# Patient Record
Sex: FEMALE | Race: BLACK OR AFRICAN AMERICAN | Employment: OTHER | URBAN - METROPOLITAN AREA
[De-identification: names, ages, dates, MRNs, and addresses within clinical notes are randomized per-mention and may not be internally consistent; named-entity substitution may affect disease eponyms.]

---

## 2018-10-16 ENCOUNTER — OP HISTORICAL/CONVERTED ENCOUNTER (OUTPATIENT)
Dept: OTHER | Age: 68
End: 2018-10-16

## 2019-08-08 ENCOUNTER — ED HISTORICAL/CONVERTED ENCOUNTER (OUTPATIENT)
Dept: OTHER | Age: 69
End: 2019-08-08

## 2019-11-14 ENCOUNTER — IP HISTORICAL/CONVERTED ENCOUNTER (OUTPATIENT)
Dept: OTHER | Age: 69
End: 2019-11-14

## 2020-01-17 ENCOUNTER — OP HISTORICAL/CONVERTED ENCOUNTER (OUTPATIENT)
Dept: OTHER | Age: 70
End: 2020-01-17

## 2020-04-16 ENCOUNTER — IP HISTORICAL/CONVERTED ENCOUNTER (OUTPATIENT)
Dept: OTHER | Age: 70
End: 2020-04-16

## 2020-05-05 ENCOUNTER — ED HISTORICAL/CONVERTED ENCOUNTER (OUTPATIENT)
Dept: OTHER | Age: 70
End: 2020-05-05

## 2021-02-26 ENCOUNTER — APPOINTMENT (OUTPATIENT)
Dept: CT IMAGING | Age: 71
DRG: 208 | End: 2021-02-26
Attending: EMERGENCY MEDICINE
Payer: MEDICARE

## 2021-02-26 ENCOUNTER — APPOINTMENT (OUTPATIENT)
Dept: GENERAL RADIOLOGY | Age: 71
DRG: 208 | End: 2021-02-26
Attending: EMERGENCY MEDICINE
Payer: MEDICARE

## 2021-02-26 ENCOUNTER — HOSPITAL ENCOUNTER (INPATIENT)
Age: 71
LOS: 10 days | Discharge: HOME HEALTH | DRG: 208 | End: 2021-03-08
Attending: EMERGENCY MEDICINE | Admitting: FAMILY MEDICINE
Payer: MEDICARE

## 2021-02-26 DIAGNOSIS — R09.02 HYPOXIA: Primary | ICD-10-CM

## 2021-02-26 DIAGNOSIS — Z99.2 ESRD ON HEMODIALYSIS (HCC): ICD-10-CM

## 2021-02-26 DIAGNOSIS — N18.6 ESRD ON HEMODIALYSIS (HCC): ICD-10-CM

## 2021-02-26 DIAGNOSIS — M31.0 GOODPASTURE'S DISEASE (HCC): ICD-10-CM

## 2021-02-26 DIAGNOSIS — I44.2 COMPLETE HEART BLOCK (HCC): ICD-10-CM

## 2021-02-26 PROBLEM — R06.02 SOB (SHORTNESS OF BREATH): Status: ACTIVE | Noted: 2021-02-26

## 2021-02-26 PROBLEM — J18.9 PNA (PNEUMONIA): Status: ACTIVE | Noted: 2021-02-26

## 2021-02-26 LAB
ALBUMIN SERPL-MCNC: 3.2 G/DL (ref 3.5–5)
ALBUMIN/GLOB SERPL: 1.1 {RATIO} (ref 1.1–2.2)
ALP SERPL-CCNC: 99 U/L (ref 45–117)
ALT SERPL-CCNC: 15 U/L (ref 12–78)
ANION GAP SERPL CALC-SCNC: 9 MMOL/L (ref 5–15)
AST SERPL W P-5'-P-CCNC: 21 U/L (ref 15–37)
ATRIAL RATE: 77 BPM
BASOPHILS # BLD: 0 K/UL (ref 0–0.1)
BASOPHILS NFR BLD: 0 % (ref 0–1)
BILIRUB SERPL-MCNC: 0.4 MG/DL (ref 0.2–1)
BNP SERPL-MCNC: ABNORMAL PG/ML
BUN SERPL-MCNC: 93 MG/DL (ref 6–20)
BUN/CREAT SERPL: 10 (ref 12–20)
CA-I BLD-MCNC: 7.7 MG/DL (ref 8.5–10.1)
CALCULATED P AXIS, ECG09: 46 DEGREES
CALCULATED R AXIS, ECG10: 32 DEGREES
CALCULATED T AXIS, ECG11: 68 DEGREES
CHLORIDE SERPL-SCNC: 96 MMOL/L (ref 97–108)
CO2 SERPL-SCNC: 28 MMOL/L (ref 21–32)
COVID-19 RAPID TEST, COVR: NOT DETECTED
CREAT SERPL-MCNC: 8.98 MG/DL (ref 0.55–1.02)
DIAGNOSIS, 93000: NORMAL
DIFFERENTIAL METHOD BLD: ABNORMAL
EOSINOPHIL # BLD: 0 K/UL (ref 0–0.4)
EOSINOPHIL NFR BLD: 0 % (ref 0–7)
ERYTHROCYTE [DISTWIDTH] IN BLOOD BY AUTOMATED COUNT: 16.6 % (ref 11.5–14.5)
GLOBULIN SER CALC-MCNC: 2.8 G/DL (ref 2–4)
GLUCOSE SERPL-MCNC: 117 MG/DL (ref 65–100)
HCT VFR BLD AUTO: 28.3 % (ref 35–47)
HGB BLD-MCNC: 9 G/DL (ref 11.5–16)
IMM GRANULOCYTES # BLD AUTO: 0 K/UL (ref 0–0.04)
IMM GRANULOCYTES NFR BLD AUTO: 0 % (ref 0–0.5)
LACTATE SERPL-SCNC: 1.5 MMOL/L (ref 0.4–2)
LYMPHOCYTES # BLD: 0.2 K/UL (ref 0.8–3.5)
LYMPHOCYTES NFR BLD: 9 % (ref 12–49)
MCH RBC QN AUTO: 28.6 PG (ref 26–34)
MCHC RBC AUTO-ENTMCNC: 31.8 G/DL (ref 30–36.5)
MCV RBC AUTO: 89.8 FL (ref 80–99)
MONOCYTES # BLD: 0.1 K/UL (ref 0–1)
MONOCYTES NFR BLD: 2 % (ref 5–13)
NEUTS SEG # BLD: 2.2 K/UL (ref 1.8–8)
NEUTS SEG NFR BLD: 89 % (ref 32–75)
P-R INTERVAL, ECG05: 180 MS
PLATELET # BLD AUTO: 145 K/UL (ref 150–400)
POTASSIUM SERPL-SCNC: 6.4 MMOL/L (ref 3.5–5.1)
PROT SERPL-MCNC: 6 G/DL (ref 6.4–8.2)
Q-T INTERVAL, ECG07: 444 MS
QRS DURATION, ECG06: 76 MS
QTC CALCULATION (BEZET), ECG08: 502 MS
RBC # BLD AUTO: 3.15 M/UL (ref 3.8–5.2)
SARS-COV-2, COV2: NORMAL
SODIUM SERPL-SCNC: 133 MMOL/L (ref 136–145)
SPECIMEN SOURCE: NORMAL
TROPONIN I SERPL-MCNC: <0.05 NG/ML
VENTRICULAR RATE, ECG03: 77 BPM
WBC # BLD AUTO: 2.5 K/UL (ref 3.6–11)

## 2021-02-26 PROCEDURE — 87635 SARS-COV-2 COVID-19 AMP PRB: CPT

## 2021-02-26 PROCEDURE — 93005 ELECTROCARDIOGRAM TRACING: CPT

## 2021-02-26 PROCEDURE — 5A09357 ASSISTANCE WITH RESPIRATORY VENTILATION, LESS THAN 24 CONSECUTIVE HOURS, CONTINUOUS POSITIVE AIRWAY PRESSURE: ICD-10-PCS | Performed by: FAMILY MEDICINE

## 2021-02-26 PROCEDURE — 74011000636 HC RX REV CODE- 636: Performed by: EMERGENCY MEDICINE

## 2021-02-26 PROCEDURE — 74011250636 HC RX REV CODE- 250/636

## 2021-02-26 PROCEDURE — 36415 COLL VENOUS BLD VENIPUNCTURE: CPT

## 2021-02-26 PROCEDURE — 71275 CT ANGIOGRAPHY CHEST: CPT

## 2021-02-26 PROCEDURE — 80053 COMPREHEN METABOLIC PANEL: CPT

## 2021-02-26 PROCEDURE — G0257 UNSCHED DIALYSIS ESRD PT HOS: HCPCS

## 2021-02-26 PROCEDURE — 84484 ASSAY OF TROPONIN QUANT: CPT

## 2021-02-26 PROCEDURE — 74011000250 HC RX REV CODE- 250

## 2021-02-26 PROCEDURE — 74011250636 HC RX REV CODE- 250/636: Performed by: EMERGENCY MEDICINE

## 2021-02-26 PROCEDURE — 99285 EMERGENCY DEPT VISIT HI MDM: CPT

## 2021-02-26 PROCEDURE — 83605 ASSAY OF LACTIC ACID: CPT

## 2021-02-26 PROCEDURE — 74011000258 HC RX REV CODE- 258: Performed by: FAMILY MEDICINE

## 2021-02-26 PROCEDURE — 94660 CPAP INITIATION&MGMT: CPT

## 2021-02-26 PROCEDURE — 74011250636 HC RX REV CODE- 250/636: Performed by: INTERNAL MEDICINE

## 2021-02-26 PROCEDURE — 74011000250 HC RX REV CODE- 250: Performed by: EMERGENCY MEDICINE

## 2021-02-26 PROCEDURE — 71045 X-RAY EXAM CHEST 1 VIEW: CPT

## 2021-02-26 PROCEDURE — 74011250636 HC RX REV CODE- 250/636: Performed by: FAMILY MEDICINE

## 2021-02-26 PROCEDURE — 85025 COMPLETE CBC W/AUTO DIFF WBC: CPT

## 2021-02-26 PROCEDURE — 87040 BLOOD CULTURE FOR BACTERIA: CPT

## 2021-02-26 PROCEDURE — 83880 ASSAY OF NATRIURETIC PEPTIDE: CPT

## 2021-02-26 PROCEDURE — 65270000029 HC RM PRIVATE

## 2021-02-26 RX ORDER — VITAMIN B COMPLEX
1 CAPSULE ORAL DAILY
Status: DISCONTINUED | OUTPATIENT
Start: 2021-02-27 | End: 2021-03-08 | Stop reason: HOSPADM

## 2021-02-26 RX ORDER — ONDANSETRON 2 MG/ML
4 INJECTION INTRAMUSCULAR; INTRAVENOUS
Status: DISCONTINUED | OUTPATIENT
Start: 2021-02-26 | End: 2021-03-08 | Stop reason: HOSPADM

## 2021-02-26 RX ORDER — HEPARIN SODIUM 1000 [USP'U]/ML
INJECTION, SOLUTION INTRAVENOUS; SUBCUTANEOUS
Status: COMPLETED
Start: 2021-02-26 | End: 2021-02-26

## 2021-02-26 RX ORDER — ASPIRIN 81 MG
1 TABLET, DELAYED RELEASE (ENTERIC COATED) ORAL
COMMUNITY
Start: 2020-10-27 | End: 2021-03-08

## 2021-02-26 RX ORDER — CARVEDILOL 3.12 MG/1
1 TABLET ORAL 2 TIMES DAILY
COMMUNITY
Start: 2020-04-06

## 2021-02-26 RX ORDER — LABETALOL 200 MG/1
200 TABLET, FILM COATED ORAL 2 TIMES DAILY
Status: DISCONTINUED | OUTPATIENT
Start: 2021-02-27 | End: 2021-02-27

## 2021-02-26 RX ORDER — PANTOPRAZOLE SODIUM 40 MG/1
40 TABLET, DELAYED RELEASE ORAL DAILY
Status: DISCONTINUED | OUTPATIENT
Start: 2021-02-27 | End: 2021-03-08 | Stop reason: HOSPADM

## 2021-02-26 RX ORDER — POLYETHYLENE GLYCOL 3350 17 G/17G
17 POWDER, FOR SOLUTION ORAL DAILY PRN
Status: DISCONTINUED | OUTPATIENT
Start: 2021-02-26 | End: 2021-03-08 | Stop reason: HOSPADM

## 2021-02-26 RX ORDER — ONDANSETRON 4 MG/1
4 TABLET, ORALLY DISINTEGRATING ORAL
Status: DISCONTINUED | OUTPATIENT
Start: 2021-02-26 | End: 2021-03-08 | Stop reason: HOSPADM

## 2021-02-26 RX ORDER — ACETAMINOPHEN 325 MG/1
650 TABLET ORAL
Status: DISCONTINUED | OUTPATIENT
Start: 2021-02-26 | End: 2021-03-08 | Stop reason: HOSPADM

## 2021-02-26 RX ORDER — AZATHIOPRINE 50 MG/1
100 TABLET ORAL DAILY
Status: DISCONTINUED | OUTPATIENT
Start: 2021-02-27 | End: 2021-02-27

## 2021-02-26 RX ORDER — FOLIC ACID/VIT B COMPLEX AND C 0.8 MG
1 TABLET ORAL DAILY
COMMUNITY
Start: 2020-10-27

## 2021-02-26 RX ORDER — SEVELAMER CARBONATE 800 MG/1
1 TABLET, FILM COATED ORAL
COMMUNITY
Start: 2020-10-27

## 2021-02-26 RX ORDER — LABETALOL 100 MG/1
2 TABLET, FILM COATED ORAL 2 TIMES DAILY
COMMUNITY
Start: 2020-06-24 | End: 2021-03-08

## 2021-02-26 RX ORDER — HEPARIN SODIUM 1000 [USP'U]/ML
INJECTION, SOLUTION INTRAVENOUS; SUBCUTANEOUS
Status: DISPENSED
Start: 2021-02-26 | End: 2021-02-27

## 2021-02-26 RX ORDER — SEVELAMER CARBONATE 800 MG/1
800 TABLET, FILM COATED ORAL
Status: DISCONTINUED | OUTPATIENT
Start: 2021-02-27 | End: 2021-03-06

## 2021-02-26 RX ORDER — AZATHIOPRINE 50 MG/1
2 TABLET ORAL DAILY
COMMUNITY
Start: 2020-05-13

## 2021-02-26 RX ORDER — ACETAMINOPHEN 650 MG/1
650 SUPPOSITORY RECTAL
Status: DISCONTINUED | OUTPATIENT
Start: 2021-02-26 | End: 2021-03-08 | Stop reason: HOSPADM

## 2021-02-26 RX ORDER — PANTOPRAZOLE SODIUM 40 MG/1
1 TABLET, DELAYED RELEASE ORAL DAILY
COMMUNITY

## 2021-02-26 RX ORDER — HYDRALAZINE HYDROCHLORIDE 10 MG/1
1 TABLET, FILM COATED ORAL
COMMUNITY
Start: 2020-04-06 | End: 2021-03-08

## 2021-02-26 RX ORDER — HEPARIN SODIUM 1000 [USP'U]/ML
3600 INJECTION, SOLUTION INTRAVENOUS; SUBCUTANEOUS ONCE
Status: COMPLETED | OUTPATIENT
Start: 2021-02-26 | End: 2021-02-26

## 2021-02-26 RX ORDER — HYDRALAZINE HYDROCHLORIDE 10 MG/1
10 TABLET, FILM COATED ORAL
Status: DISCONTINUED | OUTPATIENT
Start: 2021-02-27 | End: 2021-02-27

## 2021-02-26 RX ORDER — CARVEDILOL 3.12 MG/1
3.12 TABLET ORAL 2 TIMES DAILY
Status: DISCONTINUED | OUTPATIENT
Start: 2021-02-27 | End: 2021-02-27

## 2021-02-26 RX ADMIN — HEPARIN SODIUM 3600 UNITS: 1000 INJECTION, SOLUTION INTRAVENOUS; SUBCUTANEOUS at 18:00

## 2021-02-26 RX ADMIN — IOPAMIDOL 100 ML: 755 INJECTION, SOLUTION INTRAVENOUS at 13:51

## 2021-02-26 RX ADMIN — AZITHROMYCIN DIHYDRATE 500 MG: 500 INJECTION, POWDER, LYOPHILIZED, FOR SOLUTION INTRAVENOUS at 21:53

## 2021-02-26 RX ADMIN — CEFTRIAXONE 1 G: 1 INJECTION, POWDER, FOR SOLUTION INTRAMUSCULAR; INTRAVENOUS at 21:53

## 2021-02-26 RX ADMIN — METHYLPREDNISOLONE SODIUM SUCCINATE 40 MG: 40 INJECTION, POWDER, FOR SOLUTION INTRAMUSCULAR; INTRAVENOUS at 21:48

## 2021-02-26 RX ADMIN — PIPERACILLIN AND TAZOBACTAM 3.38 G: 3; .375 INJECTION, POWDER, LYOPHILIZED, FOR SOLUTION INTRAVENOUS at 23:53

## 2021-02-26 NOTE — CONSULTS
Consult Date: 2/26/2021    IP CONSULT TO NEPHROLOGY  Consult performed by: Henny Rubi MD  Consult ordered by: Alessia Delgado MD          Subjective Ye Early of presenting illness  Ms. Sona Mills a 56-year-old  female with a history of ESRD on hemodialysis, Goodpasture's syndrome, anemia of chronic disease, hypertension, steroid dependency, who comes to the hospital for increasing generalized weakness, inability to do dialysis at home because of weakness and worsening dyspnea. She has been on home hemodialysis  She denies fever at this time. She is on nonrebreather and will soon be placed on BiPAP. Past Medical History:   Diagnosis Date    Chronic kidney disease     Heart failure ([address]       History reviewed. No pertinent surgical history. History reviewed. No pertinent family history. Social History     Tobacco Use    Smoking status: Never Smoker    Smokeless tobacco: Never Used   Substance Use Topics    Alcohol use: Not Currently       Current Facility-Administered Medications   Medication Dose Route Frequency Provider Last Rate Last Admin    heparin (porcine) 1,000 unit/mL injection                 Review of Systems   Unable to perform ROS: Acuity of condition       Objective     Vital signs for last 24 hours:  Visit Vitals  BP (!) 154/113   Pulse 74   Temp 98 °F (36.7 °C)   Resp 26   Ht 5' 3\" (1.6 m)   Wt 68 kg (150 lb)   SpO2 (!) 72%   BMI 26.57 kg/m²       Intake/Output this shift:  Current Shift: No intake/output data recorded. Last 3 Shifts: No intake/output data recorded. Physical Exam   Constitutional: She is oriented to person, place, and time. She appears well-developed and well-nourished. HENT:   Head: Normocephalic and atraumatic. Neck: Normal range of motion. Neck supple. JVD present. Pulmonary/Chest: Effort normal.   Abdominal: Soft. Bowel sounds are normal.   Neurological: She is alert and oriented to person, place, and time. Skin: Skin is warm and dry. Psychiatric: She has a normal mood and affect. Her behavior is normal.   Nonlabored respiration  Not using accessory muscles  On nonrebreather at this time    Data Review:   Recent Results (from the past 24 hour(s))   EKG, 12 LEAD, INITIAL    Collection Time: 02/26/21 12:35 PM   Result Value Ref Range    Ventricular Rate 77 BPM    Atrial Rate 77 BPM    P-R Interval 180 ms    QRS Duration 76 ms    Q-T Interval 444 ms    QTC Calculation (Bezet) 502 ms    Calculated P Axis 46 degrees    Calculated R Axis 32 degrees    Calculated T Axis 68 degrees    Diagnosis       Normal sinus rhythm  Prolonged QT  Abnormal ECG  When compared with ECG of 05-MAY-2020 10:59,  No significant change was found  Confirmed by Sandra Crump MD, Corbin Holder ([id number]) on 2/26/2021 1:10:26 PM     CBC WITH AUTOMATED DIFF    Collection Time: 02/26/21 12:45 PM   Result Value Ref Range    WBC 2.5 (L) 3.6 - 11.0 K/uL    RBC 3.15 (L) 3.80 - 5.20 M/uL    HGB 9.0 (L) 11.5 - 16.0 g/dL    HCT 28.3 (L) 35.0 - 47.0 %    MCV 89.8 80.0 - 99.0 FL    MCH 28.6 26.0 - 34.0 PG    MCHC 31.8 30.0 - 36.5 g/dL    RDW 16.6 (H) 11.5 - 14.5 %    PLATELET 997 (L) 752 - 400 K/uL    NEUTROPHILS 89 (H) 32 - 75 %    LYMPHOCYTES 9 (L) 12 - 49 %    MONOCYTES 2 (L) 5 - 13 %    EOSINOPHILS 0 0 - 7 %    BASOPHILS 0 0 - 1 %    IMMATURE GRANULOCYTES 0 0.0 - 0.5 %    ABS. NEUTROPHILS 2.2 1.8 - 8.0 K/UL    ABS. LYMPHOCYTES 0.2 (L) 0.8 - 3.5 K/UL    ABS. MONOCYTES 0.1 0.0 - 1.0 K/UL    ABS. EOSINOPHILS 0.0 0.0 - 0.4 K/UL    ABS. BASOPHILS 0.0 0.0 - 0.1 K/UL    ABS. IMM.  GRANS. 0.0 0.00 - 0.04 K/UL    DF AUTOMATED     SARS-COV-2    Collection Time: 02/26/21  1:36 PM   Result Value Ref Range    SARS-CoV-2 Please find results under separate order     COVID-19 RAPID TEST    Collection Time: 02/26/21  1:36 PM   Result Value Ref Range    Specimen source Nasopharyngeal      COVID-19 rapid test Not Detected Not Detected           CTA CHEST W OR W WO CONT   Final Result   The findings may be a combination of extensive pneumonia,   pneumonitis and edema. No evidence of PE      XR CHEST SNGL V   Final Result           There is no problem list on file for this patient. DIAGNOSES:  1. ESRD on hemodialysis dialysis  2. Pneumonia versus ARDS versus fluid overload  3. History of Goodpasture's syndrome  4. Secondary hyperparathyroidism  5. Anemia of chronic kidney disease  6. Leukopenia    PLAN:  · Covid screening requested; ED send rapid testing is negative  · CTA being done to rule out pulmonary hemorrhage; report reviewed but does not rule it out  · In the past she was treated for Goodpasture's vasculitis  · Will arrange urgent hemodialysis  · Requested dialysis RN to ensure tunneled dialysis catheter is patentshe followed up and reported that catheter is fully functional.  · We will dialyze for 2.5 hours and plan for 2.5 L fluid removal.  · If blood pressure falls or she cannot tolerate treatment will hold back. · Tentative plan for another dialysis treatment tomorrow. · Dr. Cecelia Cronin is covering for me tomorrow.   Thank you for consulting me

## 2021-02-26 NOTE — CONSULTS
PULMONARY CONSULT  [facility]    Name: Millicent Pires MRN: [id number]   : 1950 Hospital: [facility]   Date: 2021  Admission date: 2021 Hospital Day: 1       HPI:     Hospital Problems  Never Reviewed          Codes Class Noted POA    PNA (pneumonia) ICD-10-CM: J18.9  ICD-9-CM: 486  2021 Unknown                   [x] High complexity decision making was performed  [x] See my orders for details      Subjective/Initial History:     I was asked by Sindy Balbuena MD to see Millicent Pires  a 72 y.o.  female in consultation     Excerpts from admission 2021 or consult notes as follows:   71-year-old lady came in because of shortness of breath and dyspnea significant past medical history of end-stage renal disease on hemodialysis, Goodpasture syndrome anemia of chronic disease hypertension steroid-dependent, she was not able to dialysis at home because of generalized weakness and shortness of breath she has been on home dialysis no fever no chills she was put on 100% nonrebreather mask which has been switched to noninvasive ventilator not she is going for dialysis because of severe hypoxia so pulmonary critical care consult was called for further evaluation. She is a lifetime non-smoker. No Known Allergies     MAR reviewed and pertinent medications noted or modified as needed     Current Facility-Administered Medications   Medication    heparin (porcine) 1,000 unit/mL injection    cefTRIAXone (ROCEPHIN) 1 g in sterile water (preservative free) 10 mL IV syringe    azithromycin (ZITHROMAX) 500 mg in 0.9% sodium chloride 250 mL (VIAL-MATE)     No current outpatient medications on file. Patient PCP: None  PMH:  has a past medical history of Chronic kidney disease and Heart failure ([facility] [address] PSH:   has no past surgical history on file. FHX: family history is not on file. SHX:  reports that she has never smoked.  She has never used smokeless tobacco. She reports previous alcohol use. She reports previous drug use. ROS:  Unable to obtain as patient was lethargic and severely short of breath      Objective:     Vital Signs: Telemetry:    normal sinus rhythm Intake/Output:   Visit Vitals  BP (!) (P) 165/113   Pulse (!) (P) 133   Temp 98 °F (36.7 °C)   Resp (P) 28   Ht 5' 3\" (1.6 m)   Wt 68 kg (150 lb)   SpO2 100%   BMI 26.57 kg/m²       Temp (24hrs), Av °F (36.7 °C), Min:98 °F (36.7 °C), Max:98 °F (36.7 °C)        O2 Device: BIPAP O2 Flow Rate (L/min): 6 l/min       Wt Readings from Last 4 Encounters:   21 68 kg (150 lb)        No intake or output data in the 24 hours ending 21 1526    Last shift:      No intake/output data recorded. Last 3 shifts: No intake/output data recorded. Physical Exam:     Physical Exam   Constitutional: She appears distressed. HENT:   Head: Normocephalic and atraumatic. Eyes: Pupils are equal, round, and reactive to light. EOM are normal.   Neck: Normal range of motion. Neck supple. Cardiovascular: Normal rate and regular rhythm. Pulmonary/Chest: She is in respiratory distress. She has rales. Abdominal: Soft. Musculoskeletal: Normal range of motion. Neurological: She is alert. Skin: Skin is warm. Labs:    Recent Labs     21  1245   WBC 2.5*   HGB 9.0*   *     No results for input(s): NA, K, CL, CO2, GLU, BUN, CREA, CA, MG, PHOS, LAC, ALB, TBIL, ALT, AML, LPSE in the last 72 hours. No lab exists for component: SGOT  No results for input(s): PH, PCO2, PO2, HCO3, FIO2 in the last 72 hours. No results for input(s): CPK, CKNDX, TROIQ in the last 72 hours.     No lab exists for component: CPKMB  No results found for: BNPP, BNP   No results found for: CULTNo results found for: TSH, TSHEXT    Imaging:    CXR Results  (Last 48 hours)               21 1305  XR CHEST SNGL V Final result    Narrative:  1 new comparison        Central line remains Moderate severity patchy mixed infiltrate right infrahilar and left midlung. No   effusion or pneumothorax. Normal heart and mediastinum           Results from Hospital Encounter encounter on 02/26/21   XR CHEST SNGL V    Narrative 1 new comparison April 16    Central line remains    Moderate severity patchy mixed infiltrate right infrahilar and left midlung. No  effusion or pneumothorax. Normal heart and mediastinum     Results from Hospital Encounter encounter on 02/26/21   CTA CHEST W OR W WO CONT    Narrative CT dose reduction was achieved through use of a standardized protocol tailored  for this examination and automatic exposure control for dose modulation. Contrast study maximum intensity projections    There is a extensive diffuse lung disease. Dense consolidation is seen  throughout much of the right lower lobe and there is mild variable groundglass  opacification and small foci of dense consolidation scattered elsewhere  throughout much of each lung, right greater than left, with subpleural sparing,  mostly on the LEFT. Right middle lobe is disproportionately less involved, as is  the anterior left upper lobe. Central airways are open    Pulmonary arteries are densely opacified and show no filling defect or  distortion. Aorta shows normal dimensions, without dissection. No mediastinal or  hilar adenopathy. Small moderate symmetric pleural effusions. No pericardial  effusion. No significant abdominal finding. Body wall edema      Impression The findings may be a combination of extensive pneumonia,  pneumonitis and edema. No evidence of PE         IMPRESSION:   1. Acute hypoxic respiratory failure  2. History of Goodpasture syndrome  3. Fluid overload pulmonary edema  4. End-stage renal disease on hemodialysis  5. Neutropenia  6. Anemia  7. Body mass index is 26.57 kg/m². 8. Pt is requiring Drug therapy requiring intensive monitoring for toxicity  9.  Pt is unstable, unpredictable needing inpatient monitoring; is acutely ill and at high risk of sudden decline and decompensation with severe consequenses and continued end organ dysfunction and failure  10. Prognosis guarded       RECOMMENDATIONS/PLAN:     1. BIPAP for non invasive ventilatory life support to prevent worsening respiratory acidosis and oxygen as salvage oxygen delivery device to provide high concentration of oxygen to overcome refractory hypoxia; will get arterial blood gases and wean oxygen per protocol  2. CAT scan of the chest to rule out any hemorrhage with prior history of Goodpasture syndrome. 3. Agree with immediate dialysis  4. Intubate and place on vent if NIV fails  5. Agree with Empiric IV antibiotics pending culture results we will start her on Zosyn  6. Follow culture results  7. Supplemental O2 to keep sats > 93%  8. Aspiration precautions  9. Labs to follow electrolytes, renal function and and blood counts  10. Glucose monitoring and SSI  11. Bronchial hygiene with respiratory therapy techniques, bronchodilators  12. DVT, SUP prophylaxis         This care involved high complexity medical decision making: I personally:  · Reviewed the flowsheet and previous days notes  · Reviewed and summarized records or history from previous days note or discussions with staff, family  · High Risk Drug therapy requiring intensive monitoring for toxicity: eg steroids, pressors, antibiotics  · Reviewed and/or ordered Clinical lab tests  · Reviewed images and/or ordered Radiology tests  · Reviewed the patients ECG / Telemetry  · Reviewed and/or adjusted NiPPV settings  · Called and arranged for Radiologic procedures or interventions  · performed or ordered Diagnostic endoscopies with identified risk factors.   · discussed my assessment/management with : Nursing, Hospitalist and Family for coordination of care          Jones Mcneal MD

## 2021-02-26 NOTE — ED TRIAGE NOTES
SOB increasingly worse over past 2 days, unable to lay flat, PD at home, machine malfunction this week, unable to complete dialysis appropriately over past week. 80 SPO2 RA with vomiting. Rec'd 4mg Zofran with EMS.

## 2021-02-26 NOTE — ED PROVIDER NOTES
EMERGENCY DEPARTMENT HISTORY AND PHYSICAL EXAM      Date: 2/26/2021  Patient Name: Millicent Pires    History of Presenting Illness     Chief Complaint   Patient presents with    Shortness of Breath       History Provided By: Patient    HPI: Millicent Pires, 72 y.o. female with a past medical history significant No significant past medical history presents to the ED with chief complaint of Shortness of Breath  . 59-year-old female with significant shortness of breath history of end-stage renal disease on home hemodialysis also history of Goodpasture's disease. Patient's been having significant shortness of breath. Worsening shortness of breath concerned that her home dialysis machine is not working well. Presents hypoxic. There are no other complaints, changes, or physical findings at this time. PCP: None        Past History     Past Medical History:  Past Medical History:   Diagnosis Date    Chronic kidney disease     Heart failure ([address]        Past Surgical History:  History reviewed. No pertinent surgical history. Family History:  History reviewed. No pertinent family history. Social History:  Social History     Tobacco Use    Smoking status: Never Smoker    Smokeless tobacco: Never Used   Substance Use Topics    Alcohol use: Not Currently    Drug use: Not Currently       Allergies:  No Known Allergies      Review of Systems   Review of Systems   Constitutional: Negative. Negative for chills, fatigue and fever. HENT: Negative. Negative for congestion, nosebleeds and sore throat. Eyes: Negative. Negative for pain, discharge and visual disturbance. Respiratory: Positive for cough and shortness of breath. Negative for chest tightness. Cardiovascular: Negative for chest pain, palpitations and leg swelling. Gastrointestinal: Negative for abdominal pain, blood in stool, constipation, diarrhea, nausea and vomiting. Endocrine: Negative. Genitourinary: Negative.   Negative for difficulty urinating, dysuria, pelvic pain and vaginal bleeding. Musculoskeletal: Negative. Negative for arthralgias, back pain and myalgias. Skin: Negative. Negative for rash and wound. Allergic/Immunologic: Negative. Neurological: Negative. Negative for dizziness, syncope, weakness, numbness and headaches. Hematological: Negative. Psychiatric/Behavioral: Negative. Negative for agitation, confusion and suicidal ideas. All other systems reviewed and are negative. Physical Exam   Physical Exam  Vitals signs and nursing note reviewed. Exam conducted with a chaperone present. Constitutional:       General: She is in acute distress. Appearance: Normal appearance. She is normal weight. She is ill-appearing. HENT:      Head: Normocephalic and atraumatic. Nose: Nose normal.      Mouth/Throat:      Mouth: Mucous membranes are moist.      Pharynx: Oropharynx is clear. Eyes:      Extraocular Movements: Extraocular movements intact. Conjunctiva/sclera: Conjunctivae normal.      Pupils: Pupils are equal, round, and reactive to light. Neck:      Musculoskeletal: Normal range of motion and neck supple. Cardiovascular:      Rate and Rhythm: Normal rate and regular rhythm. Pulses: Normal pulses. Heart sounds: Normal heart sounds. Pulmonary:      Effort: Pulmonary effort is normal. Tachypnea present. No respiratory distress. Breath sounds: Normal breath sounds. Abdominal:      General: Abdomen is flat. Bowel sounds are normal. There is no distension. Palpations: Abdomen is soft. Tenderness: There is no abdominal tenderness. There is no guarding. Musculoskeletal: Normal range of motion. General: No swelling, tenderness, deformity or signs of injury. Right lower leg: No edema. Left lower leg: No edema. Skin:     General: Skin is warm and dry. Capillary Refill: Capillary refill takes less than 2 seconds.       Findings: No lesion or rash. Neurological:      General: No focal deficit present. Mental Status: She is alert and oriented to person, place, and time. Mental status is at baseline. Cranial Nerves: No cranial nerve deficit. Psychiatric:         Mood and Affect: Mood normal.         Behavior: Behavior normal.         Thought Content: Thought content normal.         Judgment: Judgment normal.         Diagnostic Study Results     Labs -     Recent Results (from the past 12 hour(s))   EKG, 12 LEAD, INITIAL    Collection Time: 02/26/21 12:35 PM   Result Value Ref Range    Ventricular Rate 77 BPM    Atrial Rate 77 BPM    P-R Interval 180 ms    QRS Duration 76 ms    Q-T Interval 444 ms    QTC Calculation (Bezet) 502 ms    Calculated P Axis 46 degrees    Calculated R Axis 32 degrees    Calculated T Axis 68 degrees    Diagnosis       Normal sinus rhythm  Prolonged QT  Abnormal ECG  When compared with ECG of 05-MAY-2020 10:59,  No significant change was found  Confirmed by Leatha Maldonado MD, Jewels Rodrigues ([id number]) on 2/26/2021 1:10:26 PM     CBC WITH AUTOMATED DIFF    Collection Time: 02/26/21 12:45 PM   Result Value Ref Range    WBC 2.5 (L) 3.6 - 11.0 K/uL    RBC 3.15 (L) 3.80 - 5.20 M/uL    HGB 9.0 (L) 11.5 - 16.0 g/dL    HCT 28.3 (L) 35.0 - 47.0 %    MCV 89.8 80.0 - 99.0 FL    MCH 28.6 26.0 - 34.0 PG    MCHC 31.8 30.0 - 36.5 g/dL    RDW 16.6 (H) 11.5 - 14.5 %    PLATELET 307 (L) 597 - 400 K/uL    NEUTROPHILS 89 (H) 32 - 75 %    LYMPHOCYTES 9 (L) 12 - 49 %    MONOCYTES 2 (L) 5 - 13 %    EOSINOPHILS 0 0 - 7 %    BASOPHILS 0 0 - 1 %    IMMATURE GRANULOCYTES 0 0.0 - 0.5 %    ABS. NEUTROPHILS 2.2 1.8 - 8.0 K/UL    ABS. LYMPHOCYTES 0.2 (L) 0.8 - 3.5 K/UL    ABS. MONOCYTES 0.1 0.0 - 1.0 K/UL    ABS. EOSINOPHILS 0.0 0.0 - 0.4 K/UL    ABS. BASOPHILS 0.0 0.0 - 0.1 K/UL    ABS. IMM.  GRANS. 0.0 0.00 - 0.04 K/UL    DF AUTOMATED     SARS-COV-2    Collection Time: 02/26/21  1:36 PM   Result Value Ref Range    SARS-CoV-2 Please find results under separate order     COVID-19 RAPID TEST    Collection Time: 02/26/21  1:36 PM   Result Value Ref Range    Specimen source Nasopharyngeal      COVID-19 rapid test Not Detected Not Detected         Radiologic Studies -   CTA CHEST W OR W WO CONT   Final Result   The findings may be a combination of extensive pneumonia,   pneumonitis and edema. No evidence of PE      XR CHEST SNGL V   Final Result        CT Results  (Last 48 hours)               02/26/21 1400  CTA CHEST W OR W WO CONT Final result    Impression: The findings may be a combination of extensive pneumonia,   pneumonitis and edema. No evidence of PE       Narrative:  CT dose reduction was achieved through use of a standardized protocol tailored   for this examination and automatic exposure control for dose modulation. Contrast study maximum intensity projections       There is a extensive diffuse lung disease. Dense consolidation is seen   throughout much of the right lower lobe and there is mild variable groundglass   opacification and small foci of dense consolidation scattered elsewhere   throughout much of each lung, right greater than left, with subpleural sparing,   mostly on the LEFT. Right middle lobe is disproportionately less involved, as is   the anterior left upper lobe. Central airways are open       Pulmonary arteries are densely opacified and show no filling defect or   distortion. Aorta shows normal dimensions, without dissection. No mediastinal or   hilar adenopathy. Small moderate symmetric pleural effusions. No pericardial   effusion. No significant abdominal finding. Body wall edema               CXR Results  (Last 48 hours)               02/26/21 1305  XR CHEST SNGL V Final result    Narrative:  1 new comparison April 16       Central line remains       Moderate severity patchy mixed infiltrate right infrahilar and left midlung. No   effusion or pneumothorax.  Normal heart and mediastinum             Medical Decision Making and ED Course   I am the first provider for this patient. I reviewed the vital signs, available nursing notes, past medical history, past surgical history, family history and social history. Vital Signs-Reviewed the patient's vital signs. Patient Vitals for the past 12 hrs:   Temp Pulse Resp BP SpO2   02/26/21 1237 98 °F (36.7 °C) 74 26 (!) 154/113 (!) 72 %       EKG interpretation:   EKG at 1235. Normal sinus rhythm. Prolonged QTC rate of 77. No ST changes. No T wave inversions. Normal intervals. Reason rule out ACS. Interpreted by ER physician. Records Reviewed: Previous Hospital chart. EMS run report      ED Course:   Initial assessment performed. The patients presenting problems have been discussed, and they are in agreement with the care plan formulated and outlined with them. I have encouraged them to ask questions as they arise throughout their visit. Orders Placed This Encounter    COVID-19 RAPID TEST     Standing Status:   Standing     Number of Occurrences:   1    CULTURE, BLOOD, PAIRED     Standing Status:   Standing     Number of Occurrences:   1    CULTURE, BLOOD     Standing Status:   Standing     Number of Occurrences:   1    XR CHEST SNGL V     Standing Status:   Standing     Number of Occurrences:   1     Order Specific Question:   Transport     Answer:   Stretcher W/O2/IV [6]     Order Specific Question:   Reason for Exam     Answer:   sob    CTA CHEST W OR W WO CONT     Standing Status:   Standing     Number of Occurrences:   1     Order Specific Question:   Transport     Answer:   BED [2]     Order Specific Question:   Reason for Exam     Answer:   sob, goodpasture syndrome. on HD     Order Specific Question:   Does patient have history of Renal Disease?      Answer:   Yes     Order Specific Question:   Decision Support Exception     Answer:   Emergency Medical Condition (MA) [1]    CBC WITH AUTOMATED DIFF     Standing Status:   Standing     Number of Occurrences:   1    SARS-COV-2     Rapid covid     Standing Status:   Standing     Number of Occurrences:   1     Order Specific Question:   Specimen source     Answer:   NASOPHARYNGEAL SWAB [650]     Order Specific Question:   Is this test for diagnosis or screening? Answer:   Diagnosis of ill patient     Order Specific Question:   Symptomatic for COVID-19 as defined by CDC? Answer:   Yes     Order Specific Question:   Date of Symptom Onset     Answer:   2/26/2021     Order Specific Question:   Hospitalized for COVID-19? Answer:   Yes     Order Specific Question:   Admitted to ICU for COVID-19? Answer:   Yes     Order Specific Question:   Employed in healthcare setting? Answer:   No     Order Specific Question:   Resident in a congregate (group) care setting? Answer:   No     Order Specific Question:   Pregnant? Answer:   No     Order Specific Question:   Previously tested for COVID-19? Answer:   No    METABOLIC PANEL, COMPREHENSIVE     Standing Status:   Standing     Number of Occurrences:   1    BNP     Standing Status:   Standing     Number of Occurrences:   1    TROPONIN I     Standing Status:   Standing     Number of Occurrences:   1    LACTIC ACID, PLASMA     If lactic acid level is greater than 2, then a repeat lactic acid level is to be drawn in 6 hours. Standing Status:   Standing     Number of Occurrences:   1    LACTIC ACID, PLASMA     If initial lactic acid level is greater than 2, then a repeat lactic acid level is to be drawn in 6 hours. Standing Status:   Standing     Number of Occurrences:   80381    IP CONSULT TO NEPHROLOGY     Standing Status:   Standing     Number of Occurrences:   1     Order Specific Question:   Reason for Consult: Answer:   esrd on hd     Order Specific Question:   Did you call or speak to the consulting provider? Answer:    Yes    EKG, 12 LEAD, INITIAL     Standing Status:   Standing     Number of Occurrences:   1     Order Specific Question: Reason for Exam:     Answer:   sob    SAMPLE TO BLOOD BANK     Standing Status:   Standing     Number of Occurrences:   1    HEMODIALYSIS INPATIENT Duration (hr): 2.5; Dialyzer: F160; Dialysate Bath:  K: 3; Dialysate Bath:  CA: 2.5; Dialysate Bath: Bicarb: 35; Sodium Profiling/Modeling: No; Profile (Beginning / mEq/L): 138; Target Fluid Removal (mL): 2.5; Access: Centra. .. Standing Status:   Standing     Number of Occurrences:   1     Order Specific Question:   Duration (hr)     Answer:   2.5     Order Specific Question:   Dialyzer     Answer:   F160     Order Specific Question:   Dialysate Bath:  K     Answer:   3     Order Specific Question:   Dialysate Bath:  CA     Answer:   2.5     Order Specific Question:   Dialysate Bath: Bicarb     Answer:   35     Order Specific Question:   Sodium Profiling/Modeling     Answer:   No     Order Specific Question:   Profile (Beginning / mEq/L)     Answer:   138     Order Specific Question:   Target Fluid Removal (mL)     Answer:   2.5     Order Specific Question:   Access     Answer:   Central Line     Order Specific Question:   Blood Flow Rate (mL/min)     Answer:   400     Order Specific Question:   Dialysate Flow Rate (mL/min)     Answer:   600    iopamidoL (ISOVUE-370) 76 % injection 100 mL    heparin (porcine) 1,000 unit/mL injection     Roselia Torres: cabinet overrleandro    cefTRIAXone (ROCEPHIN) 1 g in sterile water (preservative free) 10 mL IV syringe     Order Specific Question:   Antibiotic Indications     Answer:   Pneumonia (HAP)    azithromycin (ZITHROMAX) 500 mg in 0.9% sodium chloride 250 mL (VIAL-MATE)     Order Specific Question:   Antibiotic Indications     Answer:   Pneumonia (CAP)    INITIAL PHYSICIAN ORDER: INPATIENT Medical; 3.  Patient receiving treatment that can only be provided in an inpatient setting (further clarification in H&P documentation)     Standing Status:   Standing     Number of Occurrences:   1     Order Specific Question: Status: Answer:   INPATIENT [101]     Order Specific Question:   Type of Bed     Answer:   Medical [8]     Order Specific Question:   Inpatient Hospitalization Certified Necessary for the Following Reasons     Answer:   3. Patient receiving treatment that can only be provided in an inpatient setting (further clarification in H&P documentation)     Order Specific Question:   Admitting Diagnosis     Answer:   PNA (pneumonia) [[id number]]     Order Specific Question:   Admitting Physician     Answer:   [place] Midget     Order Specific Question:   Attending Physician     Answer:   [place] Midget     Order Specific Question:   Estimated Length of Stay     Answer:   3-4 Midnights     Order Specific Question:   Discharge Plan:     Answer:   Home with Office Follow-up              CONSULTANTS:  Consults  anali request CT then HD  moh admit    Provider Notes (Medical Decision Making):   76-year-old with a history of dialysis hemodialysis with Goodpasture's disease with significant shortness of breath hypoxia. Plan to rule out pulmonary hemorrhage with CT scan. Dialysis aware and they will dialyze her afterwards versus transfusion. Also requesting Covid swab. Patient is doing much better on BiPAP. Procedures             CRITICAL CARE NOTE :  2:05 PM  Amount of Critical Care Time: 45 minutes    IMPENDING DETERIORATION -Airway, Respiratory and Cardiovascular  ASSOCIATED RISK FACTORS - Hypotension  MANAGEMENT- Bedside Assessment and Supervision of Care  INTERPRETATION -  Xrays and Blood Gases  INTERVENTIONS - hemodynamic mngmt  CASE REVIEW - Hospitalist/Intensivist  TREATMENT RESPONSE -Improved  PERFORMED BY - Self    NOTES   :  I have spent critical care time involved in lab review, consultations with specialist, family decision- making, bedside attention and documentation. This time excludes time spent in any separate billed procedures.   During this entire length of time I was immediately available to the patient . Patricia Bhandari MD              Disposition       Emergency Department Disposition:  admit      Diagnosis     Clinical Impression:   1. Hypoxia    2. ESRD on hemodialysis ([address]    3. Goodpasture's disease ([facility] [address]        Attestations:    Patricia Bhandari MD    Please note that this dictation was completed with [employer], the computer voice recognition software. Quite often unanticipated grammatical, syntax, homophones, and other interpretive errors are inadvertently transcribed by the computer software. Please disregard these errors. Please excuse any errors that have escaped final proofreading. Thank you.

## 2021-02-26 NOTE — Clinical Note
Temporary pacemaker inserted. Inserted through the right groin. Temporary Pacer pacing in the right ventricle. Rate = 70 bpm.   5 mA. Electrical capture obtained.

## 2021-02-26 NOTE — Clinical Note
Dobutamine infusing at 5 mcg/kg/min via right wrist PIV, Levophed infusing at 20 mcg/min via left AC PIV on arrival to cath lab

## 2021-02-26 NOTE — ROUTINE PROCESS
TRANSFER - OUT REPORT: 
 
Verbal report given to [facility]) on Katherine Alanis  being transferred to (unit) for routine progression of care Report consisted of patients Situation, Background, Assessment and  
Recommendations(SBAR). Information from the following report(s) SBAR, Kardex, ED Summary, MAR, Accordion and Recent Results was reviewed with the receiving nurse. Lines:  
Peripheral IV 02/26/21 Right Antecubital (Active) Site Assessment Clean, dry, & intact 02/26/21 1245 Phlebitis Assessment 0 02/26/21 1245 Infiltration Assessment 0 02/26/21 1245 Dressing Status Clean, dry, & intact 02/26/21 1245 Dressing Type Transparent 02/26/21 1245 Hub Color/Line Status Blue;Flushed 02/26/21 1245 Alcohol Cap Used Yes 02/26/21 1245 Opportunity for questions and clarification was provided. Patient transported with: 
 Monitor Registered Nurse

## 2021-02-27 ENCOUNTER — APPOINTMENT (OUTPATIENT)
Dept: GENERAL RADIOLOGY | Age: 71
DRG: 208 | End: 2021-02-27
Attending: FAMILY MEDICINE
Payer: MEDICARE

## 2021-02-27 LAB
ALBUMIN SERPL-MCNC: 3.5 G/DL (ref 3.5–5)
ALBUMIN SERPL-MCNC: 3.9 G/DL (ref 3.5–5)
ALBUMIN/GLOB SERPL: 1 {RATIO} (ref 1.1–2.2)
ALP SERPL-CCNC: 97 U/L (ref 45–117)
ALT SERPL-CCNC: 113 U/L (ref 12–78)
ANION GAP SERPL CALC-SCNC: 10 MMOL/L (ref 5–15)
ANION GAP SERPL CALC-SCNC: 11 MMOL/L (ref 5–15)
ARTERIAL PATENCY WRIST A: ABNORMAL
ARTERIAL PATENCY WRIST A: ABNORMAL
ARTERIAL PATENCY WRIST A: POSITIVE
AST SERPL W P-5'-P-CCNC: 164 U/L (ref 15–37)
ATRIAL RATE: 0 BPM
BASE DEFICIT BLDA-SCNC: 4.2 MMOL/L (ref 0–2)
BASE DEFICIT BLDA-SCNC: 5.3 MMOL/L (ref 0–2)
BASE DEFICIT BLDA-SCNC: 5.5 MMOL/L (ref 0–2)
BASOPHILS # BLD: 0 K/UL (ref 0–0.1)
BASOPHILS NFR BLD: 0 % (ref 0–1)
BDY SITE: ABNORMAL
BILIRUB SERPL-MCNC: 0.6 MG/DL (ref 0.2–1)
BUN SERPL-MCNC: 21 MG/DL (ref 6–20)
BUN SERPL-MCNC: 61 MG/DL (ref 6–20)
BUN/CREAT SERPL: 7 (ref 12–20)
BUN/CREAT SERPL: 9 (ref 12–20)
CA-I BLD-MCNC: 7.9 MG/DL (ref 8.5–10.1)
CA-I BLD-MCNC: 8.9 MG/DL (ref 8.5–10.1)
CALCULATED R AXIS, ECG10: 12 DEGREES
CALCULATED T AXIS, ECG11: -150 DEGREES
CHLORIDE SERPL-SCNC: 99 MMOL/L (ref 97–108)
CHLORIDE SERPL-SCNC: 99 MMOL/L (ref 97–108)
CO2 SERPL-SCNC: 22 MMOL/L (ref 21–32)
CO2 SERPL-SCNC: 29 MMOL/L (ref 21–32)
CREAT SERPL-MCNC: 2.97 MG/DL (ref 0.55–1.02)
CREAT SERPL-MCNC: 6.88 MG/DL (ref 0.55–1.02)
DIAGNOSIS, 93000: NORMAL
DIFFERENTIAL METHOD BLD: ABNORMAL
EOSINOPHIL # BLD: 0 K/UL (ref 0–0.4)
EOSINOPHIL NFR BLD: 0 % (ref 0–7)
EPAP/CPAP/PEEP, PAPEEP: 5
EPAP/CPAP/PEEP, PAPEEP: 6
EPAP/CPAP/PEEP, PAPEEP: 6
ERYTHROCYTE [DISTWIDTH] IN BLOOD BY AUTOMATED COUNT: 16.6 % (ref 11.5–14.5)
FIO2 ON VENT: 100 %
GAS FLOW.O2 SETTING OXYMISER: 12 L/MIN
GAS FLOW.O2 SETTING OXYMISER: 14 L/MIN
GAS FLOW.O2 SETTING OXYMISER: 14 L/MIN
GLOBULIN SER CALC-MCNC: 3.4 G/DL (ref 2–4)
GLUCOSE BLD STRIP.AUTO-MCNC: 163 MG/DL (ref 65–100)
GLUCOSE BLD STRIP.AUTO-MCNC: 214 MG/DL (ref 65–100)
GLUCOSE BLD STRIP.AUTO-MCNC: 89 MG/DL (ref 65–100)
GLUCOSE SERPL-MCNC: 105 MG/DL (ref 65–100)
GLUCOSE SERPL-MCNC: 247 MG/DL (ref 65–100)
HCO3 BLDA-SCNC: 20 MMOL/L (ref 22–26)
HCO3 BLDA-SCNC: 20 MMOL/L (ref 22–26)
HCO3 BLDA-SCNC: 21 MMOL/L (ref 22–26)
HCT VFR BLD AUTO: 42.2 % (ref 35–47)
HGB BLD-MCNC: 13.5 G/DL (ref 11.5–16)
IMM GRANULOCYTES # BLD AUTO: 0 K/UL (ref 0–0.04)
IMM GRANULOCYTES NFR BLD AUTO: 0 % (ref 0–0.5)
IPAP/PIP, IPAPIP: 0
LACTATE SERPL-SCNC: 3.3 MMOL/L (ref 0.4–2)
LYMPHOCYTES # BLD: 0.3 K/UL (ref 0.8–3.5)
LYMPHOCYTES NFR BLD: 4 % (ref 12–49)
MAGNESIUM SERPL-MCNC: 2.1 MG/DL (ref 1.6–2.4)
MCH RBC QN AUTO: 29.2 PG (ref 26–34)
MCHC RBC AUTO-ENTMCNC: 32 G/DL (ref 30–36.5)
MCV RBC AUTO: 91.1 FL (ref 80–99)
MONOCYTES # BLD: 0.4 K/UL (ref 0–1)
MONOCYTES NFR BLD: 5 % (ref 5–13)
NEUTS SEG # BLD: 7.3 K/UL (ref 1.8–8)
NEUTS SEG NFR BLD: 91 % (ref 32–75)
PCO2 BLDA: 31 MMHG (ref 35–45)
PCO2 BLDA: 40 MMHG (ref 35–45)
PCO2 BLDA: 54 MMHG (ref 35–45)
PERFORMED BY, TECHID: ABNORMAL
PERFORMED BY, TECHID: ABNORMAL
PERFORMED BY, TECHID: NORMAL
PH BLDA: 7.23 [PH] (ref 7.35–7.45)
PH BLDA: 7.32 [PH] (ref 7.35–7.45)
PH BLDA: 7.4 [PH] (ref 7.35–7.45)
PHOSPHATE SERPL-MCNC: 2.9 MG/DL (ref 2.6–4.7)
PLATELET # BLD AUTO: 156 K/UL (ref 150–400)
PMV BLD AUTO: 12.4 FL (ref 8.9–12.9)
PO2 BLDA: 63 MMHG (ref 75–100)
PO2 BLDA: 73 MMHG (ref 75–100)
PO2 BLDA: 76 MMHG (ref 75–100)
POTASSIUM SERPL-SCNC: 3.6 MMOL/L (ref 3.5–5.1)
POTASSIUM SERPL-SCNC: 6.2 MMOL/L (ref 3.5–5.1)
PROT SERPL-MCNC: 6.9 G/DL (ref 6.4–8.2)
Q-T INTERVAL, ECG07: 644 MS
QRS DURATION, ECG06: 182 MS
QTC CALCULATION (BEZET), ECG08: 792 MS
RBC # BLD AUTO: 4.63 M/UL (ref 3.8–5.2)
SAO2 % BLD: 92 %
SAO2 % BLD: 92 %
SAO2 % BLD: 93 %
SAO2% DEVICE SAO2% SENSOR NAME: ABNORMAL
SERVICE CMNT-IMP: ABNORMAL
SERVICE CMNT-IMP: ABNORMAL
SODIUM SERPL-SCNC: 132 MMOL/L (ref 136–145)
SODIUM SERPL-SCNC: 138 MMOL/L (ref 136–145)
SPECIMEN SITE: ABNORMAL
VENTILATION MODE VENT: ABNORMAL
VENTILATION MODE VENT: ABNORMAL
VENTRICULAR RATE, ECG03: 91 BPM
VT SETTING VENT: 400 ML
WBC # BLD AUTO: 8.1 K/UL (ref 3.6–11)

## 2021-02-27 PROCEDURE — 77010033678 HC OXYGEN DAILY

## 2021-02-27 PROCEDURE — 90935 HEMODIALYSIS ONE EVALUATION: CPT

## 2021-02-27 PROCEDURE — 74011000258 HC RX REV CODE- 258: Performed by: FAMILY MEDICINE

## 2021-02-27 PROCEDURE — 71045 X-RAY EXAM CHEST 1 VIEW: CPT

## 2021-02-27 PROCEDURE — 74011250636 HC RX REV CODE- 250/636: Performed by: INTERNAL MEDICINE

## 2021-02-27 PROCEDURE — 82803 BLOOD GASES ANY COMBINATION: CPT

## 2021-02-27 PROCEDURE — C1894 INTRO/SHEATH, NON-LASER: HCPCS | Performed by: INTERNAL MEDICINE

## 2021-02-27 PROCEDURE — 93005 ELECTROCARDIOGRAM TRACING: CPT

## 2021-02-27 PROCEDURE — 74011250636 HC RX REV CODE- 250/636

## 2021-02-27 PROCEDURE — 74011000250 HC RX REV CODE- 250: Performed by: LEGAL MEDICINE

## 2021-02-27 PROCEDURE — 83735 ASSAY OF MAGNESIUM: CPT

## 2021-02-27 PROCEDURE — 33210 INSERT ELECTRD/PM CATH SNGL: CPT | Performed by: INTERNAL MEDICINE

## 2021-02-27 PROCEDURE — 87040 BLOOD CULTURE FOR BACTERIA: CPT

## 2021-02-27 PROCEDURE — 74011250636 HC RX REV CODE- 250/636: Performed by: FAMILY MEDICINE

## 2021-02-27 PROCEDURE — 74011000250 HC RX REV CODE- 250: Performed by: INTERNAL MEDICINE

## 2021-02-27 PROCEDURE — 5A09357 ASSISTANCE WITH RESPIRATORY VENTILATION, LESS THAN 24 CONSECUTIVE HOURS, CONTINUOUS POSITIVE AIRWAY PRESSURE: ICD-10-PCS | Performed by: FAMILY MEDICINE

## 2021-02-27 PROCEDURE — 77030019799 HC CBL ATRL DISP MEDT -B: Performed by: INTERNAL MEDICINE

## 2021-02-27 PROCEDURE — 74011000250 HC RX REV CODE- 250

## 2021-02-27 PROCEDURE — 5A1945Z RESPIRATORY VENTILATION, 24-96 CONSECUTIVE HOURS: ICD-10-PCS | Performed by: FAMILY MEDICINE

## 2021-02-27 PROCEDURE — 80069 RENAL FUNCTION PANEL: CPT

## 2021-02-27 PROCEDURE — 65610000006 HC RM INTENSIVE CARE

## 2021-02-27 PROCEDURE — 74011000250 HC RX REV CODE- 250: Performed by: FAMILY MEDICINE

## 2021-02-27 PROCEDURE — 80053 COMPREHEN METABOLIC PANEL: CPT

## 2021-02-27 PROCEDURE — 5A1223Z PERFORMANCE OF CARDIAC PACING, CONTINUOUS: ICD-10-PCS | Performed by: INTERNAL MEDICINE

## 2021-02-27 PROCEDURE — 0BH17EZ INSERTION OF ENDOTRACHEAL AIRWAY INTO TRACHEA, VIA NATURAL OR ARTIFICIAL OPENING: ICD-10-PCS | Performed by: ANESTHESIOLOGY

## 2021-02-27 PROCEDURE — 83605 ASSAY OF LACTIC ACID: CPT

## 2021-02-27 PROCEDURE — 74011000258 HC RX REV CODE- 258: Performed by: INTERNAL MEDICINE

## 2021-02-27 PROCEDURE — 74011250636 HC RX REV CODE- 250/636: Performed by: LEGAL MEDICINE

## 2021-02-27 PROCEDURE — 94002 VENT MGMT INPAT INIT DAY: CPT

## 2021-02-27 PROCEDURE — 36415 COLL VENOUS BLD VENIPUNCTURE: CPT

## 2021-02-27 PROCEDURE — 82962 GLUCOSE BLOOD TEST: CPT

## 2021-02-27 PROCEDURE — 77030002996 HC SUT SLK J&J -A: Performed by: INTERNAL MEDICINE

## 2021-02-27 PROCEDURE — 5A1D70Z PERFORMANCE OF URINARY FILTRATION, INTERMITTENT, LESS THAN 6 HOURS PER DAY: ICD-10-PCS | Performed by: FAMILY MEDICINE

## 2021-02-27 PROCEDURE — 85025 COMPLETE CBC W/AUTO DIFF WBC: CPT

## 2021-02-27 PROCEDURE — 77030005319 HC CATH PACE FEM BL STJU -C: Performed by: INTERNAL MEDICINE

## 2021-02-27 PROCEDURE — 94660 CPAP INITIATION&MGMT: CPT

## 2021-02-27 PROCEDURE — 83880 ASSAY OF NATRIURETIC PEPTIDE: CPT

## 2021-02-27 PROCEDURE — 83036 HEMOGLOBIN GLYCOSYLATED A1C: CPT

## 2021-02-27 PROCEDURE — 74011636637 HC RX REV CODE- 636/637: Performed by: FAMILY MEDICINE

## 2021-02-27 PROCEDURE — 74011250637 HC RX REV CODE- 250/637: Performed by: FAMILY MEDICINE

## 2021-02-27 PROCEDURE — 94760 N-INVAS EAR/PLS OXIMETRY 1: CPT

## 2021-02-27 DEVICE — CABLE 5846A ROHS GLB 6FT 5PK 10L: Type: IMPLANTABLE DEVICE | Status: FUNCTIONAL

## 2021-02-27 DEVICE — FLOW DIRECTED PACING CATHETER
Type: IMPLANTABLE DEVICE | Status: FUNCTIONAL
Brand: PACEL™

## 2021-02-27 RX ORDER — HYDROXYZINE PAMOATE 50 MG/1
50 CAPSULE ORAL
Status: DISCONTINUED | OUTPATIENT
Start: 2021-02-27 | End: 2021-03-08 | Stop reason: HOSPADM

## 2021-02-27 RX ORDER — NOREPINEPHRINE BITARTRATE/D5W 8 MG/250ML
PLASTIC BAG, INJECTION (ML) INTRAVENOUS
Status: COMPLETED
Start: 2021-02-27 | End: 2021-02-27

## 2021-02-27 RX ORDER — HEPARIN SODIUM 1000 [USP'U]/ML
3600 INJECTION, SOLUTION INTRAVENOUS; SUBCUTANEOUS ONCE
Status: COMPLETED | OUTPATIENT
Start: 2021-02-27 | End: 2021-02-27

## 2021-02-27 RX ORDER — HEPARIN SODIUM 200 [USP'U]/100ML
INJECTION, SOLUTION INTRAVENOUS
Status: COMPLETED | OUTPATIENT
Start: 2021-02-27 | End: 2021-02-27

## 2021-02-27 RX ORDER — ATROPINE SULFATE 0.1 MG/ML
INJECTION INTRAVENOUS
Status: COMPLETED | OUTPATIENT
Start: 2021-02-27 | End: 2021-02-27

## 2021-02-27 RX ORDER — METOPROLOL TARTRATE 5 MG/5ML
5 INJECTION INTRAVENOUS
Status: DISCONTINUED | OUTPATIENT
Start: 2021-02-27 | End: 2021-03-08 | Stop reason: HOSPADM

## 2021-02-27 RX ORDER — ROCURONIUM BROMIDE 10 MG/ML
INJECTION, SOLUTION INTRAVENOUS
Status: COMPLETED
Start: 2021-02-27 | End: 2021-02-27

## 2021-02-27 RX ORDER — SODIUM BICARBONATE 1 MEQ/ML
50 SYRINGE (ML) INTRAVENOUS
Status: COMPLETED | OUTPATIENT
Start: 2021-02-27 | End: 2021-02-27

## 2021-02-27 RX ORDER — CALCIUM GLUCONATE 20 MG/ML
1 INJECTION, SOLUTION INTRAVENOUS ONCE
Status: COMPLETED | OUTPATIENT
Start: 2021-02-27 | End: 2021-02-27

## 2021-02-27 RX ORDER — LIDOCAINE HYDROCHLORIDE 10 MG/ML
INJECTION INFILTRATION; PERINEURAL AS NEEDED
Status: DISCONTINUED | OUTPATIENT
Start: 2021-02-27 | End: 2021-02-27 | Stop reason: HOSPADM

## 2021-02-27 RX ORDER — DEXTROSE 50 % IN WATER (D50W) INTRAVENOUS SYRINGE
25-50 AS NEEDED
Status: DISCONTINUED | OUTPATIENT
Start: 2021-02-27 | End: 2021-03-08 | Stop reason: HOSPADM

## 2021-02-27 RX ORDER — AZATHIOPRINE 50 MG/1
25 TABLET ORAL DAILY
Status: DISCONTINUED | OUTPATIENT
Start: 2021-02-28 | End: 2021-03-08 | Stop reason: HOSPADM

## 2021-02-27 RX ORDER — INSULIN LISPRO 100 [IU]/ML
INJECTION, SOLUTION INTRAVENOUS; SUBCUTANEOUS
Status: DISCONTINUED | OUTPATIENT
Start: 2021-02-27 | End: 2021-03-08 | Stop reason: HOSPADM

## 2021-02-27 RX ORDER — DOPAMINE HYDROCHLORIDE 160 MG/100ML
0-20 INJECTION, SOLUTION INTRAVENOUS
Status: DISCONTINUED | OUTPATIENT
Start: 2021-02-27 | End: 2021-02-27

## 2021-02-27 RX ORDER — ATROPINE SULFATE 0.1 MG/ML
INJECTION INTRAVENOUS
Status: COMPLETED
Start: 2021-02-27 | End: 2021-02-27

## 2021-02-27 RX ORDER — MAGNESIUM SULFATE 100 %
4 CRYSTALS MISCELLANEOUS AS NEEDED
Status: DISCONTINUED | OUTPATIENT
Start: 2021-02-27 | End: 2021-03-08 | Stop reason: HOSPADM

## 2021-02-27 RX ORDER — PROPOFOL 10 MG/ML
0-50 VIAL (ML) INTRAVENOUS
Status: DISCONTINUED | OUTPATIENT
Start: 2021-02-27 | End: 2021-03-08 | Stop reason: HOSPADM

## 2021-02-27 RX ORDER — HEPARIN SODIUM 1000 [USP'U]/ML
INJECTION, SOLUTION INTRAVENOUS; SUBCUTANEOUS
Status: DISPENSED
Start: 2021-02-27 | End: 2021-02-28

## 2021-02-27 RX ORDER — DOBUTAMINE HYDROCHLORIDE 200 MG/100ML
5 INJECTION INTRAVENOUS CONTINUOUS
Status: DISCONTINUED | OUTPATIENT
Start: 2021-02-27 | End: 2021-03-08

## 2021-02-27 RX ORDER — PROPOFOL 10 MG/ML
INJECTION, EMULSION INTRAVENOUS
Status: COMPLETED
Start: 2021-02-27 | End: 2021-02-27

## 2021-02-27 RX ADMIN — METHYLPREDNISOLONE SODIUM SUCCINATE 40 MG: 40 INJECTION, POWDER, FOR SOLUTION INTRAMUSCULAR; INTRAVENOUS at 22:00

## 2021-02-27 RX ADMIN — PROPOFOL 30 MCG/KG/MIN: 10 INJECTION, EMULSION INTRAVENOUS at 22:00

## 2021-02-27 RX ADMIN — HYDROXYZINE PAMOATE 50 MG: 50 CAPSULE ORAL at 05:28

## 2021-02-27 RX ADMIN — PROPOFOL 20 MCG/KG/MIN: 10 INJECTION, EMULSION INTRAVENOUS at 16:00

## 2021-02-27 RX ADMIN — ATROPINE SULFATE: 0.1 INJECTION PARENTERAL at 11:00

## 2021-02-27 RX ADMIN — HEPARIN SODIUM 3600 UNITS: 1000 INJECTION, SOLUTION INTRAVENOUS; SUBCUTANEOUS at 18:28

## 2021-02-27 RX ADMIN — METOPROLOL TARTRATE 5 MG: 5 INJECTION, SOLUTION INTRAVENOUS at 16:46

## 2021-02-27 RX ADMIN — PIPERACILLIN AND TAZOBACTAM 3.38 G: 3; .375 INJECTION, POWDER, LYOPHILIZED, FOR SOLUTION INTRAVENOUS at 09:57

## 2021-02-27 RX ADMIN — LABETALOL HYDROCHLORIDE 200 MG: 200 TABLET, FILM COATED ORAL at 09:57

## 2021-02-27 RX ADMIN — ATROPINE SULFATE: 0.1 INJECTION, SOLUTION ENDOTRACHEAL; INTRAMUSCULAR; INTRAVENOUS; SUBCUTANEOUS at 11:00

## 2021-02-27 RX ADMIN — METHYLPREDNISOLONE SODIUM SUCCINATE 40 MG: 40 INJECTION, POWDER, FOR SOLUTION INTRAMUSCULAR; INTRAVENOUS at 05:28

## 2021-02-27 RX ADMIN — ATROPINE SULFATE 0.5 MG: 0.1 INJECTION PARENTERAL at 10:55

## 2021-02-27 RX ADMIN — INSULIN LISPRO 3 UNITS: 100 INJECTION, SOLUTION INTRAVENOUS; SUBCUTANEOUS at 16:30

## 2021-02-27 RX ADMIN — CALCIUM GLUCONATE 1000 MG: 20 INJECTION, SOLUTION INTRAVENOUS at 15:00

## 2021-02-27 RX ADMIN — Medication 20 MCG/KG/MIN: at 13:00

## 2021-02-27 RX ADMIN — AZATHIOPRINE 100 MG: 50 TABLET ORAL at 09:57

## 2021-02-27 RX ADMIN — METHYLPREDNISOLONE SODIUM SUCCINATE 40 MG: 40 INJECTION, POWDER, FOR SOLUTION INTRAMUSCULAR; INTRAVENOUS at 13:26

## 2021-02-27 RX ADMIN — PIPERACILLIN AND TAZOBACTAM 3.38 G: 3; .375 INJECTION, POWDER, LYOPHILIZED, FOR SOLUTION INTRAVENOUS at 21:00

## 2021-02-27 RX ADMIN — HYDRALAZINE HYDROCHLORIDE 10 MG: 10 TABLET, FILM COATED ORAL at 09:57

## 2021-02-27 RX ADMIN — ROCURONIUM BROMIDE: 10 SOLUTION INTRAVENOUS at 13:00

## 2021-02-27 RX ADMIN — ATROPINE SULFATE 1 MG: 0.1 INJECTION PARENTERAL at 10:57

## 2021-02-27 RX ADMIN — Medication 1 CAPSULE: at 09:57

## 2021-02-27 RX ADMIN — SODIUM BICARBONATE 50 MEQ: 84 INJECTION INTRAVENOUS at 15:00

## 2021-02-27 RX ADMIN — ATROPINE SULFATE 1 MG: 0.1 INJECTION PARENTERAL at 11:09

## 2021-02-27 RX ADMIN — PANTOPRAZOLE SODIUM 40 MG: 40 TABLET, DELAYED RELEASE ORAL at 09:57

## 2021-02-27 RX ADMIN — HYDROXYZINE PAMOATE 50 MG: 50 CAPSULE ORAL at 01:57

## 2021-02-27 RX ADMIN — DOBUTAMINE IN DEXTROSE 5 MCG/KG/MIN: 200 INJECTION, SOLUTION INTRAVENOUS at 13:20

## 2021-02-27 RX ADMIN — VANCOMYCIN HYDROCHLORIDE 1000 MG: 1 INJECTION, POWDER, LYOPHILIZED, FOR SOLUTION INTRAVENOUS at 05:28

## 2021-02-27 RX ADMIN — AMINOPHYLLINE 0.3 MG/KG/HR: 25 INJECTION, SOLUTION INTRAVENOUS at 15:00

## 2021-02-27 RX ADMIN — CARVEDILOL 3.12 MG: 3.12 TABLET, FILM COATED ORAL at 09:57

## 2021-02-27 NOTE — CONSULTS
Consult    NAME: Aubree Weinberg   :  1950   MRN:  [id number]     Date/Time:  2021 10:55 AM    Patient PCP: None  ________________________________________________________________________     Assessment:   Primary cardiologist: Unknown    PROBLEM LIST:  1. Incomplete database secondary to altered mental status  2. Patient presents for shortness of breath   3. Acute hypoxic respiratory failure acquiring BiPAP  4. Pneumonia  5. Fluid overload, pulmonary edema  6. Goodpasture's syndrome  7. End-stage renal disease dialysis dependent  8. Profound bradycardia  9. Possible seizure disorder  10. Gastroesophageal reflux disease (GERD)    11. Pancytopenia  12. Electrolyte abnormalities (hyponatremia, hyperkalemia, hypochloremia, and hypocalcemia      [x]        High complexity decision making was performed        Subjective:   CHIEF COMPLAINT:     HISTORY OF PRESENT ILLNESS:     This 61-year-old -American female with no known coronary artery disease presents for evaluation of shortness of breath. The history is obtained from the patient's . The patient has altered mental status, and is unable to give any meaningful history. The patient's care is primarily at the [facility]. The patient does have a history of Goodpasture syndrome. As such she has been treated for pulmonary, and renal disorders. The patient is end-stage renal disease and dialysis dependent. According to the patient's  she did miss a dialysis treatment earlier in the week. The day of admission she began to complain of shortness of breath. He denies any chest pain, pressure or tightness. There may have been some orthopnea but no paroxysmal nocturnal dyspnea. Because of the breathing difficulty the patient presented to the emergency department. In the emergency department she was treated according to the \"chest pain protocol.   Her initial twelve-lead EKG, and cardiac enzymes revealed no evidence of acute infarction, or ischemia. Her clinical exam, imaging studies, and laboratory values were consistent with fluid overload. Subsequently the patient is admitted to general medicine with telemetry. Today, the patient had a rhythm change. She progressed from sinus rhythm to profound bradycardia. A \"rapid response\" was called. The patient was treated with atropine which was not beneficial, and subsequently started on dobutamine. Because heart rate remained low she is started on external pacing. Cardiology is consulted to assist in evaluation and management. Past Medical History:   Diagnosis Date    Chronic kidney disease     Heart failure ([facility] [address]       History reviewed. No pertinent surgical history. No Known Allergies   Meds:  See below  Social History     Tobacco Use    Smoking status: Never Smoker    Smokeless tobacco: Never Used   Substance Use Topics    Alcohol use: Not Currently      History reviewed. No pertinent family history. REVIEW OF SYSTEMS:     []         Unable to obtain  ROS due to ---   [x]         Total of 12 systems reviewed as follows:    Constitutional: negative fever, negative chills, negative weight loss  Eyes:   negative visual changes  ENT:   negative sore throat, tongue or lip swelling  Respiratory:  negative cough, negative dyspnea  Cards:  negative for chest pain, palpitations, lower extremity edema  GI:   negative for nausea, vomiting, diarrhea, and abdominal pain  Genitourinary: negative for frequency, dysuria  Integument:  negative for rash   Hematologic:  negative for easy bruising and gum/nose bleeding  Musculoskel: negative for myalgias,  back pain  Neurological:  negative for headaches, dizziness, vertigo, weakness  Behavl/Psych: negative for feelings of anxiety, depression     Pertinent Positives include :    Objective:      Physical Exam:    Last 24hrs VS reviewed since prior progress note.  Most recent are:    Visit Vitals  /72   Pulse (!) 54 Temp 97.4 °F (36.3 °C)   Resp (!) 34   Ht 5' 3\" (1.6 m)   Wt 68 kg (150 lb)   SpO2 96%   BMI 26.57 kg/m²       Intake/Output Summary (Last 24 hours) at 2/27/2021 1055  Last data filed at 2/26/2021 1800  Gross per 24 hour   Intake    Output 2500 ml   Net -2500 ml         General Appearance: Well developed, patient examined with BiPAP  Ears/Nose/Mouth/Throat: Pupils equal and round, Hearing grossly normal.  Neck: Supple. JVP within normal limits. Carotids good upstrokes, with no bruit. Chest: Lungs clear to auscultation bilaterally. Cardiovascular: JVP is not elevated, PMI is not attempted, external pacing leads are noted, normal intensity S1 and soft S2  Abdomen: Soft, non-tender, bowel sounds are active. No organomegaly. Extremities: No edema bilaterally. Femoral pulses +2, Distal Pulses +1. Skin: Warm and dry. Neuro: CN II-XII grossly intact, Strength and sensation grossly intact. Data:      Telemetry: Profound sinus bradycardia    EKG:  []  No new EKG for review  XR CHEST PORT   Final Result   Bilateral central airspace disease/edema right greater than left   showing slight worsening on the right. CTA CHEST W OR W WO CONT   Final Result   The findings may be a combination of extensive pneumonia,   pneumonitis and edema. No evidence of PE      XR CHEST SNGL V   Final Result      XR CHEST PORT    (Results Pending)   XR CHEST PORT    (Results Pending)        Prior to Admission medications    Medication Sig Start Date End Date Taking? Authorizing Provider   sevelamer carbonate (RENVELA) 800 mg tab tab Take 1 Tab by mouth three (3) times daily (with meals). 10/27/20  Yes Provider, Historical   azaTHIOprine (IMURAN) 50 mg tablet Take 2 Tabs by mouth daily. 5/13/20  Yes Provider, Historical   carvediloL (COREG) 3.125 mg tablet Take 1 Tab by mouth two (2) times a day. 4/6/20  Yes Provider, Historical   docusate sodium 100 mg tab Take 1 Tab by mouth daily as needed.  10/27/20  Yes Provider, Historical   b complex-vitamin c-folic acid 0.8 mg (Gabbi-Margaret) 0.8 mg tab tablet Take 1 Tab by mouth daily. 10/27/20  Yes Provider, Historical   hydrALAZINE (APRESOLINE) 10 mg tablet Take 1 Tab by mouth Before breakfast, lunch, dinner and at bedtime. 4/6/20  Yes Provider, Historical   labetaloL (NORMODYNE) 100 mg tablet Take 2 Tabs by mouth two (2) times a day. 6/24/20  Yes Provider, Historical   pantoprazole (PROTONIX) 40 mg tablet Take 1 Tab by mouth daily. Yes Provider, Historical       Recent Results (from the past 24 hour(s))   EKG, 12 LEAD, INITIAL    Collection Time: 02/26/21 12:35 PM   Result Value Ref Range    Ventricular Rate 77 BPM    Atrial Rate 77 BPM    P-R Interval 180 ms    QRS Duration 76 ms    Q-T Interval 444 ms    QTC Calculation (Bezet) 502 ms    Calculated P Axis 46 degrees    Calculated R Axis 32 degrees    Calculated T Axis 68 degrees    Diagnosis       Normal sinus rhythm  Prolonged QT  Abnormal ECG  When compared with ECG of 05-MAY-2020 10:59,  No significant change was found  Confirmed by Bonnie Haywood MD, Treasure Fuentes ([id number]) on 2/26/2021 1:10:26 PM     CBC WITH AUTOMATED DIFF    Collection Time: 02/26/21 12:45 PM   Result Value Ref Range    WBC 2.5 (L) 3.6 - 11.0 K/uL    RBC 3.15 (L) 3.80 - 5.20 M/uL    HGB 9.0 (L) 11.5 - 16.0 g/dL    HCT 28.3 (L) 35.0 - 47.0 %    MCV 89.8 80.0 - 99.0 FL    MCH 28.6 26.0 - 34.0 PG    MCHC 31.8 30.0 - 36.5 g/dL    RDW 16.6 (H) 11.5 - 14.5 %    PLATELET 483 (L) 585 - 400 K/uL    NEUTROPHILS 89 (H) 32 - 75 %    LYMPHOCYTES 9 (L) 12 - 49 %    MONOCYTES 2 (L) 5 - 13 %    EOSINOPHILS 0 0 - 7 %    BASOPHILS 0 0 - 1 %    IMMATURE GRANULOCYTES 0 0.0 - 0.5 %    ABS. NEUTROPHILS 2.2 1.8 - 8.0 K/UL    ABS. LYMPHOCYTES 0.2 (L) 0.8 - 3.5 K/UL    ABS. MONOCYTES 0.1 0.0 - 1.0 K/UL    ABS. EOSINOPHILS 0.0 0.0 - 0.4 K/UL    ABS. BASOPHILS 0.0 0.0 - 0.1 K/UL    ABS. IMM.  GRANS. 0.0 0.00 - 0.04 K/UL    DF AUTOMATED     SARS-COV-2    Collection Time: 02/26/21  1:36 PM   Result Value Ref Range SARS-CoV-2 Please find results under separate order     COVID-19 RAPID TEST    Collection Time: 02/26/21  1:36 PM   Result Value Ref Range    Specimen source Nasopharyngeal      COVID-19 rapid test Not Detected Not Detected     METABOLIC PANEL, COMPREHENSIVE    Collection Time: 02/26/21  1:54 PM   Result Value Ref Range    Sodium 133 (L) 136 - 145 mmol/L    Potassium 6.4 (H) 3.5 - 5.1 mmol/L    Chloride 96 (L) 97 - 108 mmol/L    CO2 28 21 - 32 mmol/L    Anion gap 9 5 - 15 mmol/L    Glucose 117 (H) 65 - 100 mg/dL    BUN 93 (H) 6 - 20 mg/dL    Creatinine 8.98 (H) 0.55 - 1.02 mg/dL    BUN/Creatinine ratio 10 (L) 12 - 20      GFR est AA 5 (L) >60 ml/min/1.73m2    GFR est non-AA 4 (L) >60 ml/min/1.73m2    Calcium 7.7 (L) 8.5 - 10.1 mg/dL    Bilirubin, total 0.4 0.2 - 1.0 mg/dL    AST (SGOT) 21 15 - 37 U/L    ALT (SGPT) 15 12 - 78 U/L    Alk. phosphatase 99 45 - 117 U/L    Protein, total 6.0 (L) 6.4 - 8.2 g/dL    Albumin 3.2 (L) 3.5 - 5.0 g/dL    Globulin 2.8 2.0 - 4.0 g/dL    A-G Ratio 1.1 1.1 - 2.2     BNP    Collection Time: 02/26/21  1:54 PM   Result Value Ref Range    NT pro-BNP 24,394 (H) <125 pg/mL   TROPONIN I    Collection Time: 02/26/21  1:54 PM   Result Value Ref Range    Troponin-I, Qt. <0.05 <0.05 ng/mL   LACTIC ACID    Collection Time: 02/26/21  2:30 PM   Result Value Ref Range    Lactic acid 1.5 0.4 - 2.0 mmol/L   BLOOD GAS, ARTERIAL    Collection Time: 02/27/21  4:00 AM   Result Value Ref Range    pH 7.32 (L) 7.35 - 7.45      PCO2 40 35 - 45 mmHg    PO2 73 (L) 75 - 100 mmHg    O2 SAT 93 (L) >95 %    BICARBONATE 20 (L) 22 - 26 mmol/L    BASE DEFICIT 5.5 (H) 0 - 2 mmol/L    O2 METHOD BiPAP      FIO2 100 %    SET RATE 14      EPAP/CPAP/PEEP 6      Sample source Arterial      SITE Right Radial      JOVITA'S TEST Positive             Plan:   1. Transfer to CCU  2.  Obtain serial cardiac enzymes  3. Continue to monitor serum electrolytes, and renal function  4. Obtain complete 2D echocardiogram  5.   Continue cardiovascular medications including continues dobutamine    6. Obtain transvenous continuous pacemaker when feasible  7.   Further recommendations depending on above results, and clinical course   Neal Cano MD

## 2021-02-27 NOTE — CONSULTS
Consult Date: 2/27/2021    Renal follow-up    Subjective Ye Talberte of presenting illness  Ms. Sona Mills a 56-year-old  female with a history of ESRD on hemodialysis, Goodpasture's syndrome, anemia of chronic disease, hypertension, steroid dependency, who came to the hospital for increasing generalized weakness, inability to do dialysis at home because of weakness and worsening dyspnea. She has been on home hemodialysis despite my recommendation to switch to IHD  Patient has been having difficulty to have better volume control and hypertension control at home  I strongly suggested patient to be switched back to IHD for more fluid removal during dialysis as  was not able to remove fluid as he was  concerned about hypotension and her getting passed out. Pt Continue to keep up with [facility] vasculitis clinic for Goodpasture's syndrome   Today patient received dialysis for 2 hours and only 2.5 L of fluid was removed  Morning patient became tachypneic, and into respiratory failure hence CODE BLUE called patient was resuscitated. I was not aware of any of these events though I went to see the patient in ICU and spoke to patient's   Patient is also seen by Dr. Guillermo Norris for complete heart block and they are trying to get pacemaker. Patient is also seen by Dr. Mark Anthony Mendoza for respiratory failure this morning. Appreciate both of their help  Patient is seen and examined in ICU after code. As expected she is unresponsive, BP is 133 systolic on both dopamine and Levophed  She is saturating only at 85% on FiO2 100%    Past Medical History:   Diagnosis Date    Chronic kidney disease     Heart failure ([address]       History reviewed. No pertinent surgical history. History reviewed. No pertinent family history.    Social History     Tobacco Use    Smoking status: Never Smoker    Smokeless tobacco: Never Used   Substance Use Topics    Alcohol use: Not Currently       Current Facility-Administered Medications   Medication Dose Route Frequency Provider Last Rate Last Admin    hydrOXYzine pamoate (VISTARIL) capsule 50 mg  50 mg Oral Q6H PRN Nayely George MD   50 mg at 02/27/21 0528    [START ON 3/2/2021] VANCOMYCIN INFORMATION NOTE   Other ONCE Nayely George MD        VANCOMYCIN INFORMATION NOTE 1 Each  1 Each Other Rx Dosing/Monitoring Nayely George MD        atropine 0.1 mg/mL injection             aminophylline 500 mg in dextrose 5% 250 mL infusion  0.3 mg/kg/hr IntraVENous CONTINUOUS Ascencion Garcia MD        DOBUTamine (DOBUTREX) 500 mg/250 mL (2,000 mcg/mL) infusion  5 mcg/kg/min IntraVENous CONTINUOUS Ascencion Garcia MD        rocuronium 10 mg/mL injection             NOREPINephrine (LEVOPHED) 8 mg/250 mL (32 mcg/mL) in dextrose 5% 250 mL (32 mcg/mL) infusion             NOREPINephrine (LEVOPHED) 16,000 mcg in dextrose 5% 250 mL infusion  2-16 mcg/min IntraVENous TITRATE Nayely George MD        insulin lispro (HUMALOG) injection   SubCUTAneous AC&HS Nayely George MD        glucose chewable tablet 16 g  4 Tab Oral PRN Nayely George MD        dextrose (D50W) injection syrg 12.5-25 g  25-50 mL IntraVENous PRN Nayely George MD        glucagon (GLUCAGEN) injection 1 mg  1 mg IntraMUSCular PRN Nayely George MD        methylPREDNISolone (PF) (SOLU-MEDROL) injection 40 mg  40 mg IntraVENous Q8H Kim George MD   40 mg at 02/27/21 0852    azaTHIOprine (IMURAN) tablet 100 mg  100 mg Oral DAILY Kim George MD   100 mg at 02/27/21 5917    vitamin B complex capsule 1 Cap  1 Cap Oral DAILY Kim George MD   1 Cap at 02/27/21 0957    pantoprazole (PROTONIX) tablet 40 mg  40 mg Oral DAILY Kim George MD   40 mg at 02/27/21 0957    sevelamer carbonate (RENVELA) tab 800 mg  800 mg Oral TID WITH MEALS Nayely George MD   Stopped at 02/27/21 0800    acetaminophen (TYLENOL) tablet 650 mg  650 mg Oral Q6H PRN Lm Pulliam MD        Or   Lee Baker acetaminophen (TYLENOL) suppository 650 mg  650 mg Rectal Q6H PRN Lena George MD        polyethylene glycol (MIRALAX) packet 17 g  17 g Oral DAILY PRN Lena George MD        ondansetron (ZOFRAN ODT) tablet 4 mg  4 mg Oral Q8H PRN Lena George MD        Or    ondansetron [facility]) injection 4 mg  4 mg IntraVENous Q6H PRN Lena George MD        piperacillin-tazobactam (ZOSYN) 3.375 g in 0.9% sodium chloride (MBP/ADV) 100 mL MBP  3.375 g IntraVENous Q12H Kim George  mL/hr at 02/27/21 0957 3.375 g at 02/27/21 0957       Review of systems cannot be performed as patient is intubated      Objective     Vital signs for last 24 hours:  Visit Vitals  BP (!) 81/51   Pulse (!) 33   Temp 97.4 °F (36.3 °C)   Resp (!) 34   Ht 5' 3\" (1.6 m)   Wt 68 kg (150 lb)   SpO2 100%   BMI 26.57 kg/m²       Intake/Output this shift:  Current Shift: No intake/output data recorded. Last 3 Shifts: 02/25 1901 - 02/27 0700  In: -   Out: 2500   Physical Exam   Constitutional: She is sedated and intubated. HENT:   Head: Normocephalic and atraumatic. Neck: Normal range of motion. Neck supple. JVD present. Pulmonary/Chest: Effort normal. She is intubated. Abdominal: Soft. Bowel sounds are normal.   Skin: Skin is warm and dry. Psychiatric: She has a normal mood and affect.  Her behavior is normal.   Nonlabored respiration  Not using accessory muscles  On nonrebreather at this time    Data Review:   Recent Results (from the past 24 hour(s))   SARS-COV-2    Collection Time: 02/26/21  1:36 PM   Result Value Ref Range    SARS-CoV-2 Please find results under separate order     COVID-19 RAPID TEST    Collection Time: 02/26/21  1:36 PM   Result Value Ref Range    Specimen source Nasopharyngeal      COVID-19 rapid test Not Detected Not Detected     METABOLIC PANEL, COMPREHENSIVE    Collection Time: 02/26/21  1:54 PM   Result Value Ref Range    Sodium 133 (L) 136 - 145 mmol/L    Potassium 6.4 (H) 3.5 - 5.1 mmol/L Chloride 96 (L) 97 - 108 mmol/L    CO2 28 21 - 32 mmol/L    Anion gap 9 5 - 15 mmol/L    Glucose 117 (H) 65 - 100 mg/dL    BUN 93 (H) 6 - 20 mg/dL    Creatinine 8.98 (H) 0.55 - 1.02 mg/dL    BUN/Creatinine ratio 10 (L) 12 - 20      GFR est AA 5 (L) >60 ml/min/1.73m2    GFR est non-AA 4 (L) >60 ml/min/1.73m2    Calcium 7.7 (L) 8.5 - 10.1 mg/dL    Bilirubin, total 0.4 0.2 - 1.0 mg/dL    AST (SGOT) 21 15 - 37 U/L    ALT (SGPT) 15 12 - 78 U/L    Alk.  phosphatase 99 45 - 117 U/L    Protein, total 6.0 (L) 6.4 - 8.2 g/dL    Albumin 3.2 (L) 3.5 - 5.0 g/dL    Globulin 2.8 2.0 - 4.0 g/dL    A-G Ratio 1.1 1.1 - 2.2     BNP    Collection Time: 02/26/21  1:54 PM   Result Value Ref Range    NT pro-BNP 24,394 (H) <125 pg/mL   TROPONIN I    Collection Time: 02/26/21  1:54 PM   Result Value Ref Range    Troponin-I, Qt. <0.05 <0.05 ng/mL   LACTIC ACID    Collection Time: 02/26/21  2:30 PM   Result Value Ref Range    Lactic acid 1.5 0.4 - 2.0 mmol/L   BLOOD GAS, ARTERIAL    Collection Time: 02/27/21  4:00 AM   Result Value Ref Range    pH 7.32 (L) 7.35 - 7.45      PCO2 40 35 - 45 mmHg    PO2 73 (L) 75 - 100 mmHg    O2 SAT 93 (L) >95 %    BICARBONATE 20 (L) 22 - 26 mmol/L    BASE DEFICIT 5.5 (H) 0 - 2 mmol/L    O2 METHOD BiPAP      FIO2 100 %    SET RATE 14      EPAP/CPAP/PEEP 6      Sample source Arterial      SITE Right Radial      JOVITA'S TEST Positive     BLOOD GAS, ARTERIAL    Collection Time: 02/27/21 10:50 AM   Result Value Ref Range    pH 7.40 7.35 - 7.45      PCO2 31 (L) 35 - 45 mmHg    PO2 63 (L) 75 - 100 mmHg    O2 SAT 92 (L) >95 %    BICARBONATE 21 (L) 22 - 26 mmol/L    BASE DEFICIT 4.2 (H) 0 - 2 mmol/L    O2 METHOD Non-invasive ventilation      FIO2 100.0 %    MODE BiPAP      SET RATE 14      EPAP/CPAP/PEEP 6.0      SITE Right Radial      JOVITA'S TEST PASS      Critical value read back Andriaguillermina Caballero MD    GLUCOSE, POC    Collection Time: 02/27/21 10:53 AM   Result Value Ref Range    Glucose (POC) 214 (H) 65 - 100 mg/dL Performed by Isabel Boone          XR CHEST PORT   Final Result   Persistent bilateral airspace disease showing slight improvement   compared to earlier study. XR CHEST PORT   Final Result   Bilateral central airspace disease/edema right greater than left   showing slight worsening on the right. CTA CHEST W OR W WO CONT   Final Result   The findings may be a combination of extensive pneumonia,   pneumonitis and edema. No evidence of PE      XR CHEST SNGL V   Final Result      XR CHEST PORT    (Results Pending)        Patient Active Problem List   Diagnosis Code    PNA (pneumonia) J18.9    SOB (shortness of breath) R06.02        DIAGNOSES:  S/p CARDIO RESPIRATORY ARREST     R/O hyperkalemia  1. ESRD on hemodialysis dialysis  2. Pneumonia versus ARDS versus fluid overload  3. History of Goodpasture's syndrome  4. Secondary hyperparathyroidism  5. Anemia of chronic kidney disease  6. PLAN:  Stat CMP -to rule out hyperkalemia  Stat CBC- to ensure H/H  Didn't drop acutely. Stat ABG  HD VS CRRT -to remove fluid and stabilize electrolytes  We will concentrate all IV medications  Case is discussed with patient's    stat labs revealed potassium of 6.2  And bicarbonate 22, hence I called ICU to treat patient with 1 amp of calcium gluconate IV stat and 1 amp of sodium bicarbonate IV stat-  Given in cardiac cath lab   then when I called  ICU,  Patient is being dialyzed  With 1 K bath , however she is having frequent PVCs   hence HD RN is instructed to change the bath to 3 K bath  First hr  And if patient is stabilized without any PVCs, then potassium bath  can be switched to  2 K. I also instructed RN to get stat BMP from peripheral vein to see how the potassium is    they also need to check stat magnesium level to ensure magnesium level is okay.

## 2021-02-27 NOTE — PROGRESS NOTES
Consult for Vancomycin Dosing by Pharmacy by Dr. Lamine Draper provided for this 72y.o. year old female on HD (Tu/Th/Sa ???), for indication of Asp. PNA. Day of Therapy: 1     Goal of Level(s): 15 - 20 mcg/dL    Other Current Antibiotics:  Zosyn      Serum Creatinine Creatinine   Date Value Ref Range Status   02/26/2021 8.98 (H) 0.55 - 1.02 mg/dL Final      Creatinine Clearance Estimated Creatinine Clearance: 5.5 mL/min (A) (based on SCr of 8.98 mg/dL (H)). BUN Lab Results   Component Value Date/Time    BUN 93 (H) 02/26/2021 01:54 PM      WBC Lab Results   Component Value Date/Time    WBC 2.5 (L) 02/26/2021 12:45 PM      Temp 98.1 °F (36.7 °C)     Last Level: No results found for: VANCT No results found for: VANCR    Ht Readings from Last 1 Encounters:   02/26/21 160 cm (63\")        Wt Readings from Last 1 Encounters:   02/26/21 68 kg (150 lb)     Ideal body weight: 52.4 kg (115 lb 8.3 oz)  Adjusted ideal body weight: 58.7 kg (129 lb 5 oz)     Previous Regimen: New Start    New Regimen:     Start Vancomycin therapy, with an initial dose of 1000 mg iv x 1 today. Will order a pre-HD random level for Tuesday (3/2) morning with AM labs. Pharmacy to follow daily and will make changes to dose and/or frequency based on clinical status.   _________________________________     Pharmacist [address] [place] [address], PHARMD

## 2021-02-27 NOTE — H&P
History and Physical    NAME: Etta Ware   :  1950   MRN:  [id number]     Date/Time:  2021 10:13 PM    Patient PCP: None  ______________________________________________________________________             Subjective:     CHIEF COMPLAINT:     Shortness of breath      HISTORY OF PRESENT ILLNESS:       Patient is a 72y.o. year old female PMH significant for ESRD with home dialysis that presented to the ED by EMS for increasing weakness and shortness of breath after being unable to perform her home dialysis. She was hypoxic with oxygen saturations in the 80's refractory to supplemental oxygen and a non-rebreather. She was subsequently placed on Bipap. Nephrology saw the patient in the ED and she was emergently dialysed this afternoon. Her CXR and CTA of the chest revealed pulmonary edema and likely pneumonia. Blood cultures were sent in the ED and she was given Azithromycin and Ceftriaxone. She was also given solu-medrol. She is a chronic steroid user. Her renal function, potassium, and BNP are noted to be elevated prior to receiving emergent dialysis today. Past Medical History:   Diagnosis Date    Chronic kidney disease     Heart failure ([address]         History reviewed. No pertinent surgical history. Social History     Tobacco Use    Smoking status: Never Smoker    Smokeless tobacco: Never Used   Substance Use Topics    Alcohol use: Not Currently        History reviewed. No pertinent family history. No Known Allergies     Prior to Admission medications    Medication Sig Start Date End Date Taking? Authorizing Provider   sevelamer carbonate (RENVELA) 800 mg tab tab Take 1 Tab by mouth three (3) times daily (with meals). 10/27/20  Yes Provider, Historical   azaTHIOprine (IMURAN) 50 mg tablet Take 2 Tabs by mouth daily. 20  Yes Provider, Historical   carvediloL (COREG) 3.125 mg tablet Take 1 Tab by mouth two (2) times a day.  20  Yes Provider, Historical   docusate sodium 100 mg tab Take 1 Tab by mouth daily as needed. 10/27/20  Yes Provider, Historical   b complex-vitamin c-folic acid 0.8 mg (Gabbi-Margaret) 0.8 mg tab tablet Take 1 Tab by mouth daily. 10/27/20  Yes Provider, Historical   hydrALAZINE (APRESOLINE) 10 mg tablet Take 1 Tab by mouth Before breakfast, lunch, dinner and at bedtime. 4/6/20  Yes Provider, Historical   labetaloL (NORMODYNE) 100 mg tablet Take 2 Tabs by mouth two (2) times a day. 6/24/20  Yes Provider, Historical   pantoprazole (PROTONIX) 40 mg tablet Take 1 Tab by mouth daily.    Yes Provider, Historical         Current Facility-Administered Medications:     azithromycin (ZITHROMAX) 500 mg in 0.9% sodium chloride 250 mL (VIAL-MATE), 500 mg, IntraVENous, NOW, Manjit, Yoni Lindsey MD, 500 mg at 02/26/21 2153    methylPREDNISolone (PF) (SOLU-MEDROL) injection 40 mg, 40 mg, IntraVENous, Q8H, Nasir Rajput MD, 40 mg at 02/26/21 2148    LAB DATA REVIEWED:    Recent Results (from the past 24 hour(s))   EKG, 12 LEAD, INITIAL    Collection Time: 02/26/21 12:35 PM   Result Value Ref Range    Ventricular Rate 77 BPM    Atrial Rate 77 BPM    P-R Interval 180 ms    QRS Duration 76 ms    Q-T Interval 444 ms    QTC Calculation (Bezet) 502 ms    Calculated P Axis 46 degrees    Calculated R Axis 32 degrees    Calculated T Axis 68 degrees    Diagnosis       Normal sinus rhythm  Prolonged QT  Abnormal ECG  When compared with ECG of 05-MAY-2020 10:59,  No significant change was found  Confirmed by Danika Root MD, BRENDA ([id number]) on 2/26/2021 1:10:26 PM     CBC WITH AUTOMATED DIFF    Collection Time: 02/26/21 12:45 PM   Result Value Ref Range    WBC 2.5 (L) 3.6 - 11.0 K/uL    RBC 3.15 (L) 3.80 - 5.20 M/uL    HGB 9.0 (L) 11.5 - 16.0 g/dL    HCT 28.3 (L) 35.0 - 47.0 %    MCV 89.8 80.0 - 99.0 FL    MCH 28.6 26.0 - 34.0 PG    MCHC 31.8 30.0 - 36.5 g/dL    RDW 16.6 (H) 11.5 - 14.5 %    PLATELET 468 (L) 004 - 400 K/uL    NEUTROPHILS 89 (H) 32 - 75 %    LYMPHOCYTES 9 (L) 12 - 49 %    MONOCYTES 2 (L) 5 - 13 %    EOSINOPHILS 0 0 - 7 %    BASOPHILS 0 0 - 1 %    IMMATURE GRANULOCYTES 0 0.0 - 0.5 %    ABS. NEUTROPHILS 2.2 1.8 - 8.0 K/UL    ABS. LYMPHOCYTES 0.2 (L) 0.8 - 3.5 K/UL    ABS. MONOCYTES 0.1 0.0 - 1.0 K/UL    ABS. EOSINOPHILS 0.0 0.0 - 0.4 K/UL    ABS. BASOPHILS 0.0 0.0 - 0.1 K/UL    ABS. IMM. GRANS. 0.0 0.00 - 0.04 K/UL    DF AUTOMATED     SARS-COV-2    Collection Time: 02/26/21  1:36 PM   Result Value Ref Range    SARS-CoV-2 Please find results under separate order     COVID-19 RAPID TEST    Collection Time: 02/26/21  1:36 PM   Result Value Ref Range    Specimen source Nasopharyngeal      COVID-19 rapid test Not Detected Not Detected     METABOLIC PANEL, COMPREHENSIVE    Collection Time: 02/26/21  1:54 PM   Result Value Ref Range    Sodium 133 (L) 136 - 145 mmol/L    Potassium 6.4 (H) 3.5 - 5.1 mmol/L    Chloride 96 (L) 97 - 108 mmol/L    CO2 28 21 - 32 mmol/L    Anion gap 9 5 - 15 mmol/L    Glucose 117 (H) 65 - 100 mg/dL    BUN 93 (H) 6 - 20 mg/dL    Creatinine 8.98 (H) 0.55 - 1.02 mg/dL    BUN/Creatinine ratio 10 (L) 12 - 20      GFR est AA 5 (L) >60 ml/min/1.73m2    GFR est non-AA 4 (L) >60 ml/min/1.73m2    Calcium 7.7 (L) 8.5 - 10.1 mg/dL    Bilirubin, total 0.4 0.2 - 1.0 mg/dL    AST (SGOT) 21 15 - 37 U/L    ALT (SGPT) 15 12 - 78 U/L    Alk.  phosphatase 99 45 - 117 U/L    Protein, total 6.0 (L) 6.4 - 8.2 g/dL    Albumin 3.2 (L) 3.5 - 5.0 g/dL    Globulin 2.8 2.0 - 4.0 g/dL    A-G Ratio 1.1 1.1 - 2.2     BNP    Collection Time: 02/26/21  1:54 PM   Result Value Ref Range    NT pro-BNP 24,394 (H) <125 pg/mL   TROPONIN I    Collection Time: 02/26/21  1:54 PM   Result Value Ref Range    Troponin-I, Qt. <0.05 <0.05 ng/mL   LACTIC ACID    Collection Time: 02/26/21  2:30 PM   Result Value Ref Range    Lactic acid 1.5 0.4 - 2.0 mmol/L       XR Results (most recent):  Results from Hospital Encounter encounter on 02/26/21   XR CHEST SNGL V    Narrative 1 new comparison April 16    Central line remains    Moderate severity patchy mixed infiltrate right infrahilar and left midlung. No  effusion or pneumothorax. Normal heart and mediastinum          CTA CHEST W OR W WO CONT   Final Result   The findings may be a combination of extensive pneumonia,   pneumonitis and edema. No evidence of PE      XR CHEST SNGL V   Final Result      XR CHEST PORT    (Results Pending)        Review of Systems:  Michaela 121  Patient on Bipap and does not wish to answer further questions for ROS. Objective:   VITALS:    Visit Vitals  BP (!) 163/105   Pulse 77   Temp 98.1 °F (36.7 °C)   Resp 22   Ht 5' 3\" (1.6 m)   Wt 68 kg (150 lb)   SpO2 100%   BMI 26.57 kg/m²       Physical Exam:   Constitutional: CHRONICALLY ILL APPEARING, pt is oriented to person, place, and time. Head: Normocephalic and atraumatic. Eyes: Pupils are equal, round, and reactive to light. Cardiovascular: Normal rate, regular rhythm and normal heart sounds. No murmur. Pulmonary/Chest: DIMINISHED BREATH SOUNDS GLOBALLY. ON BIPAP CURRENTLY. INCREASED WORK OF BREATHING  Abdominal: Soft. Bowel sounds are normoactive x 4 quadrants. There is no abdominal tenderness. There is no rebound and no guarding. Musculoskeletal: Moves all extremities  Neurological: Alert and oriented.  SHORT OF BREATH AND UNABLE TO DO COMPLETE NEURO EXAM.      ASSESSMENT & PLAN  Acute hypoxic respiratory failure  Elevated BNP     Likely secondary to pulmonary edema and pneumonia     COVID negative     Flu negative     Hypoxic on room air and non-rebreather      Continue bipap to maintain saturations     Had 2.5 liters of fluid removed in emergent dialysis today     Plan for dialysis tomorrow as well     ABG in the morning ordered     Pulmonary consulted     Continue solumedrol     On Azithromycin     Blood cultures pending     Will likely need echocardiogram     Repeat CXR tomorrow if patient receives dialysis  ESRD on hemodialysis  Hyperkalemia      Nephrology following      Plan for dialysis tomorrow      Recheck labs in the morning/during dialysis  History of Goodpasture's syndrome  Anemia of chronic disease      Continue to monitor - am labs ordered      Patient is full code this admission    Discussed with Dr. Ian Welch          ________________________________________________________________________    Signed: Queta Fuentes NP

## 2021-02-27 NOTE — PROGRESS NOTES
General Daily Progress Note          Patient Name:   Fabiano Bansal       YOB: 1950       Age:  72 y.o. Admit Date: 2/26/2021      Subjective: This morning patient was hypoxic bradycardic  Seen by the cardiology put on temporary pacemaker  Fluid overload had dialysis yesterday  Patient transferred to ICU patient was hypotensive bradycardic  Start on dopamine drip and Levophed drip  Anesthesia was consulted patient was intubated  Discussed with the patient's  want everything done  Overall prognosis very poor           Objective:     Visit Vitals  BP (!) 81/51   Pulse (!) 33   Temp 97.4 °F (36.3 °C)   Resp (!) 34   Ht 5' 3\" (1.6 m)   Wt 68 kg (150 lb)   SpO2 100%   BMI 26.57 kg/m²        Recent Results (from the past 24 hour(s))   SARS-COV-2    Collection Time: 02/26/21  1:36 PM   Result Value Ref Range    SARS-CoV-2 Please find results under separate order     COVID-19 RAPID TEST    Collection Time: 02/26/21  1:36 PM   Result Value Ref Range    Specimen source Nasopharyngeal      COVID-19 rapid test Not Detected Not Detected     METABOLIC PANEL, COMPREHENSIVE    Collection Time: 02/26/21  1:54 PM   Result Value Ref Range    Sodium 133 (L) 136 - 145 mmol/L    Potassium 6.4 (H) 3.5 - 5.1 mmol/L    Chloride 96 (L) 97 - 108 mmol/L    CO2 28 21 - 32 mmol/L    Anion gap 9 5 - 15 mmol/L    Glucose 117 (H) 65 - 100 mg/dL    BUN 93 (H) 6 - 20 mg/dL    Creatinine 8.98 (H) 0.55 - 1.02 mg/dL    BUN/Creatinine ratio 10 (L) 12 - 20      GFR est AA 5 (L) >60 ml/min/1.73m2    GFR est non-AA 4 (L) >60 ml/min/1.73m2    Calcium 7.7 (L) 8.5 - 10.1 mg/dL    Bilirubin, total 0.4 0.2 - 1.0 mg/dL    AST (SGOT) 21 15 - 37 U/L    ALT (SGPT) 15 12 - 78 U/L    Alk.  phosphatase 99 45 - 117 U/L    Protein, total 6.0 (L) 6.4 - 8.2 g/dL    Albumin 3.2 (L) 3.5 - 5.0 g/dL    Globulin 2.8 2.0 - 4.0 g/dL    A-G Ratio 1.1 1.1 - 2.2     BNP    Collection Time: 02/26/21  1:54 PM   Result Value Ref Range    NT pro-BNP 24,394 (H) <125 pg/mL   TROPONIN I    Collection Time: 02/26/21  1:54 PM   Result Value Ref Range    Troponin-I, Qt. <0.05 <0.05 ng/mL   LACTIC ACID    Collection Time: 02/26/21  2:30 PM   Result Value Ref Range    Lactic acid 1.5 0.4 - 2.0 mmol/L   BLOOD GAS, ARTERIAL    Collection Time: 02/27/21  4:00 AM   Result Value Ref Range    pH 7.32 (L) 7.35 - 7.45      PCO2 40 35 - 45 mmHg    PO2 73 (L) 75 - 100 mmHg    O2 SAT 93 (L) >95 %    BICARBONATE 20 (L) 22 - 26 mmol/L    BASE DEFICIT 5.5 (H) 0 - 2 mmol/L    O2 METHOD BiPAP      FIO2 100 %    SET RATE 14      EPAP/CPAP/PEEP 6      Sample source Arterial      SITE Right Radial      JOVITA'S TEST Positive     BLOOD GAS, ARTERIAL    Collection Time: 02/27/21 10:50 AM   Result Value Ref Range    pH 7.40 7.35 - 7.45      PCO2 31 (L) 35 - 45 mmHg    PO2 63 (L) 75 - 100 mmHg    O2 SAT 92 (L) >95 %    BICARBONATE 21 (L) 22 - 26 mmol/L    BASE DEFICIT 4.2 (H) 0 - 2 mmol/L    O2 METHOD Non-invasive ventilation      FIO2 100.0 %    MODE BiPAP      SET RATE 14      EPAP/CPAP/PEEP 6.0      SITE Right Radial      JOVITA'S TEST PASS      Critical value read back Donal Isaacs MD    GLUCOSE, POC    Collection Time: 02/27/21 10:53 AM   Result Value Ref Range    Glucose (POC) 214 (H) 65 - 100 mg/dL    Performed by Damaris Kehr      [unfilled]      Review of Systems  Unable to obtain. Physical Exam:      Constitutional: Very lethargic and short of breath  HENT:   Head: Normocephalic and atraumatic. Eyes: Pupils are equal, round, and reactive to light. EOM are normal.   Cardiovascular: Normal rate, regular rhythm and normal heart sounds. Pulmonary/Chest: Decreased breath sounds   abdominal: Soft. Bowel sounds are normal. There is no abdominal tenderness. There is no rebound and no guarding. Musculoskeletal: Normal range of motion.    Neurological: Lethargic and sleepy    XR CHEST PORT   Final Result   Persistent bilateral airspace disease showing slight improvement compared to earlier study. XR CHEST PORT   Final Result   Bilateral central airspace disease/edema right greater than left   showing slight worsening on the right. CTA CHEST W OR W WO CONT   Final Result   The findings may be a combination of extensive pneumonia,   pneumonitis and edema. No evidence of PE      XR CHEST SNGL V   Final Result      XR CHEST PORT    (Results Pending)        Recent Results (from the past 24 hour(s))   SARS-COV-2    Collection Time: 02/26/21  1:36 PM   Result Value Ref Range    SARS-CoV-2 Please find results under separate order     COVID-19 RAPID TEST    Collection Time: 02/26/21  1:36 PM   Result Value Ref Range    Specimen source Nasopharyngeal      COVID-19 rapid test Not Detected Not Detected     METABOLIC PANEL, COMPREHENSIVE    Collection Time: 02/26/21  1:54 PM   Result Value Ref Range    Sodium 133 (L) 136 - 145 mmol/L    Potassium 6.4 (H) 3.5 - 5.1 mmol/L    Chloride 96 (L) 97 - 108 mmol/L    CO2 28 21 - 32 mmol/L    Anion gap 9 5 - 15 mmol/L    Glucose 117 (H) 65 - 100 mg/dL    BUN 93 (H) 6 - 20 mg/dL    Creatinine 8.98 (H) 0.55 - 1.02 mg/dL    BUN/Creatinine ratio 10 (L) 12 - 20      GFR est AA 5 (L) >60 ml/min/1.73m2    GFR est non-AA 4 (L) >60 ml/min/1.73m2    Calcium 7.7 (L) 8.5 - 10.1 mg/dL    Bilirubin, total 0.4 0.2 - 1.0 mg/dL    AST (SGOT) 21 15 - 37 U/L    ALT (SGPT) 15 12 - 78 U/L    Alk.  phosphatase 99 45 - 117 U/L    Protein, total 6.0 (L) 6.4 - 8.2 g/dL    Albumin 3.2 (L) 3.5 - 5.0 g/dL    Globulin 2.8 2.0 - 4.0 g/dL    A-G Ratio 1.1 1.1 - 2.2     BNP    Collection Time: 02/26/21  1:54 PM   Result Value Ref Range    NT pro-BNP 24,394 (H) <125 pg/mL   TROPONIN I    Collection Time: 02/26/21  1:54 PM   Result Value Ref Range    Troponin-I, Qt. <0.05 <0.05 ng/mL   LACTIC ACID    Collection Time: 02/26/21  2:30 PM   Result Value Ref Range    Lactic acid 1.5 0.4 - 2.0 mmol/L   BLOOD GAS, ARTERIAL    Collection Time: 02/27/21  4:00 AM   Result Value Ref Range    pH 7.32 (L) 7.35 - 7.45      PCO2 40 35 - 45 mmHg    PO2 73 (L) 75 - 100 mmHg    O2 SAT 93 (L) >95 %    BICARBONATE 20 (L) 22 - 26 mmol/L    BASE DEFICIT 5.5 (H) 0 - 2 mmol/L    O2 METHOD BiPAP      FIO2 100 %    SET RATE 14      EPAP/CPAP/PEEP 6      Sample source Arterial      SITE Right Radial      JOVITA'S TEST Positive     BLOOD GAS, ARTERIAL    Collection Time: 02/27/21 10:50 AM   Result Value Ref Range    pH 7.40 7.35 - 7.45      PCO2 31 (L) 35 - 45 mmHg    PO2 63 (L) 75 - 100 mmHg    O2 SAT 92 (L) >95 %    BICARBONATE 21 (L) 22 - 26 mmol/L    BASE DEFICIT 4.2 (H) 0 - 2 mmol/L    O2 METHOD Non-invasive ventilation      FIO2 100.0 %    MODE BiPAP      SET RATE 14      EPAP/CPAP/PEEP 6.0      SITE Right Radial      JOVITA'S TEST PASS      Critical value read back Alannah Schneider MD    GLUCOSE, POC    Collection Time: 02/27/21 10:53 AM   Result Value Ref Range    Glucose (POC) 214 (H) 65 - 100 mg/dL    Performed by Angelito Lechuga        Results     Procedure Component Value Units Date/Time    CULTURE, BLOOD #1 [[id number]] Collected: 02/27/21 0158    Order Status: Completed Specimen: Blood Updated: 02/27/21 0312    CULTURE, BLOOD #2 [[id number]] Collected: 02/27/21 0145    Order Status: Completed Specimen: Blood Updated: 02/27/21 0313    CULTURE, BLOOD, PAIRED [[id number]] Collected: 02/26/21 1430    Order Status: Completed Specimen: Blood Updated: 02/26/21 2111    CULTURE, BLOOD [[id number]] Collected: 02/26/21 1430    Order Status: Canceled Specimen: Blood     COVID-19 RAPID TEST [[id number]] Collected: 02/26/21 1336    Order Status: Completed Specimen: Nasopharyngeal Updated: 02/26/21 1409     Specimen source Nasopharyngeal        COVID-19 rapid test Not Detected        Comment: Rapid Abbott ID Now   Rapid NAAT:  The specimen is NEGATIVE for SARS-CoV-2, the novel coronavirus associated with COVID-19.    Negative results should be treated as presumptive and, if inconsistent with clinical signs and symptoms or necessary for patient management, should be tested with an alternative molecular assay. Negative results do not preclude SARS-CoV-2 infection and should not be used as the sole basis for patient management decisions. This test has been authorized by the FDA under   an Emergency Use Authorization (EUA) for use by authorized laboratories. Fact sheet for Healthcare Providers: Viddlerdate.co.nz Fact sheet for Patients: PLUMgrid.co.nz   Methodology: Isothermal Nucleic Acid Amplification                Labs:     Recent Labs     02/26/21  1245   WBC 2.5*   HGB 9.0*   HCT 28.3*   *     Recent Labs     02/26/21  1354   *   K 6.4*   CL 96*   CO2 28   BUN 93*   CREA 8.98*   *   CA 7.7*     Recent Labs     02/26/21  1354   ALT 15   AP 99   TBILI 0.4   TP 6.0*   ALB 3.2*   GLOB 2.8     No results for input(s): INR, PTP, APTT, INREXT in the last 72 hours. No results for input(s): FE, TIBC, PSAT, FERR in the last 72 hours.    No results found for: FOL, RBCF   Recent Labs     02/27/21  1050 02/27/21  0400   PH 7.40 7.32*   PCO2 31* 40   PO2 63* 73*     Recent Labs     02/26/21  1354   TROIQ <0.05     No results found for: CHOL, CHOLX, CHLST, CHOLV, HDL, HDLP, LDL, LDLC, DLDLP, TGLX, TRIGL, TRIGP, CHHD, CHHDX  Lab Results   Component Value Date/Time    Glucose (POC) 214 (H) 02/27/2021 10:53 AM     No results found for: COLOR, APPRN, SPGRU, REFSG, YESY, PROTU, GLUCU, KETU, BILU, UROU, CARTER, LEUKU, GLUKE, EPSU, BACTU, WBCU, RBCU, CASTS, UCRY      Assessment:     Acute hypoxemic respiratory failure on ventilator now  Severe symptomatic bradycardia on Toprol urinary sphincter  End-stage renal disease on hemodialysis at home  Hypotensive  Goodpasture's syndrome  Fluid overload  Hyperkalemia  Anemia of chronic disease      Plan:     Cardiology consult for permanent pacemaker  Continue dobutamine and Levophed  On vancomycin and IV Zosyn    Follow-up with nephrology cardiology and pulmonologist    Overall prognosis very poor  Discussed with the patient  about DNR status  At this time patient is full code  Repeat the labs    Discussed with the cardiology and the nephrology    I spent critical care time 30 minutes independently        Current Facility-Administered Medications:     hydrOXYzine pamoate (VISTARIL) capsule 50 mg, 50 mg, Oral, Q6H PRN, Timmy Boateng MD, 50 mg at 02/27/21 0528    [START ON 3/2/2021] VANCOMYCIN INFORMATION NOTE, , Other, ONCE, Juanjose George MD    VANCOMYCIN INFORMATION NOTE 1 Each, 1 Each, Other, Rx Dosing/Monitoring, Timmy Boateng MD    atropine 0.1 mg/mL injection, , , ,     aminophylline 500 mg in dextrose 5% 250 mL infusion, 0.3 mg/kg/hr, IntraVENous, CONTINUOUS, Enrique Garcia MD    DOBUTamine (DOBUTREX) 500 mg/250 mL (2,000 mcg/mL) infusion, 5 mcg/kg/min, IntraVENous, CONTINUOUS, Enrique Garcia MD    rocuronium 10 mg/mL injection, , , ,     NOREPINephrine (LEVOPHED) 8 mg/250 mL (32 mcg/mL) in dextrose 5% 250 mL (32 mcg/mL) infusion, , , ,     NOREPINephrine (LEVOPHED) 16,000 mcg in dextrose 5% 250 mL infusion, 2-16 mcg/min, IntraVENous, TITRATE, Juanjose George MD    insulin lispro (HUMALOG) injection, , SubCUTAneous, AC&HS, Juanjose George MD    glucose chewable tablet 16 g, 4 Tab, Oral, PRN, Kim George MD    dextrose (D50W) injection syrg 12.5-25 g, 25-50 mL, IntraVENous, PRN, Juanjose George MD    glucagon (GLUCAGEN) injection 1 mg, 1 mg, IntraMUSCular, PRN, Kim George MD    methylPREDNISolone (PF) (SOLU-MEDROL) injection 40 mg, 40 mg, IntraVENous, Q8H, Kim George MD, 40 mg at 02/27/21 4197    azaTHIOprine (IMURAN) tablet 100 mg, 100 mg, Oral, DAILY, Kim George MD, 100 mg at 02/27/21 0957    vitamin B complex capsule 1 Cap, 1 Cap, Oral, DAILY, Kim George MD, 1 Cap at 02/27/21 0957    hydrALAZINE (APRESOLINE) tablet 10 mg, 10 mg, Oral, AC&HS, Juanjose George, MD, Stopped at 02/27/21 1130    pantoprazole (PROTONIX) tablet 40 mg, 40 mg, Oral, DAILY, Kim George MD, 40 mg at 02/27/21 0957    sevelamer carbonate (RENVELA) tab 800 mg, 800 mg, Oral, TID WITH MEALS, Nida George MD, Stopped at 02/27/21 0800    acetaminophen (TYLENOL) tablet 650 mg, 650 mg, Oral, Q6H PRN **OR** acetaminophen (TYLENOL) suppository 650 mg, 650 mg, Rectal, Q6H PRN, Nida George MD    polyethylene glycol (MIRALAX) packet 17 g, 17 g, Oral, DAILY PRN, Nida George MD    ondansetron (ZOFRAN ODT) tablet 4 mg, 4 mg, Oral, Q8H PRN **OR** ondansetron (ZOFRAN) injection 4 mg, 4 mg, IntraVENous, Q6H PRN, Kim George MD    piperacillin-tazobactam (ZOSYN) 3.375 g in 0.9% sodium chloride (MBP/ADV) 100 mL MBP, 3.375 g, IntraVENous, Q12H, Kim George MD, Last Rate: 200 mL/hr at 02/27/21 0957, 3.375 g at 02/27/21 8472

## 2021-02-27 NOTE — PROGRESS NOTES
Transferred to ICU room 284, transported via bed, with nursing supervisor, respiratory therapy and bedside RN. Patient currently on bipap, dopamine 20mcg/kg/min and transcutaneous pacing.   Report given at bedside

## 2021-02-27 NOTE — CONSULTS
PULMONARY NOTE  [facility]    Name: Millicent Pires MRN: [id number]   : 1950 Hospital: [address] [place] [address]   Date: 2021  Admission date: 2021 Hospital Day: 2       HPI:     Hospital Problems  Never Reviewed          Codes Class Noted POA    PNA (pneumonia) ICD-10-CM: J18.9  ICD-9-CM: 486  2021 Unknown        SOB (shortness of breath) ICD-10-CM: R06.02  ICD-9-CM: 786.05  2021 Unknown                   [x] High complexity decision making was performed  [x] See my orders for details      Subjective/Initial History:     I was asked by Sindy Balbuena MD to see Millicent Pires  a 72 y.o.  female in consultation     Excerpts from admission 2021 or consult notes as follows:   62-year-old lady came in because of shortness of breath and dyspnea significant past medical history of end-stage renal disease on hemodialysis, Goodpasture syndrome anemia of chronic disease hypertension steroid-dependent, she was not able to dialysis at home because of generalized weakness and shortness of breath she has been on home dialysis no fever no chills she was put on 100% nonrebreather mask which has been switched to noninvasive ventilator not she is going for dialysis because of severe hypoxia so pulmonary critical care consult was called for further evaluation. She is a lifetime non-smoker.       No Known Allergies     MAR reviewed and pertinent medications noted or modified as needed     Current Facility-Administered Medications   Medication    hydrOXYzine pamoate (VISTARIL) capsule 50 mg    [START ON 3/2/2021] VANCOMYCIN INFORMATION NOTE    VANCOMYCIN INFORMATION NOTE 1 Each    methylPREDNISolone (PF) (SOLU-MEDROL) injection 40 mg    azaTHIOprine (IMURAN) tablet 100 mg    vitamin B complex capsule 1 Cap    carvediloL (COREG) tablet 3.125 mg    hydrALAZINE (APRESOLINE) tablet 10 mg    labetaloL (NORMODYNE) tablet 200 mg    pantoprazole (PROTONIX) tablet 40 mg    sevelamer carbonate (RENVELA) tab 800 mg    acetaminophen (TYLENOL) tablet 650 mg    Or    acetaminophen (TYLENOL) suppository 650 mg    polyethylene glycol (MIRALAX) packet 17 g    ondansetron (ZOFRAN ODT) tablet 4 mg    Or    ondansetron (ZOFRAN) injection 4 mg    piperacillin-tazobactam (ZOSYN) 3.375 g in 0.9% sodium chloride (MBP/ADV) 100 mL MBP      Patient PCP: None  PMH:  has a past medical history of Chronic kidney disease and Heart failure ([facility] [address] PSH:   has no past surgical history on file. FHX: family history is not on file. SHX:  reports that she has never smoked. She has never used smokeless tobacco. She reports previous alcohol use. She reports previous drug use. ROS:  Unable to obtain as patient was lethargic and severely short of breath      Objective:     Vital Signs: Telemetry:    normal sinus rhythm Intake/Output:   Visit Vitals  /72   Pulse (!) 54   Temp 97.4 °F (36.3 °C)   Resp (!) 34   Ht 5' 3\" (1.6 m)   Wt 68 kg (150 lb)   SpO2 96%   BMI 26.57 kg/m²       Temp (24hrs), Av.8 °F (36.6 °C), Min:97.2 °F (36.2 °C), Max:98.1 °F (36.7 °C)        O2 Device: BIPAP O2 Flow Rate (L/min): 6 l/min       Wt Readings from Last 4 Encounters:   21 68 kg (150 lb)          Intake/Output Summary (Last 24 hours) at 2021 1010  Last data filed at 2021 1800  Gross per 24 hour   Intake    Output 2500 ml   Net -2500 ml       Last shift:      No intake/output data recorded. Last 3 shifts:  1901 -  0700  In: -   Out: 2500        Physical Exam:     Physical Exam   Constitutional: She appears distressed. HENT:   Head: Normocephalic and atraumatic. Eyes: Pupils are equal, round, and reactive to light. EOM are normal.   Neck: Normal range of motion. Neck supple. Cardiovascular: Normal rate and regular rhythm. Pulmonary/Chest: She is in respiratory distress. She has rales. Abdominal: Soft. Musculoskeletal: Normal range of motion.    Neurological: She is alert.   Skin: Skin is warm. Labs:    Recent Labs     02/26/21  1245   WBC 2.5*   HGB 9.0*   *     Recent Labs     02/26/21  1430 02/26/21  1354   NA  --  133*   K  --  6.4*   CL  --  96*   CO2  --  28   GLU  --  117*   BUN  --  93*   CREA  --  8.98*   CA  --  7.7*   LAC 1.5  --    ALB  --  3.2*   ALT  --  15     Recent Labs     02/27/21  0400   PH 7.32*   PCO2 40   PO2 73*   HCO3 20*   FIO2 100     Recent Labs     02/26/21  1354   TROIQ <0.05     No results found for: BNPP, BNP   No results found for: CULTNo results found for: TSH, TSHEXT, TSHEXT    Imaging:    CXR Results  (Last 48 hours)               02/27/21 0718  XR CHEST PORT Final result    Impression:  Bilateral central airspace disease/edema right greater than left   showing slight worsening on the right. Narrative:  Portable upright radiograph of the chest at 7:19 AM compared to February 26, 2021. INDICATION: CHF. Right IJ catheter tip projects over the right atrium. Extensive bilateral   central airspace disease, right greater than left shows worsening on the right   and no significant change on the left. Cannot exclude small effusions. No   pneumothorax. 02/26/21 1305  XR CHEST SNGL V Final result    Narrative:  1 new comparison April 16       Central line remains       Moderate severity patchy mixed infiltrate right infrahilar and left midlung. No   effusion or pneumothorax. Normal heart and mediastinum           Results from Hospital Encounter encounter on 02/26/21   XR CHEST PORT    Narrative Portable upright radiograph of the chest at 7:19 AM compared to February 26, 2021. INDICATION: CHF. Right IJ catheter tip projects over the right atrium. Extensive bilateral  central airspace disease, right greater than left shows worsening on the right  and no significant change on the left. Cannot exclude small effusions. No  pneumothorax.       Impression Bilateral central airspace disease/edema right greater than left  showing slight worsening on the right. XR CHEST SNGL V    Narrative 1 new comparison April 16    Central line remains    Moderate severity patchy mixed infiltrate right infrahilar and left midlung. No  effusion or pneumothorax. Normal heart and mediastinum     Results from Hospital Encounter encounter on 02/26/21   CTA CHEST W OR W WO CONT    Narrative CT dose reduction was achieved through use of a standardized protocol tailored  for this examination and automatic exposure control for dose modulation. Contrast study maximum intensity projections    There is a extensive diffuse lung disease. Dense consolidation is seen  throughout much of the right lower lobe and there is mild variable groundglass  opacification and small foci of dense consolidation scattered elsewhere  throughout much of each lung, right greater than left, with subpleural sparing,  mostly on the LEFT. Right middle lobe is disproportionately less involved, as is  the anterior left upper lobe. Central airways are open    Pulmonary arteries are densely opacified and show no filling defect or  distortion. Aorta shows normal dimensions, without dissection. No mediastinal or  hilar adenopathy. Small moderate symmetric pleural effusions. No pericardial  effusion. No significant abdominal finding. Body wall edema      Impression The findings may be a combination of extensive pneumonia,  pneumonitis and edema. No evidence of PE         IMPRESSION:   1. Acute hypoxic respiratory failure  2. History of Goodpasture syndrome  3. Fluid overload pulmonary edema  4. End-stage renal disease on hemodialysis  5. Neutropenia and hyperkalemia  6. Anemia  Body mass index is 26.57 kg/m². 7. Pt is requiring Drug therapy requiring intensive monitoring for toxicity  8.  Pt is unstable, unpredictable needing inpatient monitoring; is acutely ill and at high risk of sudden decline and decompensation with severe consequenses and continued end organ dysfunction and failure  9. Prognosis guarded       RECOMMENDATIONS/PLAN:     1. BIPAP for non invasive ventilatory life support to prevent worsening respiratory acidosis and oxygen as salvage oxygen delivery device to provide high concentration of oxygen to overcome refractory hypoxia; she is to 100% FiO2  2. CAT scan of the chest to rule out any hemorrhage with prior history of Goodpasture syndrome. 3. Agree with dialysis today's labs pending  4. Intubate and place on vent if NIV fails  5. Agree with Empiric IV antibiotics pending culture results we will start her on Zosyn  6. Follow culture results  7. Supplemental O2 to keep sats > 93%  8. Aspiration precautions  9. Labs to follow electrolytes, renal function and and blood counts  10. Glucose monitoring and SSI  11. Bronchial hygiene with respiratory therapy techniques, bronchodilators  12.  DVT, SUP prophylaxis       ·           Liliana Ponce MD

## 2021-02-27 NOTE — PROGRESS NOTES
Patient restless, agitated, removed Bipap,and trying to get out of bed with complains she can't breath and wants to go home. O2 sats down in the 60s. Put back in bed and connected to bipap with O2 sats at 97%.    0300:  Two hours post vistaril PO administered, patient remains restless and agitated and keeps taking Bipap off. Now on a safety 1:1. Nursing supervisor aware.

## 2021-02-27 NOTE — PROGRESS NOTES
Requested by primary RN, Antonella Ford to contact Dr. Nico Mendoza b/c patient is agitated and will not keep bipap on, which is causing her respiratory distress. Dr. Nico Mendoza notified and order was received for 50mg Vistaril q6h PRN for agitation/anxiety.

## 2021-02-27 NOTE — ROUTINE PROCESS
Bedside and Verbal shift change report given to Jock Hodgkin ,RN(oncoming nurse) by Livan Geiger RN(offgoing nurse). Report included the following information LADARIUS, Bruno, MAR, Accordion

## 2021-02-28 ENCOUNTER — APPOINTMENT (OUTPATIENT)
Dept: GENERAL RADIOLOGY | Age: 71
DRG: 208 | End: 2021-02-28
Attending: INTERNAL MEDICINE
Payer: MEDICARE

## 2021-02-28 ENCOUNTER — APPOINTMENT (OUTPATIENT)
Dept: NON INVASIVE DIAGNOSTICS | Age: 71
DRG: 208 | End: 2021-02-28
Attending: INTERNAL MEDICINE
Payer: MEDICARE

## 2021-02-28 LAB
ALBUMIN SERPL-MCNC: 3.2 G/DL (ref 3.5–5)
ALBUMIN/GLOB SERPL: 1.2 {RATIO} (ref 1.1–2.2)
ALP SERPL-CCNC: 71 U/L (ref 45–117)
ALT SERPL-CCNC: 80 U/L (ref 12–78)
ANION GAP SERPL CALC-SCNC: 12 MMOL/L (ref 5–15)
ARTERIAL PATENCY WRIST A: ABNORMAL
AST SERPL W P-5'-P-CCNC: 71 U/L (ref 15–37)
BASE EXCESS BLDA CALC-SCNC: 3.5 MMOL/L (ref 0–2)
BASOPHILS # BLD: 0 K/UL (ref 0–0.1)
BASOPHILS NFR BLD: 0 % (ref 0–1)
BDY SITE: ABNORMAL
BILIRUB SERPL-MCNC: 0.4 MG/DL (ref 0.2–1)
BNP SERPL-MCNC: ABNORMAL PG/ML
BUN SERPL-MCNC: 39 MG/DL (ref 6–20)
BUN/CREAT SERPL: 7 (ref 12–20)
CA-I BLD-MCNC: 8.4 MG/DL (ref 8.5–10.1)
CHLORIDE SERPL-SCNC: 97 MMOL/L (ref 97–108)
CO2 SERPL-SCNC: 27 MMOL/L (ref 21–32)
CREAT SERPL-MCNC: 5.38 MG/DL (ref 0.55–1.02)
DIFFERENTIAL METHOD BLD: ABNORMAL
EOSINOPHIL # BLD: 0 K/UL (ref 0–0.4)
EOSINOPHIL NFR BLD: 0 % (ref 0–7)
EPAP/CPAP/PEEP, PAPEEP: 5
ERYTHROCYTE [DISTWIDTH] IN BLOOD BY AUTOMATED COUNT: 16.5 % (ref 11.5–14.5)
EST. AVERAGE GLUCOSE BLD GHB EST-MCNC: 68 MG/DL
FIO2 ON VENT: 100 %
GAS FLOW.O2 SETTING OXYMISER: 12 L/MIN
GLOBULIN SER CALC-MCNC: 2.7 G/DL (ref 2–4)
GLUCOSE BLD STRIP.AUTO-MCNC: 103 MG/DL (ref 65–100)
GLUCOSE BLD STRIP.AUTO-MCNC: 104 MG/DL (ref 65–100)
GLUCOSE BLD STRIP.AUTO-MCNC: 130 MG/DL (ref 65–100)
GLUCOSE BLD STRIP.AUTO-MCNC: 146 MG/DL (ref 65–100)
GLUCOSE SERPL-MCNC: 125 MG/DL (ref 65–100)
HBA1C MFR BLD: 4 % (ref 4–5.6)
HCO3 BLDA-SCNC: 28 MMOL/L (ref 22–26)
HCT VFR BLD AUTO: 33.2 % (ref 35–47)
HGB BLD-MCNC: 10.6 G/DL (ref 11.5–16)
IMM GRANULOCYTES # BLD AUTO: 0 K/UL (ref 0–0.04)
IMM GRANULOCYTES NFR BLD AUTO: 0 % (ref 0–0.5)
LYMPHOCYTES # BLD: 0.2 K/UL (ref 0.8–3.5)
LYMPHOCYTES NFR BLD: 3 % (ref 12–49)
MCH RBC QN AUTO: 28.6 PG (ref 26–34)
MCHC RBC AUTO-ENTMCNC: 31.9 G/DL (ref 30–36.5)
MCV RBC AUTO: 89.5 FL (ref 80–99)
MONOCYTES # BLD: 0.4 K/UL (ref 0–1)
MONOCYTES NFR BLD: 4 % (ref 5–13)
NEUTS SEG # BLD: 7.8 K/UL (ref 1.8–8)
NEUTS SEG NFR BLD: 93 % (ref 32–75)
PCO2 BLDA: 33 MMHG (ref 35–45)
PERFORMED BY, TECHID: ABNORMAL
PH BLDA: 7.51 [PH] (ref 7.35–7.45)
PHOSPHATE SERPL-MCNC: 5.3 MG/DL (ref 2.6–4.7)
PLATELET # BLD AUTO: 139 K/UL (ref 150–400)
PMV BLD AUTO: 11.7 FL (ref 8.9–12.9)
PO2 BLDA: 395 MMHG (ref 75–100)
POTASSIUM SERPL-SCNC: 4.7 MMOL/L (ref 3.5–5.1)
PROT SERPL-MCNC: 5.9 G/DL (ref 6.4–8.2)
RBC # BLD AUTO: 3.71 M/UL (ref 3.8–5.2)
SAO2 % BLD: 101 %
SAO2% DEVICE SAO2% SENSOR NAME: ABNORMAL
SODIUM SERPL-SCNC: 136 MMOL/L (ref 136–145)
SPECIMEN SITE: ABNORMAL
VENTILATION MODE VENT: ABNORMAL
VT SETTING VENT: 400 ML
WBC # BLD AUTO: 8.4 K/UL (ref 3.6–11)

## 2021-02-28 PROCEDURE — 80053 COMPREHEN METABOLIC PANEL: CPT

## 2021-02-28 PROCEDURE — 77010033678 HC OXYGEN DAILY

## 2021-02-28 PROCEDURE — 85025 COMPLETE CBC W/AUTO DIFF WBC: CPT

## 2021-02-28 PROCEDURE — 82962 GLUCOSE BLOOD TEST: CPT

## 2021-02-28 PROCEDURE — 74011250636 HC RX REV CODE- 250/636: Performed by: FAMILY MEDICINE

## 2021-02-28 PROCEDURE — 84100 ASSAY OF PHOSPHORUS: CPT

## 2021-02-28 PROCEDURE — 94003 VENT MGMT INPAT SUBQ DAY: CPT

## 2021-02-28 PROCEDURE — 82803 BLOOD GASES ANY COMBINATION: CPT

## 2021-02-28 PROCEDURE — 74011250637 HC RX REV CODE- 250/637: Performed by: FAMILY MEDICINE

## 2021-02-28 PROCEDURE — 71045 X-RAY EXAM CHEST 1 VIEW: CPT

## 2021-02-28 PROCEDURE — 93306 TTE W/DOPPLER COMPLETE: CPT

## 2021-02-28 PROCEDURE — 87070 CULTURE OTHR SPECIMN AEROBIC: CPT

## 2021-02-28 PROCEDURE — 74011000258 HC RX REV CODE- 258: Performed by: FAMILY MEDICINE

## 2021-02-28 PROCEDURE — 65610000006 HC RM INTENSIVE CARE

## 2021-02-28 RX ADMIN — PIPERACILLIN AND TAZOBACTAM 3.38 G: 3; .375 INJECTION, POWDER, LYOPHILIZED, FOR SOLUTION INTRAVENOUS at 08:38

## 2021-02-28 RX ADMIN — PROPOFOL 25 MCG/KG/MIN: 10 INJECTION, EMULSION INTRAVENOUS at 16:10

## 2021-02-28 RX ADMIN — METHYLPREDNISOLONE SODIUM SUCCINATE 40 MG: 40 INJECTION, POWDER, FOR SOLUTION INTRAMUSCULAR; INTRAVENOUS at 22:05

## 2021-02-28 RX ADMIN — PANTOPRAZOLE SODIUM 40 MG: 40 TABLET, DELAYED RELEASE ORAL at 08:38

## 2021-02-28 RX ADMIN — PROPOFOL 25 MCG/KG/MIN: 10 INJECTION, EMULSION INTRAVENOUS at 21:00

## 2021-02-28 RX ADMIN — PROPOFOL 30 MCG/KG/MIN: 10 INJECTION, EMULSION INTRAVENOUS at 01:44

## 2021-02-28 RX ADMIN — SEVELAMER CARBONATE 800 MG: 800 TABLET, FILM COATED ORAL at 17:14

## 2021-02-28 RX ADMIN — PROPOFOL 30 MCG/KG/MIN: 10 INJECTION, EMULSION INTRAVENOUS at 06:28

## 2021-02-28 RX ADMIN — SEVELAMER CARBONATE 800 MG: 800 TABLET, FILM COATED ORAL at 08:38

## 2021-02-28 RX ADMIN — PIPERACILLIN AND TAZOBACTAM 3.38 G: 3; .375 INJECTION, POWDER, LYOPHILIZED, FOR SOLUTION INTRAVENOUS at 22:04

## 2021-02-28 RX ADMIN — SEVELAMER CARBONATE 800 MG: 800 TABLET, FILM COATED ORAL at 14:02

## 2021-02-28 RX ADMIN — METHYLPREDNISOLONE SODIUM SUCCINATE 40 MG: 40 INJECTION, POWDER, FOR SOLUTION INTRAMUSCULAR; INTRAVENOUS at 14:03

## 2021-02-28 RX ADMIN — PROPOFOL 50 MCG/KG/MIN: 10 INJECTION, EMULSION INTRAVENOUS at 12:50

## 2021-02-28 RX ADMIN — METHYLPREDNISOLONE SODIUM SUCCINATE 40 MG: 40 INJECTION, POWDER, FOR SOLUTION INTRAMUSCULAR; INTRAVENOUS at 06:25

## 2021-02-28 RX ADMIN — Medication 1 CAPSULE: at 08:38

## 2021-02-28 RX ADMIN — PROPOFOL 45 MCG/KG/MIN: 10 INJECTION, EMULSION INTRAVENOUS at 14:00

## 2021-02-28 NOTE — PROGRESS NOTES
General Daily Progress Note          Patient Name:   Juan Moulton       YOB: 1950       Age:  72 y.o.       Admit Date: 2/26/2021      Subjective:        patient was hypoxic bradycardic yesterday   Seen by the cardiology put on temporary pacemaker  Fluid overload had dialysis yesterday  Patient transferred to ICU patient was hypotensive bradycardic    Today patient on ventilator 50% O2 on dobutamine drip           Objective:     Visit Vitals  /89   Pulse 68   Temp 97.6 °F (36.4 °C)   Resp 20   Ht 5' 3\" (1.6 m)   Wt 46.1 kg (101 lb 10.1 oz)   SpO2 100%   BMI 18.00 kg/m²        Recent Results (from the past 24 hour(s))   BLOOD GAS, ARTERIAL    Collection Time: 02/27/21  1:35 PM   Result Value Ref Range    pH 7.23 (LL) 7.35 - 7.45      PCO2 54 (H) 35 - 45 mmHg    PO2 76 75 - 100 mmHg    O2 SAT 92 (L) >95 %    BICARBONATE 20 (L) 22 - 26 mmol/L    BASE DEFICIT 5.3 (H) 0 - 2 mmol/L    O2 METHOD VENT      FIO2 100.0 %    MODE Assist Control/Volume Control      Tidal volume 400      SET RATE 12      IPAP/PIP 0      EPAP/CPAP/PEEP 5.0      SITE Right Radial      JOVITA'S TEST PASS      Critical value read back [facility] Elisa Wood, RN @1350    GLUCOSE, POC    Collection Time: 02/27/21  4:16 PM   Result Value Ref Range    Glucose (POC) 163 (H) 65 - 100 mg/dL    Performed by Galileo Jurado    MAGNESIUM    Collection Time: 02/27/21  4:45 PM   Result Value Ref Range    Magnesium 2.1 1.6 - 2.4 mg/dL   BNP    Collection Time: 02/27/21  6:15 PM   Result Value Ref Range    NT pro-BNP >35,000 (H) <125 pg/mL   RENAL FUNCTION PANEL    Collection Time: 02/27/21  6:15 PM   Result Value Ref Range    Sodium 138 136 - 145 mmol/L    Potassium 3.6 3.5 - 5.1 mmol/L    Chloride 99 97 - 108 mmol/L    CO2 29 21 - 32 mmol/L    Anion gap 10 5 - 15 mmol/L    Glucose 105 (H) 65 - 100 mg/dL    BUN 21 (H) 6 - 20 mg/dL    Creatinine 2.97 (H) 0.55 - 1.02 mg/dL    BUN/Creatinine ratio 7 (L) 12 - 20      GFR est AA 19 (L) >60 ml/min/1.73m2    GFR est non-AA 16 (L) >60 ml/min/1.73m2    Calcium 8.9 8.5 - 10.1 mg/dL    Phosphorus 2.9 2.6 - 4.7 mg/dL    Albumin 3.9 3.5 - 5.0 g/dL   GLUCOSE, POC    Collection Time: 02/27/21  9:25 PM   Result Value Ref Range    Glucose (POC) 89 65 - 100 mg/dL    Performed by Marisa Cannon    BLOOD GAS, ARTERIAL    Collection Time: 02/28/21  4:25 AM   Result Value Ref Range    pH 7.51 (H) 7.35 - 7.45      PCO2 33 (L) 35 - 45 mmHg    PO2 395 (H) 75 - 100 mmHg    O2  >95 %    BICARBONATE 28 (H) 22 - 26 mmol/L    BASE EXCESS 3.5 (H) 0 - 2 mmol/L    O2 METHOD VENT      FIO2 100.0 %    MODE Assist Control/Volume Control      Tidal volume 400      SET RATE 12      EPAP/CPAP/PEEP 5.0      Sample source Arterial      SITE Right Radial      JOVITA'S TEST PASS     CBC WITH AUTOMATED DIFF    Collection Time: 02/28/21  4:30 AM   Result Value Ref Range    WBC 8.4 3.6 - 11.0 K/uL    RBC 3.71 (L) 3.80 - 5.20 M/uL    HGB 10.6 (L) 11.5 - 16.0 g/dL    HCT 33.2 (L) 35.0 - 47.0 %    MCV 89.5 80.0 - 99.0 FL    MCH 28.6 26.0 - 34.0 PG    MCHC 31.9 30.0 - 36.5 g/dL    RDW 16.5 (H) 11.5 - 14.5 %    PLATELET 288 (L) 094 - 400 K/uL    MPV 11.7 8.9 - 12.9 FL    NEUTROPHILS 93 (H) 32 - 75 %    LYMPHOCYTES 3 (L) 12 - 49 %    MONOCYTES 4 (L) 5 - 13 %    EOSINOPHILS 0 0 - 7 %    BASOPHILS 0 0 - 1 %    IMMATURE GRANULOCYTES 0 0.0 - 0.5 %    ABS. NEUTROPHILS 7.8 1.8 - 8.0 K/UL    ABS. LYMPHOCYTES 0.2 (L) 0.8 - 3.5 K/UL    ABS. MONOCYTES 0.4 0.0 - 1.0 K/UL    ABS. EOSINOPHILS 0.0 0.0 - 0.4 K/UL    ABS. BASOPHILS 0.0 0.0 - 0.1 K/UL    ABS. IMM.  GRANS. 0.0 0.00 - 0.04 K/UL    DF AUTOMATED     METABOLIC PANEL, COMPREHENSIVE    Collection Time: 02/28/21  4:30 AM   Result Value Ref Range    Sodium 136 136 - 145 mmol/L    Potassium 4.7 3.5 - 5.1 mmol/L    Chloride 97 97 - 108 mmol/L    CO2 27 21 - 32 mmol/L    Anion gap 12 5 - 15 mmol/L    Glucose 125 (H) 65 - 100 mg/dL    BUN 39 (H) 6 - 20 mg/dL    Creatinine 5.38 (H) 0.55 - 1.02 mg/dL BUN/Creatinine ratio 7 (L) 12 - 20      GFR est AA 10 (L) >60 ml/min/1.73m2    GFR est non-AA 8 (L) >60 ml/min/1.73m2    Calcium 8.4 (L) 8.5 - 10.1 mg/dL    Bilirubin, total 0.4 0.2 - 1.0 mg/dL    AST (SGOT) 71 (H) 15 - 37 U/L    ALT (SGPT) 80 (H) 12 - 78 U/L    Alk. phosphatase 71 45 - 117 U/L    Protein, total 5.9 (L) 6.4 - 8.2 g/dL    Albumin 3.2 (L) 3.5 - 5.0 g/dL    Globulin 2.7 2.0 - 4.0 g/dL    A-G Ratio 1.2 1.1 - 2.2     PHOSPHORUS    Collection Time: 02/28/21  4:30 AM   Result Value Ref Range    Phosphorus 5.3 (H) 2.6 - 4.7 mg/dL   GLUCOSE, POC    Collection Time: 02/28/21  4:56 AM   Result Value Ref Range    Glucose (POC) 130 (H) 65 - 100 mg/dL    Performed by Ildefonso Roca    GLUCOSE, POC    Collection Time: 02/28/21 10:45 AM   Result Value Ref Range    Glucose (POC) 103 (H) 65 - 100 mg/dL    Performed by Brent Akhtar      [unfilled]      Review of Systems  Unable to obtain. Physical Exam:      Constitutional: Very lethargic and short of breath  HENT:   Head: Normocephalic and atraumatic. Eyes: Pupils are equal, round, and reactive to light. EOM are normal.   Cardiovascular: Normal rate, regular rhythm and normal heart sounds. Pulmonary/Chest: Decreased breath sounds   abdominal: Soft. Bowel sounds are normal. There is no abdominal tenderness. There is no rebound and no guarding. Musculoskeletal: Normal range of motion. Neurological: Lethargic and sleepy    XR CHEST PORT   Final Result   Bilateral airspace disease/edema showing slight improvement compared   to the previous study. XR CHEST PORT   Final Result   Persistent bilateral airspace disease showing slight improvement   compared to earlier study. XR CHEST PORT   Final Result   Bilateral central airspace disease/edema right greater than left   showing slight worsening on the right. CTA CHEST W OR W WO CONT   Final Result   The findings may be a combination of extensive pneumonia,   pneumonitis and edema.  No evidence of PE      XR CHEST SNGL V   Final Result      XR CHEST PORT    (Results Pending)        Recent Results (from the past 24 hour(s))   BLOOD GAS, ARTERIAL    Collection Time: 02/27/21  1:35 PM   Result Value Ref Range    pH 7.23 (LL) 7.35 - 7.45      PCO2 54 (H) 35 - 45 mmHg    PO2 76 75 - 100 mmHg    O2 SAT 92 (L) >95 %    BICARBONATE 20 (L) 22 - 26 mmol/L    BASE DEFICIT 5.3 (H) 0 - 2 mmol/L    O2 METHOD VENT      FIO2 100.0 %    MODE Assist Control/Volume Control      Tidal volume 400      SET RATE 12      IPAP/PIP 0      EPAP/CPAP/PEEP 5.0      SITE Right Radial      JOVITA'S TEST PASS      Critical value read back [facility], RN @1350    GLUCOSE, POC    Collection Time: 02/27/21  4:16 PM   Result Value Ref Range    Glucose (POC) 163 (H) 65 - 100 mg/dL    Performed by Gabo Winston    MAGNESIUM    Collection Time: 02/27/21  4:45 PM   Result Value Ref Range    Magnesium 2.1 1.6 - 2.4 mg/dL   BNP    Collection Time: 02/27/21  6:15 PM   Result Value Ref Range    NT pro-BNP >35,000 (H) <125 pg/mL   RENAL FUNCTION PANEL    Collection Time: 02/27/21  6:15 PM   Result Value Ref Range    Sodium 138 136 - 145 mmol/L    Potassium 3.6 3.5 - 5.1 mmol/L    Chloride 99 97 - 108 mmol/L    CO2 29 21 - 32 mmol/L    Anion gap 10 5 - 15 mmol/L    Glucose 105 (H) 65 - 100 mg/dL    BUN 21 (H) 6 - 20 mg/dL    Creatinine 2.97 (H) 0.55 - 1.02 mg/dL    BUN/Creatinine ratio 7 (L) 12 - 20      GFR est AA 19 (L) >60 ml/min/1.73m2    GFR est non-AA 16 (L) >60 ml/min/1.73m2    Calcium 8.9 8.5 - 10.1 mg/dL    Phosphorus 2.9 2.6 - 4.7 mg/dL    Albumin 3.9 3.5 - 5.0 g/dL   GLUCOSE, POC    Collection Time: 02/27/21  9:25 PM   Result Value Ref Range    Glucose (POC) 89 65 - 100 mg/dL    Performed by Amilcar Gemma    BLOOD GAS, ARTERIAL    Collection Time: 02/28/21  4:25 AM   Result Value Ref Range    pH 7.51 (H) 7.35 - 7.45      PCO2 33 (L) 35 - 45 mmHg    PO2 395 (H) 75 - 100 mmHg    O2  >95 %    BICARBONATE 28 (H) 22 - 26 mmol/L BASE EXCESS 3.5 (H) 0 - 2 mmol/L    O2 METHOD VENT      FIO2 100.0 %    MODE Assist Control/Volume Control      Tidal volume 400      SET RATE 12      EPAP/CPAP/PEEP 5.0      Sample source Arterial      SITE Right Radial      JOVITA'S TEST PASS     CBC WITH AUTOMATED DIFF    Collection Time: 02/28/21  4:30 AM   Result Value Ref Range    WBC 8.4 3.6 - 11.0 K/uL    RBC 3.71 (L) 3.80 - 5.20 M/uL    HGB 10.6 (L) 11.5 - 16.0 g/dL    HCT 33.2 (L) 35.0 - 47.0 %    MCV 89.5 80.0 - 99.0 FL    MCH 28.6 26.0 - 34.0 PG    MCHC 31.9 30.0 - 36.5 g/dL    RDW 16.5 (H) 11.5 - 14.5 %    PLATELET 688 (L) 182 - 400 K/uL    MPV 11.7 8.9 - 12.9 FL    NEUTROPHILS 93 (H) 32 - 75 %    LYMPHOCYTES 3 (L) 12 - 49 %    MONOCYTES 4 (L) 5 - 13 %    EOSINOPHILS 0 0 - 7 %    BASOPHILS 0 0 - 1 %    IMMATURE GRANULOCYTES 0 0.0 - 0.5 %    ABS. NEUTROPHILS 7.8 1.8 - 8.0 K/UL    ABS. LYMPHOCYTES 0.2 (L) 0.8 - 3.5 K/UL    ABS. MONOCYTES 0.4 0.0 - 1.0 K/UL    ABS. EOSINOPHILS 0.0 0.0 - 0.4 K/UL    ABS. BASOPHILS 0.0 0.0 - 0.1 K/UL    ABS. IMM. GRANS. 0.0 0.00 - 0.04 K/UL    DF AUTOMATED     METABOLIC PANEL, COMPREHENSIVE    Collection Time: 02/28/21  4:30 AM   Result Value Ref Range    Sodium 136 136 - 145 mmol/L    Potassium 4.7 3.5 - 5.1 mmol/L    Chloride 97 97 - 108 mmol/L    CO2 27 21 - 32 mmol/L    Anion gap 12 5 - 15 mmol/L    Glucose 125 (H) 65 - 100 mg/dL    BUN 39 (H) 6 - 20 mg/dL    Creatinine 5.38 (H) 0.55 - 1.02 mg/dL    BUN/Creatinine ratio 7 (L) 12 - 20      GFR est AA 10 (L) >60 ml/min/1.73m2    GFR est non-AA 8 (L) >60 ml/min/1.73m2    Calcium 8.4 (L) 8.5 - 10.1 mg/dL    Bilirubin, total 0.4 0.2 - 1.0 mg/dL    AST (SGOT) 71 (H) 15 - 37 U/L    ALT (SGPT) 80 (H) 12 - 78 U/L    Alk.  phosphatase 71 45 - 117 U/L    Protein, total 5.9 (L) 6.4 - 8.2 g/dL    Albumin 3.2 (L) 3.5 - 5.0 g/dL    Globulin 2.7 2.0 - 4.0 g/dL    A-G Ratio 1.2 1.1 - 2.2     PHOSPHORUS    Collection Time: 02/28/21  4:30 AM   Result Value Ref Range    Phosphorus 5.3 (H) 2.6 - 4.7 mg/dL   GLUCOSE, POC    Collection Time: 02/28/21  4:56 AM   Result Value Ref Range    Glucose (POC) 130 (H) 65 - 100 mg/dL    Performed by Marielena Boogie, POC    Collection Time: 02/28/21 10:45 AM   Result Value Ref Range    Glucose (POC) 103 (H) 65 - 100 mg/dL    Performed by Ady Archer        Results     Procedure Component Value Units Date/Time    CULTURE, RESPIRATORY/SPUTUM/BRONCH Marta Cordial STAIN [[id number]]     Order Status: Sent Specimen: Sputum     CULTURE, BLOOD #1 [[id number]] Collected: 02/27/21 0158    Order Status: Completed Specimen: Blood Updated: 02/27/21 0312    CULTURE, BLOOD #2 [[id number]] Collected: 02/27/21 0145    Order Status: Completed Specimen: Blood Updated: 02/27/21 0313    CULTURE, BLOOD, PAIRED [[id number]] Collected: 02/26/21 1430    Order Status: Completed Specimen: Blood Updated: 02/28/21 1042     Special Requests: No Special Requests        Culture result: No growth after 22 hours       CULTURE, BLOOD [[id number]] Collected: 02/26/21 1430    Order Status: Canceled Specimen: Blood     COVID-19 RAPID TEST [[id number]] Collected: 02/26/21 1336    Order Status: Completed Specimen: Nasopharyngeal Updated: 02/26/21 1409     Specimen source Nasopharyngeal        COVID-19 rapid test Not Detected        Comment: Rapid Abbott ID Now   Rapid NAAT:  The specimen is NEGATIVE for SARS-CoV-2, the novel coronavirus associated with COVID-19. Negative results should be treated as presumptive and, if inconsistent with clinical signs and symptoms or necessary for patient management, should be tested with an alternative molecular assay. Negative results do not preclude SARS-CoV-2 infection and should not be used as the sole basis for patient management decisions. This test has been authorized by the FDA under   an Emergency Use Authorization (EUA) for use by authorized laboratories.  Fact sheet for Healthcare Providers: [employer] Fact sheet for Patients: [facility]   Methodology: Isothermal Nucleic Acid Amplification                Labs:     Recent Labs     02/28/21  0430 02/27/21  1300   WBC 8.4 8.1   HGB 10.6* 13.5   HCT 33.2* 42.2   * 156     Recent Labs     02/28/21  0430 02/27/21  1815 02/27/21  1645 02/27/21  1300    138  --  132*   K 4.7 3.6  --  6.2*   CL 97 99  --  99   CO2 27 29  --  22   BUN 39* 21*  --  61*   CREA 5.38* 2.97*  --  6.88*   * 105*  --  247*   CA 8.4* 8.9  --  7.9*   MG  --   --  2.1  --    PHOS 5.3* 2.9  --   --      Recent Labs     02/28/21  0430 02/27/21  1815 02/27/21  1300 02/26/21  1354   ALT 80*  --  113* 15   AP 71  --  97 99   TBILI 0.4  --  0.6 0.4   TP 5.9*  --  6.9 6.0*   ALB 3.2* 3.9 3.5 3.2*   GLOB 2.7  --  3.4 2.8     No results for input(s): INR, PTP, APTT, INREXT, INREXT in the last 72 hours. No results for input(s): FE, TIBC, PSAT, FERR in the last 72 hours.    No results found for: FOL, RBCF   Recent Labs     02/28/21  0425 02/27/21  1335   PH 7.51* 7.23*   PCO2 33* 54*   PO2 395* 76     Recent Labs     02/26/21  1354   TROIQ <0.05     No results found for: CHOL, CHOLX, CHLST, CHOLV, HDL, HDLP, LDL, LDLC, DLDLP, TGLX, TRIGL, TRIGP, CHHD, CHHDX  Lab Results   Component Value Date/Time    Glucose (POC) 103 (H) 02/28/2021 10:45 AM    Glucose (POC) 130 (H) 02/28/2021 04:56 AM    Glucose (POC) 89 02/27/2021 09:25 PM    Glucose (POC) 163 (H) 02/27/2021 04:16 PM    Glucose (POC) 214 (H) 02/27/2021 10:53 AM     No results found for: COLOR, APPRN, SPGRU, REFSG, YESY, PROTU, GLUCU, KETU, BILU, UROU, CARTER, LEUKU, GLUKE, EPSU, BACTU, WBCU, RBCU, CASTS, UCRY      Assessment:     Acute hypoxemic respiratory failure on ventilator 50% O2   severe symptomatic bradycardia on Toprol urinary sphincter  End-stage renal disease on hemodialysis at home  Hypotensive  Goodpasture's syndrome  Fluid overload  Hyperkalemia  Anemia of chronic disease      Plan:   On IV Solu-Medrol 40 mg IV every 8 hours  IV Protonix 40 mg daily  Cardiology consult for permanent pacemaker  Continue dobutamine and Levophed  On vancomycin and IV Zosyn    Follow-up with nephrology cardiology and pulmonologist    Overall prognosis very poor      Discussed with the cardiology and the nephrology    I spent critical care time 30 minutes independently        Current Facility-Administered Medications:     hydrOXYzine pamoate (VISTARIL) capsule 50 mg, 50 mg, Oral, Q6H PRN, Es El MD, 50 mg at 02/27/21 0528    [START ON 3/2/2021] VANCOMYCIN INFORMATION NOTE, , Other, ONCE, Kim George MD    VANCOMYCIN INFORMATION NOTE 1 Each, 1 Each, Other, Rx Dosing/Monitoring, Es El MD    DOBUTamine (DOBUTREX) 500 mg/250 mL (2,000 mcg/mL) infusion, 5 mcg/kg/min, IntraVENous, CONTINUOUS, Betsy ISMS MD, Stopped at 02/28/21 1307    NOREPINephrine (LEVOPHED) 16,000 mcg in dextrose 5% 250 mL infusion, 2-16 mcg/min, IntraVENous, TITRATE, Nathen George MD    insulin lispro (HUMALOG) injection, , SubCUTAneous, AC&HS, Kim George MD, Stopped at 02/27/21 2200    glucose chewable tablet 16 g, 4 Tab, Oral, PRN, Kim George MD    dextrose (D50W) injection syrg 12.5-25 g, 25-50 mL, IntraVENous, PRN, Nathen George MD    glucagon (GLUCAGEN) injection 1 mg, 1 mg, IntraMUSCular, PRN, Es El MD    [Held by provider] azaTHIOprine (IMURAN) tablet 25 mg, 25 mg, Oral, DAILY, Roxann Ramos MD, Stopped at 02/28/21 0900    propofol (DIPRIVAN) 10 mg/mL infusion, 0-50 mcg/kg/min, IntraVENous, TITRATE, Kim George MD, Last Rate: 20.4 mL/hr at 02/28/21 1250, 50 mcg/kg/min at 02/28/21 1250    metoprolol (LOPRESSOR) injection 5 mg, 5 mg, IntraVENous, Q6H PRN, Aurelia Jett MD, 5 mg at 02/27/21 1646    methylPREDNISolone (PF) (SOLU-MEDROL) injection 40 mg, 40 mg, IntraVENous, Q8H, Kim George MD, 40 mg at 02/28/21 6165    vitamin B complex capsule 1 Cap, 1 Cap, Oral, DAILY, Trevon Wyman MD, 1 Cap at 02/28/21 5240    pantoprazole (PROTONIX) tablet 40 mg, 40 mg, Oral, DAILY, Yamilet George MD, 40 mg at 02/28/21 4132    sevelamer carbonate (RENVELA) tab 800 mg, 800 mg, Oral, TID WITH MEALS, Yamilet George MD, 800 mg at 02/28/21 7461    acetaminophen (TYLENOL) tablet 650 mg, 650 mg, Oral, Q6H PRN **OR** acetaminophen (TYLENOL) suppository 650 mg, 650 mg, Rectal, Q6H PRN, Yamilet George MD    polyethylene glycol (MIRALAX) packet 17 g, 17 g, Oral, DAILY PRN, Yamilet George MD    ondansetron (ZOFRAN ODT) tablet 4 mg, 4 mg, Oral, Q8H PRN **OR** ondansetron (ZOFRAN) injection 4 mg, 4 mg, IntraVENous, Q6H PRN, Kim George MD    piperacillin-tazobactam (ZOSYN) 3.375 g in 0.9% sodium chloride (MBP/ADV) 100 mL MBP, 3.375 g, IntraVENous, Q12H, Kim George MD, Last Rate: 200 mL/hr at 02/28/21 0838, 3.375 g at 02/28/21 1840

## 2021-02-28 NOTE — CONSULTS
PULMONARY ICU NOTE  [facility]    Name: Wanda Borrero MRN: [id number]   : 1950 Hospital: [facility]   Date: 2021  Admission date: 2021 Hospital Day: 3       HPI:     Hospital Problems  Never Reviewed          Codes Class Noted POA    PNA (pneumonia) ICD-10-CM: J18.9  ICD-9-CM: 486  2021 Unknown        SOB (shortness of breath) ICD-10-CM: R06.02  ICD-9-CM: 786.05  2021 Unknown                   [x] High complexity decision making was performed  [x] See my orders for details      Subjective/Initial History:     I was asked by Feliciano Esteves MD to see Wanda Borrero  a 72 y.o. African American female in consultation     Excerpts from admission 2021 or consult notes as follows:   71-year-old lady came in because of shortness of breath and dyspnea significant past medical history of end-stage renal disease on hemodialysis, Goodpasture syndrome anemia of chronic disease hypertension steroid-dependent, she was not able to dialysis at home because of generalized weakness and shortness of breath she has been on home dialysis no fever no chills she was put on 100% nonrebreather mask which has been switched to noninvasive ventilator not she is going for dialysis because of severe hypoxia so pulmonary critical care consult was called for further evaluation. She is a lifetime non-smoker.       No Known Allergies     MAR reviewed and pertinent medications noted or modified as needed     Current Facility-Administered Medications   Medication    hydrOXYzine pamoate (VISTARIL) capsule 50 mg    [START ON 3/2/2021] VANCOMYCIN INFORMATION NOTE    VANCOMYCIN INFORMATION NOTE 1 Each    DOBUTamine (DOBUTREX) 500 mg/250 mL (2,000 mcg/mL) infusion    NOREPINephrine (LEVOPHED) 16,000 mcg in dextrose 5% 250 mL infusion    insulin lispro (HUMALOG) injection    glucose chewable tablet 16 g    dextrose (D50W) injection syrg 12.5-25 g    glucagon (GLUCAGEN) injection 1 mg    [Held by provider] azaTHIOprine (IMURAN) tablet 25 mg    propofol (DIPRIVAN) 10 mg/mL infusion    metoprolol (LOPRESSOR) injection 5 mg    methylPREDNISolone (PF) (SOLU-MEDROL) injection 40 mg    vitamin B complex capsule 1 Cap    pantoprazole (PROTONIX) tablet 40 mg    sevelamer carbonate (RENVELA) tab 800 mg    acetaminophen (TYLENOL) tablet 650 mg    Or    acetaminophen (TYLENOL) suppository 650 mg    polyethylene glycol (MIRALAX) packet 17 g    ondansetron (ZOFRAN ODT) tablet 4 mg    Or    ondansetron (ZOFRAN) injection 4 mg    piperacillin-tazobactam (ZOSYN) 3.375 g in 0.9% sodium chloride (MBP/ADV) 100 mL MBP      Patient PCP: None  PMH:  has a past medical history of Chronic kidney disease and Heart failure ([facility] [address] PSH:   has no past surgical history on file. FHX: family history is not on file. SHX:  reports that she has never smoked. She has never used smokeless tobacco. She reports previous alcohol use. She reports previous drug use. ROS:  Unable to obtain as patient was lethargic and severely short of breath      Objective:     Vital Signs: Telemetry:    normal sinus rhythm Intake/Output:   Visit Vitals  /89   Pulse 75   Temp 97.8 °F (36.6 °C)   Resp 20   Ht 5' 3\" (1.6 m)   Wt 46.1 kg (101 lb 10.1 oz)   SpO2 100%   BMI 18.00 kg/m²       Temp (24hrs), Av.5 °F (36.4 °C), Min:97.2 °F (36.2 °C), Max:97.8 °F (36.6 °C)        O2 Device: Ventilator O2 Flow Rate (L/min): 6 l/min       Wt Readings from Last 4 Encounters:   21 46.1 kg (101 lb 10.1 oz)          Intake/Output Summary (Last 24 hours) at 2021 0909  Last data filed at 2021 1828  Gross per 24 hour   Intake    Output 2000 ml   Net -2000 ml       Last shift:      No intake/output data recorded. Last 3 shifts:  1901 -  0700  In: -   Out:         Physical Exam:   Patient is intubated on ventilator  Physical Exam   Constitutional: She appears distressed.    HENT:   Head: Normocephalic and atraumatic. Eyes: Pupils are equal, round, and reactive to light. EOM are normal.   Neck: Normal range of motion. Neck supple. Cardiovascular: Normal rate and regular rhythm. Pulmonary/Chest: She is in respiratory distress. She has rales. Abdominal: Soft. Musculoskeletal: Normal range of motion. Neurological: She is alert. Skin: Skin is warm. Labs:    Recent Labs     02/28/21  0430 02/27/21  1300 02/26/21  1245   WBC 8.4 8.1 2.5*   HGB 10.6* 13.5 9.0*   * 156 145*     Recent Labs     02/28/21  0430 02/27/21  1815 02/27/21  1645 02/27/21  1300 02/26/21  1430 02/26/21  1354    138  --  132*  --  133*   K 4.7 3.6  --  6.2*  --  6.4*   CL 97 99  --  99  --  96*   CO2 27 29  --  22  --  28   * 105*  --  247*  --  117*   BUN 39* 21*  --  61*  --  93*   CREA 5.38* 2.97*  --  6.88*  --  8.98*   CA 8.4* 8.9  --  7.9*  --  7.7*   MG  --   --  2.1  --   --   --    PHOS 5.3* 2.9  --   --   --   --    LAC  --   --   --  3.3* 1.5  --    ALB 3.2* 3.9  --  3.5  --  3.2*   ALT 80*  --   --  113*  --  15     Recent Labs     02/28/21  0425 02/27/21  1335 02/27/21  1050   PH 7.51* 7.23* 7.40   PCO2 33* 54* 31*   PO2 395* 76 63*   HCO3 28* 20* 21*   FIO2 100.0 100.0 100.0     Recent Labs     02/26/21  1354   TROIQ <0.05     No results found for: BNPP, BNP   No results found for: CULTNo results found for: TSH, TSHEXT, TSHEXT    Imaging:    CXR Results  (Last 48 hours)               02/28/21 0320  XR CHEST PORT Final result    Impression:  Bilateral airspace disease/edema showing slight improvement compared   to the previous study. Narrative:  Portable upright radiograph chest 6:54 AM compared to February 27, 2021. INDICATION: CHF. Heart size is within normal limits. Right IJ catheter tips project over the   right atrium. Nasogastric tube projects over the stomach. Diffuse bilateral airspace disease or edema shows some improvement compared to   previous study.  Endotracheal tube tip is 6 cm above the geeta. No pneumothorax. Skinfold projects over the left upper lobe. 02/27/21 1119  XR CHEST PORT Final result    Impression:  Persistent bilateral airspace disease showing slight improvement   compared to earlier study. Narrative:  Upright radiograph chest 11:12 AM compared with 3/27/2021 at 7:19 AM.       INDICATION: Tachypnea, pneumonia. Right IJ central line remains in place. Heart size is stable. Extensive   bilateral airspace disease, right greater than left, in a predominantly central   distribution shows minimal improvement compared to the previous study. Defibrillator pads project over the right upper chest and left lower chest. No   pneumothorax. Definite displaced fractures. Next           02/27/21 0718  XR CHEST PORT Final result    Impression:  Bilateral central airspace disease/edema right greater than left   showing slight worsening on the right. Narrative:  Portable upright radiograph of the chest at 7:19 AM compared to February 26, 2021. INDICATION: CHF. Right IJ catheter tip projects over the right atrium. Extensive bilateral   central airspace disease, right greater than left shows worsening on the right   and no significant change on the left. Cannot exclude small effusions. No   pneumothorax. 02/26/21 1305  XR CHEST SNGL V Final result    Narrative:  1 new comparison April 16       Central line remains       Moderate severity patchy mixed infiltrate right infrahilar and left midlung. No   effusion or pneumothorax. Normal heart and mediastinum           Results from Hospital Encounter encounter on 02/26/21   XR CHEST PORT    Narrative Upright radiograph chest 11:12 AM compared with 3/27/2021 at 7:19 AM.    INDICATION: Tachypnea, pneumonia. Right IJ central line remains in place. Heart size is stable.  Extensive  bilateral airspace disease, right greater than left, in a predominantly central  distribution shows minimal improvement compared to the previous study. Defibrillator pads project over the right upper chest and left lower chest. No  pneumothorax. Definite displaced fractures. Next      Impression Persistent bilateral airspace disease showing slight improvement  compared to earlier study. XR CHEST PORT    Narrative Portable upright radiograph chest 6:54 AM compared to February 27, 2021. INDICATION: CHF. Heart size is within normal limits. Right IJ catheter tips project over the  right atrium. Nasogastric tube projects over the stomach. Diffuse bilateral airspace disease or edema shows some improvement compared to  previous study. Endotracheal tube tip is 6 cm above the geeta. No pneumothorax. Skinfold projects over the left upper lobe. Impression Bilateral airspace disease/edema showing slight improvement compared  to the previous study. XR CHEST PORT    Narrative Portable upright radiograph of the chest at 7:19 AM compared to February 26, 2021. INDICATION: CHF. Right IJ catheter tip projects over the right atrium. Extensive bilateral  central airspace disease, right greater than left shows worsening on the right  and no significant change on the left. Cannot exclude small effusions. No  pneumothorax. Impression Bilateral central airspace disease/edema right greater than left  showing slight worsening on the right. Results from [place] encounter on 02/26/21   CTA CHEST W OR W WO CONT    Narrative CT dose reduction was achieved through use of a standardized protocol tailored  for this examination and automatic exposure control for dose modulation. Contrast study maximum intensity projections    There is a extensive diffuse lung disease.  Dense consolidation is seen  throughout much of the right lower lobe and there is mild variable groundglass  opacification and small foci of dense consolidation scattered elsewhere  throughout much of each lung, right greater than left, with subpleural sparing,  mostly on the LEFT. Right middle lobe is disproportionately less involved, as is  the anterior left upper lobe. Central airways are open    Pulmonary arteries are densely opacified and show no filling defect or  distortion. Aorta shows normal dimensions, without dissection. No mediastinal or  hilar adenopathy. Small moderate symmetric pleural effusions. No pericardial  effusion. No significant abdominal finding. Body wall edema      Impression The findings may be a combination of extensive pneumonia,  pneumonitis and edema. No evidence of PE         IMPRESSION:   1. Acute hypoxic respiratory failure  2. History of Goodpasture syndrome  3. Fluid overload pulmonary edema  4. End-stage renal disease on hemodialysis  5. Neutropenia and hyperkalemia  6. Anemia  7. Bradycardia  Body mass index is 18 kg/m². 8. Pt is requiring Drug therapy requiring intensive monitoring for toxicity  9. Pt is unstable, unpredictable needing inpatient monitoring; is acutely ill and at high risk of sudden decline and decompensation with severe consequenses and continued end organ dysfunction and failure  10. Prognosis guarded       RECOMMENDATIONS/PLAN:     1. Patient is intubated on ventilator assist control mode will decrease FiO2 and will start weaning when more hemodynamically stable on 50% FiO2 Will start weaning  2. CAT scan of the chest shows pulmonary edema CHF with prior history of Goodpasture syndrome. 3. Agree with dialysis today's labs pending  4. Intubate and place on vent if NIV fails  5. Agree with Empiric IV antibiotics pending culture results she is Zosyn  6. Follow culture results   7. Patient is afebrile normal white count  8. 2D echo pending off aminophylline and dobutamine  9. Supplemental O2 to keep sats > 93%  10. Aspiration precautions  11. Labs to follow electrolytes, renal function and and blood counts  12. Glucose monitoring and SSI  13.  Bronchial hygiene with respiratory therapy techniques, bronchodilators  14.  DVT, SUP prophylaxis       ·           Ramez Mead MD

## 2021-02-28 NOTE — CONSULTS
Consult Date: 2/28/2021    Renal follow-up    Subjective Beti Robert of presenting illness  Ms. Tyrone Reyna a 42-year-old  female with a history of ESRD on hemodialysis, Goodpasture's syndrome, anemia of chronic disease, hypertension, steroid dependency, who came to the hospital for increasing generalized weakness, inability to do dialysis at home because of weakness and worsening dyspnea. She has been on home hemodialysis despite my recommendation to switch to IHD  Patient has been having difficulty to have better volume control and hypertension control at home  I strongly suggested patient to be switched back to IHD for more fluid removal during dialysis as  was not able to remove fluid as he was  concerned about hypotension and her getting passed out. Pt Continue to keep up with [facility] for Goodpasture's syndrome   Today patient received dialysis for 2 hours and only 2.5 L of fluid was removed  Morning patient became tachypneic, and into respiratory failure hence [facility] called patient was resuscitated. after receiving dialysis last night,  Patient seems to be doing well   she is well oxygenated  and ready to be extubated      Past Medical History:   Diagnosis Date    Chronic kidney disease     Heart failure ([address]       History reviewed. No pertinent surgical history. History reviewed. No pertinent family history.    Social History     Tobacco Use    Smoking status: Never Smoker    Smokeless tobacco: Never Used   Substance Use Topics    Alcohol use: Not Currently       Current Facility-Administered Medications   Medication Dose Route Frequency Provider Last Rate Last Admin    hydrOXYzine pamoate (VISTARIL) capsule 50 mg  50 mg Oral Q6H PRN Kim George MD   50 mg at 02/27/21 0528    [START ON 3/2/2021] VANCOMYCIN INFORMATION NOTE   Other ONCE Kevyn George MD        VANCOMYCIN INFORMATION NOTE 1 Each  1 Each Other Rx Dosing/Monitoring Murphy Duarte MD  DOBUTamine (DOBUTREX) 500 mg/250 mL (2,000 mcg/mL) infusion  5 mcg/kg/min IntraVENous CONTINUOUS Colten Garcia MD   Stopped at 02/28/21 1307    NOREPINephrine (LEVOPHED) 16,000 mcg in dextrose 5% 250 mL infusion  2-16 mcg/min IntraVENous TITRATE Leonard George MD Jannell Outhouse insulin lispro (HUMALOG) injection   SubCUTAneous AC&HS Brad Galicia MD   Stopped at 02/27/21 2200    glucose chewable tablet 16 g  4 Tab Oral PRN Leonard George MD        dextrose (D50W) injection syrg 12.5-25 g  25-50 mL IntraVENous PRN Leonard George MD        glucagon (GLUCAGEN) injection 1 mg  1 mg IntraMUSCular PRN MD Alberto Beard [Held by provider] azaTHIOprine (IMURAN) tablet 25 mg  25 mg Oral DAILY Mavis Silver MD   Stopped at 02/28/21 0900    propofol (DIPRIVAN) 10 mg/mL infusion  0-50 mcg/kg/min IntraVENous TITRATE Kim George MD 10.2 mL/hr at 02/28/21 1610 25 mcg/kg/min at 02/28/21 1610    metoprolol (LOPRESSOR) injection 5 mg  5 mg IntraVENous Q6H PRN Yousif Le MD   5 mg at 02/27/21 1646    methylPREDNISolone (PF) (SOLU-MEDROL) injection 40 mg  40 mg IntraVENous Q8H Kim George MD   40 mg at 02/28/21 1403    vitamin B complex capsule 1 Cap  1 Cap Oral DAILY Kim George MD   1 Cap at 02/28/21 7758    pantoprazole (PROTONIX) tablet 40 mg  40 mg Oral DAILY Kim George MD   40 mg at 02/28/21 7128    sevelamer carbonate (RENVELA) tab 800 mg  800 mg Oral TID WITH MEALS Leonard George MD   800 mg at 02/28/21 1714    acetaminophen (TYLENOL) tablet 650 mg  650 mg Oral Q6H PRN Leonard George MD        Or   Jannell Outhouse acetaminophen (TYLENOL) suppository 650 mg  650 mg Rectal Q6H PRN Leonard George MD        polyethylene glycol (MIRALAX) packet 17 g  17 g Oral DAILY PRN Leonard George MD        ondansetron (ZOFRAN ODT) tablet 4 mg  4 mg Oral Q8H PRN Kim George MD        Or    ondansetron [facility]) injection 4 mg  4 mg IntraVENous Q6H PRN Horacio Myers MD        piperacillin-tazobactam (ZOSYN) 3.375 g in 0.9% sodium chloride (MBP/ADV) 100 mL MBP  3.375 g IntraVENous Q12H Kim George  mL/hr at 02/28/21 0838 3.375 g at 02/28/21 8534       Review of systems cannot be performed as patient is intubated      Objective     Vital signs for last 24 hours:  Visit Vitals  BP (!) 143/93   Pulse 74   Temp 97.8 °F (36.6 °C)   Resp 19   Ht 5' 3\" (1.6 m)   Wt 46.1 kg (101 lb 10.1 oz)   SpO2 100%   BMI 18.00 kg/m²       Intake/Output this shift:  Current Shift: 02/28 0701 - 02/28 1900  In: 20   Out: -   Last 3 Shifts: 02/26 1901 - 02/28 0700  In: -   Out: 2000   Physical Exam   Constitutional: She is sedated and intubated. HENT:   Head: Normocephalic and atraumatic. Neck: Normal range of motion. Neck supple. No JVD present. Cardiovascular: Normal heart sounds. No murmur heard. Pulmonary/Chest: Effort normal. She is intubated. Abdominal: Soft. Bowel sounds are normal.   Musculoskeletal:         General: No deformity or edema.    Data Review:   Recent Results (from the past 24 hour(s))   BNP    Collection Time: 02/27/21  6:15 PM   Result Value Ref Range    NT pro-BNP >35,000 (H) <125 pg/mL   RENAL FUNCTION PANEL    Collection Time: 02/27/21  6:15 PM   Result Value Ref Range    Sodium 138 136 - 145 mmol/L    Potassium 3.6 3.5 - 5.1 mmol/L    Chloride 99 97 - 108 mmol/L    CO2 29 21 - 32 mmol/L    Anion gap 10 5 - 15 mmol/L    Glucose 105 (H) 65 - 100 mg/dL    BUN 21 (H) 6 - 20 mg/dL    Creatinine 2.97 (H) 0.55 - 1.02 mg/dL    BUN/Creatinine ratio 7 (L) 12 - 20      GFR est AA 19 (L) >60 ml/min/1.73m2    GFR est non-AA 16 (L) >60 ml/min/1.73m2    Calcium 8.9 8.5 - 10.1 mg/dL    Phosphorus 2.9 2.6 - 4.7 mg/dL    Albumin 3.9 3.5 - 5.0 g/dL   GLUCOSE, POC    Collection Time: 02/27/21  9:25 PM   Result Value Ref Range    Glucose (POC) 89 65 - 100 mg/dL    Performed by Tana Manzano    BLOOD GAS, ARTERIAL    Collection Time: 02/28/21  4:25 AM Result Value Ref Range    pH 7.51 (H) 7.35 - 7.45      PCO2 33 (L) 35 - 45 mmHg    PO2 395 (H) 75 - 100 mmHg    O2  >95 %    BICARBONATE 28 (H) 22 - 26 mmol/L    BASE EXCESS 3.5 (H) 0 - 2 mmol/L    O2 METHOD VENT      FIO2 100.0 %    MODE Assist Control/Volume Control      Tidal volume 400      SET RATE 12      EPAP/CPAP/PEEP 5.0      Sample source Arterial      SITE Right Radial      JOVITA'S TEST PASS     CBC WITH AUTOMATED DIFF    Collection Time: 02/28/21  4:30 AM   Result Value Ref Range    WBC 8.4 3.6 - 11.0 K/uL    RBC 3.71 (L) 3.80 - 5.20 M/uL    HGB 10.6 (L) 11.5 - 16.0 g/dL    HCT 33.2 (L) 35.0 - 47.0 %    MCV 89.5 80.0 - 99.0 FL    MCH 28.6 26.0 - 34.0 PG    MCHC 31.9 30.0 - 36.5 g/dL    RDW 16.5 (H) 11.5 - 14.5 %    PLATELET 810 (L) 166 - 400 K/uL    MPV 11.7 8.9 - 12.9 FL    NEUTROPHILS 93 (H) 32 - 75 %    LYMPHOCYTES 3 (L) 12 - 49 %    MONOCYTES 4 (L) 5 - 13 %    EOSINOPHILS 0 0 - 7 %    BASOPHILS 0 0 - 1 %    IMMATURE GRANULOCYTES 0 0.0 - 0.5 %    ABS. NEUTROPHILS 7.8 1.8 - 8.0 K/UL    ABS. LYMPHOCYTES 0.2 (L) 0.8 - 3.5 K/UL    ABS. MONOCYTES 0.4 0.0 - 1.0 K/UL    ABS. EOSINOPHILS 0.0 0.0 - 0.4 K/UL    ABS. BASOPHILS 0.0 0.0 - 0.1 K/UL    ABS. IMM. GRANS. 0.0 0.00 - 0.04 K/UL    DF AUTOMATED     METABOLIC PANEL, COMPREHENSIVE    Collection Time: 02/28/21  4:30 AM   Result Value Ref Range    Sodium 136 136 - 145 mmol/L    Potassium 4.7 3.5 - 5.1 mmol/L    Chloride 97 97 - 108 mmol/L    CO2 27 21 - 32 mmol/L    Anion gap 12 5 - 15 mmol/L    Glucose 125 (H) 65 - 100 mg/dL    BUN 39 (H) 6 - 20 mg/dL    Creatinine 5.38 (H) 0.55 - 1.02 mg/dL    BUN/Creatinine ratio 7 (L) 12 - 20      GFR est AA 10 (L) >60 ml/min/1.73m2    GFR est non-AA 8 (L) >60 ml/min/1.73m2    Calcium 8.4 (L) 8.5 - 10.1 mg/dL    Bilirubin, total 0.4 0.2 - 1.0 mg/dL    AST (SGOT) 71 (H) 15 - 37 U/L    ALT (SGPT) 80 (H) 12 - 78 U/L    Alk.  phosphatase 71 45 - 117 U/L    Protein, total 5.9 (L) 6.4 - 8.2 g/dL    Albumin 3.2 (L) 3.5 - 5.0 g/dL    Globulin 2.7 2.0 - 4.0 g/dL    A-G Ratio 1.2 1.1 - 2.2     PHOSPHORUS    Collection Time: 02/28/21  4:30 AM   Result Value Ref Range    Phosphorus 5.3 (H) 2.6 - 4.7 mg/dL   GLUCOSE, POC    Collection Time: 02/28/21  4:56 AM   Result Value Ref Range    Glucose (POC) 130 (H) 65 - 100 mg/dL    Performed by Tresa Tucker    GLUCOSE, POC    Collection Time: 02/28/21 10:45 AM   Result Value Ref Range    Glucose (POC) 103 (H) 65 - 100 mg/dL    Performed by Saurabh Gan    ECHO ADULT COMPLETE    Collection Time: 02/28/21  2:15 PM   Result Value Ref Range    Aortic Regurgitant Pressure Half-time 1,593.00 ms    AR Max Luan 239.00 cm/s    Pulmonic Regurgitant End Max Velocity 116.00 cm/s    AoV PG 5.00 mmHg    IVSd 1.22 (A) 0.6 - 0.9 cm    LVIDd 4.17 3.9 - 5.3 cm    LVIDs 3.85 cm    Pulmonic Regurgitant End Max Velocity 85.10 cm/s    LVOT Peak Gradient 3.00 mmHg    LVPWd 1.24 (A) 0.6 - 0.9 cm    LV E' Septal Velocity 4.22 cm/s    LV ED Vol A2C 72.50 cm3    BP EF 21.2 (A) 55 - 100 %    LV ES Vol A2C 57.10 cm3    E/E' septal 16.23     LV Ejection Fraction MOD 2C 8 %    Mitral Valve E Wave Deceleration Time 106.00 ms    MV A Luan 109.00 cm/s    MV E Luan 68.50 cm/s    MV E/A 0.63     Pulmonic Regurgitant End Max Velocity 157.00 cm/s    Pulmonic Valve Systolic Peak Instantaneous Gradient 10.00 mmHg    Pulmonic Regurgitant End Max Velocity 67.90 cm/s    Pulmonic Valve Systolic Peak Instantaneous Gradient 2.00 mmHg    Est. RA Pressure 8.00 mmHg    RVIDd 3.62 cm    RVSP 42.00 mmHg    Tricuspid Valve Max Velocity 292.00 cm/s    Triscuspid Valve Regurgitation Peak Gradient 34.00 mmHg    Right Atrial Area 4C 17.20 cm2    LV Mass .7 67 - 162 g    LV Mass AL Index 126.0 43 - 95 g/m2   GLUCOSE, POC    Collection Time: 02/28/21  4:04 PM   Result Value Ref Range    Glucose (POC) 104 (H) 65 - 100 mg/dL    Performed by Saurabh Gan          XR CHEST PORT   Final Result   Bilateral airspace disease/edema showing slight improvement compared   to the previous study. XR CHEST PORT   Final Result   Persistent bilateral airspace disease showing slight improvement   compared to earlier study. XR CHEST PORT   Final Result   Bilateral central airspace disease/edema right greater than left   showing slight worsening on the right. CTA CHEST W OR W WO CONT   Final Result   The findings may be a combination of extensive pneumonia,   pneumonitis and edema. No evidence of PE      XR CHEST SNGL V   Final Result      XR CHEST PORT    (Results Pending)        Patient Active Problem List   Diagnosis Code    PNA (pneumonia) J18.9    SOB (shortness of breath) R06.02        DIAGNOSES:  S/p CARDIO RESPIRATORY ARREST     R/O hyperkalemia  1. ESRD on hemodialysis dialysis  2. Pneumonia versus ARDS versus fluid overload  3. History of Goodpasture's syndrome  4. Secondary hyperparathyroidism  5. Anemia of chronic kidney disease  6. respiratory failure- with improved oxygenation- on vent    PLAN:    Pulmonary will evaluate patient  this evening to extubate     When I had discussion    Will monitor BP  And add antihypertensive medications as needed   will obtain renal panel and CBC in a.m.

## 2021-02-28 NOTE — PROGRESS NOTES
Progress Note      2/28/2021 6:08 AM  NAME: Laron Gallegos   MRN:  [id number]   Admit Diagnosis: PNA (pneumonia) [J18.9]  SOB (shortness of breath) [R06.02]      Problem List:   1. Incomplete database secondary to altered mental status  2. Patient presents for shortness of breath   3. Acute hypoxic respiratory failure acquiring BiPAP  4. Pneumonia  5. Fluid overload, pulmonary edema  6. Goodpasture's syndrome  7. End-stage renal disease dialysis dependent  8. Profound bradycardia  9. Possible seizure disorder  10. Gastroesophageal reflux disease (GERD)     11. Pancytopenia  12. Electrolyte abnormalities (hyponatremia, hyperkalemia, hypochloremia, and hypocalcemia       Subjective: The patient is seen and examined in room 284. There were no acute cardiovascular events reported overnight. The patient is intubated/sedated and is unable to give any meaningful history. Yesterday, she did receive hemodialysis for 3 hours. Her hematology panel has improved, and her electrolytes are much improved. We will discontinue IV dobutamine. Discussed with RN events overnight.      Medications Personally Reviewed:    Current Facility-Administered Medications   Medication Dose Route Frequency    hydrOXYzine pamoate (VISTARIL) capsule 50 mg  50 mg Oral Q6H PRN    [START ON 3/2/2021] VANCOMYCIN INFORMATION NOTE   Other ONCE    VANCOMYCIN INFORMATION NOTE 1 Each  1 Each Other Rx Dosing/Monitoring    aminophylline 500 mg in dextrose 5% 250 mL infusion  0.3 mg/kg/hr IntraVENous CONTINUOUS    DOBUTamine (DOBUTREX) 500 mg/250 mL (2,000 mcg/mL) infusion  5 mcg/kg/min IntraVENous CONTINUOUS    NOREPINephrine (LEVOPHED) 16,000 mcg in dextrose 5% 250 mL infusion  2-16 mcg/min IntraVENous TITRATE    insulin lispro (HUMALOG) injection   SubCUTAneous AC&HS    glucose chewable tablet 16 g  4 Tab Oral PRN    dextrose (D50W) injection syrg 12.5-25 g  25-50 mL IntraVENous PRN    glucagon (GLUCAGEN) injection 1 mg  1 mg IntraMUSCular PRN    [Held by provider] azaTHIOprine (IMURAN) tablet 25 mg  25 mg Oral DAILY    propofol (DIPRIVAN) 10 mg/mL infusion  0-50 mcg/kg/min IntraVENous TITRATE    metoprolol (LOPRESSOR) injection 5 mg  5 mg IntraVENous Q6H PRN    heparin (porcine) 1,000 unit/mL injection        methylPREDNISolone (PF) (SOLU-MEDROL) injection 40 mg  40 mg IntraVENous Q8H    vitamin B complex capsule 1 Cap  1 Cap Oral DAILY    pantoprazole (PROTONIX) tablet 40 mg  40 mg Oral DAILY    sevelamer carbonate (RENVELA) tab 800 mg  800 mg Oral TID WITH MEALS    acetaminophen (TYLENOL) tablet 650 mg  650 mg Oral Q6H PRN    Or    acetaminophen (TYLENOL) suppository 650 mg  650 mg Rectal Q6H PRN    polyethylene glycol (MIRALAX) packet 17 g  17 g Oral DAILY PRN    ondansetron (ZOFRAN ODT) tablet 4 mg  4 mg Oral Q8H PRN    Or    ondansetron (ZOFRAN) injection 4 mg  4 mg IntraVENous Q6H PRN    piperacillin-tazobactam (ZOSYN) 3.375 g in 0.9% sodium chloride (MBP/ADV) 100 mL MBP  3.375 g IntraVENous Q12H           Objective:      Physical Exam:  Last 24hrs VS reviewed since prior progress note. Most recent are:    Visit Vitals  BP (!) 148/99   Pulse 74   Temp 97.2 °F (36.2 °C)   Resp 16   Ht 5' 3\" (1.6 m)   Wt 46.1 kg (101 lb 10.1 oz)   SpO2 100%   BMI 18.00 kg/m²       Intake/Output Summary (Last 24 hours) at 2/28/2021 9580  Last data filed at 2/27/2021 1828  Gross per 24 hour   Intake    Output 2000 ml   Net -2000 ml        General Appearance: Well developed, orally intubated. Chest: Lungs clear to auscultation bilaterally. Cardiovascular: JVP is not elevated, PMI is attempted, normal intensity S1 and S2, without S3. Abdomen: Soft, non-tender, bowel sounds are active. Extremities: No edema bilaterally.     Data Review    Telemetry: Sinus rhythm without ventricular ectopy    EKG:   []  No new EKG for review    Lab Data Personally Reviewed:    Recent Labs     02/27/21  1300 02/26/21  1245   WBC 8.1 2.5*   HGB 13.5 9.0*   HCT 42.2 28.3*    145*     No results for input(s): INR, PTP, APTT, INREXT in the last 72 hours. Recent Labs     02/27/21  1815 02/27/21  1645 02/27/21  1300 02/26/21  1354     --  132* 133*   K 3.6  --  6.2* 6.4*   CL 99  --  99 96*   CO2 29  --  22 28   BUN 21*  --  61* 93*   CREA 2.97*  --  6.88* 8.98*   *  --  247* 117*   CA 8.9  --  7.9* 7.7*   MG  --  2.1  --   --      Recent Labs     02/26/21  1354   TROIQ <0.05     No results found for: CHOL, CHOLX, CHLST, CHOLV, HDL, HDLP, LDL, LDLC, DLDLP, Satnam Weahters, CHHD, CHHDX    Recent Labs     02/27/21  1815 02/27/21  1300 02/26/21  1354   AP  --  97 99   TP  --  6.9 6.0*   ALB 3.9 3.5 3.2*   GLOB  --  3.4 2.8     Recent Labs     02/27/21  1335 02/27/21  1050   PH 7.23* 7.40   PCO2 54* 31*   PO2 76 63*           Assessment/Plan:   1. Continue ICU care  2. Continue to monitor serum electrolytes  3. Obtain complete 2D echocardiogram  4. Discontinue IV Aminophyllin, and dobutamine  5.   Further recommendations depending on above results, and clinical course     Kelsi Hong MD

## 2021-02-28 NOTE — ROUTINE PROCESS
Patient admitted to ICU at approx 1230. Patient in respiratory distress on 100% BiPAP and intubated upon arrival.  
 
Patient skin is intact. Mepilex placed on sacrum for protection.

## 2021-03-01 ENCOUNTER — APPOINTMENT (OUTPATIENT)
Dept: GENERAL RADIOLOGY | Age: 71
DRG: 208 | End: 2021-03-01
Attending: INTERNAL MEDICINE
Payer: MEDICARE

## 2021-03-01 LAB
ALBUMIN SERPL-MCNC: 3 G/DL (ref 3.5–5)
ALBUMIN/GLOB SERPL: 1 {RATIO} (ref 1.1–2.2)
ALP SERPL-CCNC: 74 U/L (ref 45–117)
ALT SERPL-CCNC: 75 U/L (ref 12–78)
ANION GAP SERPL CALC-SCNC: 11 MMOL/L (ref 5–15)
ARTERIAL PATENCY WRIST A: ABNORMAL
AST SERPL W P-5'-P-CCNC: 49 U/L (ref 15–37)
BASE EXCESS BLDA CALC-SCNC: 1.9 MMOL/L (ref 0–2)
BASOPHILS # BLD: 0 K/UL (ref 0–0.1)
BASOPHILS NFR BLD: 0 % (ref 0–1)
BDY SITE: ABNORMAL
BILIRUB SERPL-MCNC: 0.4 MG/DL (ref 0.2–1)
BUN SERPL-MCNC: 61 MG/DL (ref 6–20)
BUN/CREAT SERPL: 9 (ref 12–20)
CA-I BLD-MCNC: 7.8 MG/DL (ref 8.5–10.1)
CHLORIDE SERPL-SCNC: 97 MMOL/L (ref 97–108)
CO2 SERPL-SCNC: 25 MMOL/L (ref 21–32)
CREAT SERPL-MCNC: 6.94 MG/DL (ref 0.55–1.02)
DIFFERENTIAL METHOD BLD: ABNORMAL
ECHO AR MAX VEL PISA: 239 CM/S
ECHO AV PEAK GRADIENT: 5 MMHG
ECHO AV REGURGITANT PHT: 1593 MS
ECHO EST RA PRESSURE: 8 MMHG
ECHO LV E' SEPTAL VELOCITY: 4.22 CM/S
ECHO LV EDV A2C: 72.5 CM3
ECHO LV EJECTION FRACTION A2C: 8 %
ECHO LV EJECTION FRACTION BIPLANE: 21.2 % (ref 55–100)
ECHO LV ESV A2C: 57.1 CM3
ECHO LV INTERNAL DIMENSION DIASTOLIC: 4.17 CM (ref 3.9–5.3)
ECHO LV INTERNAL DIMENSION SYSTOLIC: 3.85 CM
ECHO LV IVSD: 1.22 CM (ref 0.6–0.9)
ECHO LV MASS 2D: 182.7 G (ref 67–162)
ECHO LV MASS INDEX 2D: 126 G/M2 (ref 43–95)
ECHO LV POSTERIOR WALL DIASTOLIC: 1.24 CM (ref 0.6–0.9)
ECHO LVOT PEAK GRADIENT: 3 MMHG
ECHO MV A VELOCITY: 109 CM/S
ECHO MV E DECELERATION TIME (DT): 106 MS
ECHO MV E VELOCITY: 68.5 CM/S
ECHO MV E/A RATIO: 0.63
ECHO MV E/E' SEPTAL: 16.23
ECHO PV PEAK INSTANTANEOUS GRADIENT SYSTOLIC: 10 MMHG
ECHO PV PEAK INSTANTANEOUS GRADIENT SYSTOLIC: 2 MMHG
ECHO PV REGURGITANT MAX VELOCITY: 116 CM/S
ECHO PV REGURGITANT MAX VELOCITY: 157 CM/S
ECHO PV REGURGITANT MAX VELOCITY: 67.9 CM/S
ECHO PV REGURGITANT MAX VELOCITY: 85.1 CM/S
ECHO RA AREA 4C: 17.2 CM2
ECHO RIGHT VENTRICULAR SYSTOLIC PRESSURE (RVSP): 42 MMHG
ECHO RV INTERNAL DIMENSION: 3.62 CM
ECHO TV MAX VELOCITY: 292 CM/S
ECHO TV REGURGITANT PEAK GRADIENT: 34 MMHG
EOSINOPHIL # BLD: 0 K/UL (ref 0–0.4)
EOSINOPHIL NFR BLD: 0 % (ref 0–7)
EPAP/CPAP/PEEP, PAPEEP: 5
ERYTHROCYTE [DISTWIDTH] IN BLOOD BY AUTOMATED COUNT: 16.5 % (ref 11.5–14.5)
FIO2 ON VENT: 50 %
GAS FLOW.O2 SETTING OXYMISER: 12 L/MIN
GLOBULIN SER CALC-MCNC: 2.9 G/DL (ref 2–4)
GLUCOSE BLD STRIP.AUTO-MCNC: 114 MG/DL (ref 65–100)
GLUCOSE BLD STRIP.AUTO-MCNC: 127 MG/DL (ref 65–100)
GLUCOSE BLD STRIP.AUTO-MCNC: 128 MG/DL (ref 65–100)
GLUCOSE BLD STRIP.AUTO-MCNC: 198 MG/DL (ref 65–100)
GLUCOSE SERPL-MCNC: 192 MG/DL (ref 65–100)
HCO3 BLDA-SCNC: 26 MMOL/L (ref 22–26)
HCT VFR BLD AUTO: 32.9 % (ref 35–47)
HGB BLD-MCNC: 10.5 G/DL (ref 11.5–16)
IMM GRANULOCYTES # BLD AUTO: 0 K/UL (ref 0–0.04)
IMM GRANULOCYTES NFR BLD AUTO: 0 % (ref 0–0.5)
IPAP/PIP, IPAPIP: 0
LYMPHOCYTES # BLD: 0.2 K/UL (ref 0.8–3.5)
LYMPHOCYTES NFR BLD: 3 % (ref 12–49)
MCH RBC QN AUTO: 28.4 PG (ref 26–34)
MCHC RBC AUTO-ENTMCNC: 31.9 G/DL (ref 30–36.5)
MCV RBC AUTO: 88.9 FL (ref 80–99)
MONOCYTES # BLD: 0.2 K/UL (ref 0–1)
MONOCYTES NFR BLD: 4 % (ref 5–13)
NEUTS SEG # BLD: 5.9 K/UL (ref 1.8–8)
NEUTS SEG NFR BLD: 93 % (ref 32–75)
PCO2 BLDA: 41 MMHG (ref 35–45)
PERFORMED BY, TECHID: ABNORMAL
PH BLDA: 7.42 [PH] (ref 7.35–7.45)
PHOSPHATE SERPL-MCNC: 6.9 MG/DL (ref 2.6–4.7)
PLATELET # BLD AUTO: 143 K/UL (ref 150–400)
PMV BLD AUTO: 12 FL (ref 8.9–12.9)
PO2 BLDA: 237 MMHG (ref 75–100)
POTASSIUM SERPL-SCNC: 5.3 MMOL/L (ref 3.5–5.1)
PROT SERPL-MCNC: 5.9 G/DL (ref 6.4–8.2)
RBC # BLD AUTO: 3.7 M/UL (ref 3.8–5.2)
SAO2 % BLD: 101 %
SAO2% DEVICE SAO2% SENSOR NAME: ABNORMAL
SODIUM SERPL-SCNC: 133 MMOL/L (ref 136–145)
VENTILATION MODE VENT: ABNORMAL
VT SETTING VENT: 400 ML
WBC # BLD AUTO: 6.3 K/UL (ref 3.6–11)

## 2021-03-01 PROCEDURE — 74011000258 HC RX REV CODE- 258: Performed by: FAMILY MEDICINE

## 2021-03-01 PROCEDURE — 74011636637 HC RX REV CODE- 636/637: Performed by: FAMILY MEDICINE

## 2021-03-01 PROCEDURE — 36415 COLL VENOUS BLD VENIPUNCTURE: CPT

## 2021-03-01 PROCEDURE — 65610000006 HC RM INTENSIVE CARE

## 2021-03-01 PROCEDURE — 74011250636 HC RX REV CODE- 250/636: Performed by: FAMILY MEDICINE

## 2021-03-01 PROCEDURE — 84100 ASSAY OF PHOSPHORUS: CPT

## 2021-03-01 PROCEDURE — 82803 BLOOD GASES ANY COMBINATION: CPT

## 2021-03-01 PROCEDURE — 82962 GLUCOSE BLOOD TEST: CPT

## 2021-03-01 PROCEDURE — 93005 ELECTROCARDIOGRAM TRACING: CPT

## 2021-03-01 PROCEDURE — 85025 COMPLETE CBC W/AUTO DIFF WBC: CPT

## 2021-03-01 PROCEDURE — 80053 COMPREHEN METABOLIC PANEL: CPT

## 2021-03-01 PROCEDURE — 77010033678 HC OXYGEN DAILY

## 2021-03-01 PROCEDURE — 74011250636 HC RX REV CODE- 250/636: Performed by: INTERNAL MEDICINE

## 2021-03-01 PROCEDURE — 71045 X-RAY EXAM CHEST 1 VIEW: CPT

## 2021-03-01 PROCEDURE — 74011250637 HC RX REV CODE- 250/637: Performed by: FAMILY MEDICINE

## 2021-03-01 PROCEDURE — 74011250637 HC RX REV CODE- 250/637: Performed by: INTERNAL MEDICINE

## 2021-03-01 PROCEDURE — 94003 VENT MGMT INPAT SUBQ DAY: CPT

## 2021-03-01 RX ORDER — HYDRALAZINE HYDROCHLORIDE 20 MG/ML
10 INJECTION INTRAMUSCULAR; INTRAVENOUS ONCE
Status: COMPLETED | OUTPATIENT
Start: 2021-03-01 | End: 2021-03-01

## 2021-03-01 RX ORDER — AMLODIPINE BESYLATE 5 MG/1
10 TABLET ORAL DAILY
Status: DISCONTINUED | OUTPATIENT
Start: 2021-03-01 | End: 2021-03-08 | Stop reason: HOSPADM

## 2021-03-01 RX ORDER — HYDRALAZINE HYDROCHLORIDE 20 MG/ML
10 INJECTION INTRAMUSCULAR; INTRAVENOUS
Status: DISCONTINUED | OUTPATIENT
Start: 2021-03-01 | End: 2021-03-08 | Stop reason: HOSPADM

## 2021-03-01 RX ORDER — HYDRALAZINE HYDROCHLORIDE 25 MG/1
25 TABLET, FILM COATED ORAL 3 TIMES DAILY
Status: DISCONTINUED | OUTPATIENT
Start: 2021-03-01 | End: 2021-03-03

## 2021-03-01 RX ORDER — CARVEDILOL 3.12 MG/1
6.25 TABLET ORAL 2 TIMES DAILY WITH MEALS
Status: DISCONTINUED | OUTPATIENT
Start: 2021-03-01 | End: 2021-03-08 | Stop reason: HOSPADM

## 2021-03-01 RX ADMIN — PIPERACILLIN AND TAZOBACTAM 3.38 G: 3; .375 INJECTION, POWDER, LYOPHILIZED, FOR SOLUTION INTRAVENOUS at 09:40

## 2021-03-01 RX ADMIN — HYDRALAZINE HYDROCHLORIDE 25 MG: 25 TABLET ORAL at 17:03

## 2021-03-01 RX ADMIN — METHYLPREDNISOLONE SODIUM SUCCINATE 40 MG: 40 INJECTION, POWDER, FOR SOLUTION INTRAMUSCULAR; INTRAVENOUS at 13:25

## 2021-03-01 RX ADMIN — METHYLPREDNISOLONE SODIUM SUCCINATE 40 MG: 40 INJECTION, POWDER, FOR SOLUTION INTRAMUSCULAR; INTRAVENOUS at 05:47

## 2021-03-01 RX ADMIN — INSULIN LISPRO 3 UNITS: 100 INJECTION, SOLUTION INTRAVENOUS; SUBCUTANEOUS at 07:30

## 2021-03-01 RX ADMIN — CARVEDILOL 6.25 MG: 3.12 TABLET, FILM COATED ORAL at 17:03

## 2021-03-01 RX ADMIN — SEVELAMER CARBONATE 800 MG: 800 TABLET, FILM COATED ORAL at 17:03

## 2021-03-01 RX ADMIN — HYDRALAZINE HYDROCHLORIDE 10 MG: 20 INJECTION INTRAMUSCULAR; INTRAVENOUS at 12:41

## 2021-03-01 RX ADMIN — Medication 1 CAPSULE: at 16:48

## 2021-03-01 RX ADMIN — METHYLPREDNISOLONE SODIUM SUCCINATE 40 MG: 40 INJECTION, POWDER, FOR SOLUTION INTRAMUSCULAR; INTRAVENOUS at 21:05

## 2021-03-01 RX ADMIN — HYDRALAZINE HYDROCHLORIDE 25 MG: 25 TABLET ORAL at 21:05

## 2021-03-01 RX ADMIN — SEVELAMER CARBONATE 800 MG: 800 TABLET, FILM COATED ORAL at 13:25

## 2021-03-01 RX ADMIN — HYDRALAZINE HYDROCHLORIDE 25 MG: 25 TABLET ORAL at 13:36

## 2021-03-01 RX ADMIN — PIPERACILLIN AND TAZOBACTAM 3.38 G: 3; .375 INJECTION, POWDER, LYOPHILIZED, FOR SOLUTION INTRAVENOUS at 21:05

## 2021-03-01 RX ADMIN — PANTOPRAZOLE SODIUM 40 MG: 40 TABLET, DELAYED RELEASE ORAL at 09:41

## 2021-03-01 RX ADMIN — PROPOFOL 30 MCG/KG/MIN: 10 INJECTION, EMULSION INTRAVENOUS at 05:14

## 2021-03-01 RX ADMIN — SEVELAMER CARBONATE 800 MG: 800 TABLET, FILM COATED ORAL at 09:48

## 2021-03-01 RX ADMIN — PROPOFOL 35 MCG/KG/MIN: 10 INJECTION, EMULSION INTRAVENOUS at 02:06

## 2021-03-01 RX ADMIN — AMLODIPINE BESYLATE 10 MG: 5 TABLET ORAL at 13:37

## 2021-03-01 NOTE — CONSULTS
PULMONARY ICU NOTE  [facility]    Name: Love Hernandez MRN: [id number]   : 1950 Hospital: [facility]   Date: 3/1/2021  Admission date: 2021 Hospital Day: 4       HPI:     Hospital Problems  Never Reviewed          Codes Class Noted POA    PNA (pneumonia) ICD-10-CM: J18.9  ICD-9-CM: 819  2021 Unknown        SOB (shortness of breath) ICD-10-CM: R06.02  ICD-9-CM: 786.05  2021 Unknown                   [x] High complexity decision making was performed  [x] See my orders for details      Subjective/Initial History:     I was asked by Geoffrey Rai MD to see Love Hernandez  a 72 y.o.  female in consultation     Excerpts from admission 2021 or consult notes as follows:   59-year-old lady came in because of shortness of breath and dyspnea significant past medical history of end-stage renal disease on hemodialysis, Goodpasture syndrome anemia of chronic disease hypertension steroid-dependent, she was not able to dialysis at home because of generalized weakness and shortness of breath she has been on home dialysis no fever no chills she was put on 100% nonrebreather mask which has been switched to noninvasive ventilator not she is going for dialysis because of severe hypoxia so pulmonary critical care consult was called for further evaluation. She is a lifetime non-smoker.       No Known Allergies     MAR reviewed and pertinent medications noted or modified as needed     Current Facility-Administered Medications   Medication    hydrOXYzine pamoate (VISTARIL) capsule 50 mg    [START ON 3/2/2021] VANCOMYCIN INFORMATION NOTE    VANCOMYCIN INFORMATION NOTE 1 Each    DOBUTamine (DOBUTREX) 500 mg/250 mL (2,000 mcg/mL) infusion    NOREPINephrine (LEVOPHED) 16,000 mcg in dextrose 5% 250 mL infusion    insulin lispro (HUMALOG) injection    glucose chewable tablet 16 g    dextrose (D50W) injection syrg 12.5-25 g    glucagon (GLUCAGEN) injection 1 mg    [Held by provider] azaTHIOprine (IMURAN) tablet 25 mg    propofol (DIPRIVAN) 10 mg/mL infusion    metoprolol (LOPRESSOR) injection 5 mg    methylPREDNISolone (PF) (SOLU-MEDROL) injection 40 mg    vitamin B complex capsule 1 Cap    pantoprazole (PROTONIX) tablet 40 mg    sevelamer carbonate (RENVELA) tab 800 mg    acetaminophen (TYLENOL) tablet 650 mg    Or    acetaminophen (TYLENOL) suppository 650 mg    polyethylene glycol (MIRALAX) packet 17 g    ondansetron (ZOFRAN ODT) tablet 4 mg    Or    ondansetron (ZOFRAN) injection 4 mg    piperacillin-tazobactam (ZOSYN) 3.375 g in 0.9% sodium chloride (MBP/ADV) 100 mL MBP      Patient PCP: None  PMH:  has a past medical history of Chronic kidney disease and Heart failure ([facility] [address] PSH:   has no past surgical history on file. FHX: family history is not on file. SHX:  reports that she has never smoked. She has never used smokeless tobacco. She reports previous alcohol use. She reports previous drug use. ROS:  Unable to obtain as patient was lethargic and severely short of breath      Objective:     Vital Signs: Telemetry:    normal sinus rhythm Intake/Output:   Visit Vitals  BP (!) 149/97 (BP 1 Location: Left arm, BP Patient Position: At rest;Supine)   Pulse 69   Temp 97.5 °F (36.4 °C)   Resp 16   Ht 5' 3\" (1.6 m)   Wt 48.3 kg (106 lb 7.7 oz)   SpO2 100%   BMI 18.86 kg/m²       Temp (24hrs), Av.7 °F (36.5 °C), Min:97.5 °F (36.4 °C), Max:97.8 °F (36.6 °C)        O2 Device: Ventilator O2 Flow Rate (L/min): 6 l/min       Wt Readings from Last 4 Encounters:   21 48.3 kg (106 lb 7.7 oz)          Intake/Output Summary (Last 24 hours) at 3/1/2021 0850  Last data filed at 2021 1727  Gross per 24 hour   Intake 20 ml   Output    Net 20 ml       Last shift:      No intake/output data recorded.   Last 3 shifts: 1901 -  0700  In: 20   Out: -        Physical Exam:   Patient is intubated on ventilator  Physical Exam   Constitutional: She appears distressed. HENT:   Head: Normocephalic and atraumatic. Eyes: Pupils are equal, round, and reactive to light. EOM are normal.   Neck: Normal range of motion. Neck supple. Cardiovascular: Normal rate and regular rhythm. Pulmonary/Chest: She is in respiratory distress. She has rales. Abdominal: Soft. Musculoskeletal: Normal range of motion. Neurological: She is alert. Skin: Skin is warm. Labs:    Recent Labs     03/01/21  0558 02/28/21  0430 02/27/21  1300   WBC 6.3 8.4 8.1   HGB 10.5* 10.6* 13.5   * 139* 156     Recent Labs     03/01/21  0558 02/28/21  0430 02/27/21  1815 02/27/21  1645 02/27/21  1300 02/26/21  1430   * 136 138  --  132*  --    K 5.3* 4.7 3.6  --  6.2*  --    CL 97 97 99  --  99  --    CO2 25 27 29  --  22  --    * 125* 105*  --  247*  --    BUN 61* 39* 21*  --  61*  --    CREA 6.94* 5.38* 2.97*  --  6.88*  --    CA 7.8* 8.4* 8.9  --  7.9*  --    MG  --   --   --  2.1  --   --    PHOS  --  5.3* 2.9  --   --   --    LAC  --   --   --   --  3.3* 1.5   ALB 3.0* 3.2* 3.9  --  3.5  --    ALT 75 80*  --   --  113*  --      Recent Labs     03/01/21  0535 02/28/21  0425 02/27/21  1335   PH 7.42 7.51* 7.23*   PCO2 41 33* 54*   PO2 237* 395* 76   HCO3 26 28* 20*   FIO2 50.0 100.0 100.0     Recent Labs     02/26/21  1354   TROIQ <0.05     No results found for: BNPP, BNP   Lab Results   Component Value Date/Time    Culture result: No growth after 22 hours 02/26/2021 02:30 PM   No results found for: TSH, TSHEXT, TSHEXT    Imaging:    CXR Results  (Last 48 hours)               03/01/21 0320  XR CHEST PORT Final result    Impression:  Stable appearance of endotracheal tube, right central dialysis   catheter, gastric tube, and temporary right ventricular pacing lead. Bilateral   pulmonary edema and small pleural effusions, consistent with clinical CHF,   subjectively slightly improved. No pneumothorax or other acute change.        Narrative:  Portable chest, 9505 hours, compared with 2/28/2021.           02/28/21 0320  XR CHEST PORT Final result    Impression:  Bilateral airspace disease/edema showing slight improvement compared   to the previous study. Narrative:  Portable upright radiograph chest 6:54 AM compared to February 27, 2021. INDICATION: CHF. Heart size is within normal limits. Right IJ catheter tips project over the   right atrium. Nasogastric tube projects over the stomach. Diffuse bilateral airspace disease or edema shows some improvement compared to   previous study. Endotracheal tube tip is 6 cm above the geeta. No pneumothorax. Skinfold projects over the left upper lobe. 02/27/21 1119  XR CHEST PORT Final result    Impression:  Persistent bilateral airspace disease showing slight improvement   compared to earlier study. Narrative:  Upright radiograph chest 11:12 AM compared with 3/27/2021 at 7:19 AM.       INDICATION: Tachypnea, pneumonia. Right IJ central line remains in place. Heart size is stable. Extensive   bilateral airspace disease, right greater than left, in a predominantly central   distribution shows minimal improvement compared to the previous study. Defibrillator pads project over the right upper chest and left lower chest. No   pneumothorax. Definite displaced fractures. Next               Results from [place] encounter on 02/26/21   XR CHEST PORT    Narrative Portable chest, 0318 hours, compared with 2/28/2021. Impression Stable appearance of endotracheal tube, right central dialysis  catheter, gastric tube, and temporary right ventricular pacing lead. Bilateral  pulmonary edema and small pleural effusions, consistent with clinical CHF,  subjectively slightly improved. No pneumothorax or other acute change. XR CHEST PORT    Narrative Upright radiograph chest 11:12 AM compared with 3/27/2021 at 7:19 AM.    INDICATION: Tachypnea, pneumonia.     Right IJ central line remains in place. Heart size is stable. Extensive  bilateral airspace disease, right greater than left, in a predominantly central  distribution shows minimal improvement compared to the previous study. Defibrillator pads project over the right upper chest and left lower chest. No  pneumothorax. Definite displaced fractures. Next      Impression Persistent bilateral airspace disease showing slight improvement  compared to earlier study. XR CHEST PORT    Narrative Portable upright radiograph chest 6:54 AM compared to February 27, 2021. INDICATION: CHF. Heart size is within normal limits. Right IJ catheter tips project over the  right atrium. Nasogastric tube projects over the stomach. Diffuse bilateral airspace disease or edema shows some improvement compared to  previous study. Endotracheal tube tip is 6 cm above the geeta. No pneumothorax. Skinfold projects over the left upper lobe. Impression Bilateral airspace disease/edema showing slight improvement compared  to the previous study. Results from [place] encounter on 02/26/21   CTA CHEST W OR W WO CONT    Narrative CT dose reduction was achieved through use of a standardized protocol tailored  for this examination and automatic exposure control for dose modulation. Contrast study maximum intensity projections    There is a extensive diffuse lung disease. Dense consolidation is seen  throughout much of the right lower lobe and there is mild variable groundglass  opacification and small foci of dense consolidation scattered elsewhere  throughout much of each lung, right greater than left, with subpleural sparing,  mostly on the LEFT. Right middle lobe is disproportionately less involved, as is  the anterior left upper lobe. Central airways are open    Pulmonary arteries are densely opacified and show no filling defect or  distortion. Aorta shows normal dimensions, without dissection. No mediastinal or  hilar adenopathy.  Small moderate symmetric pleural effusions. No pericardial  effusion. No significant abdominal finding. Body wall edema      Impression The findings may be a combination of extensive pneumonia,  pneumonitis and edema. No evidence of PE         IMPRESSION:   1. Acute hypoxic respiratory failure  2. History of Goodpasture syndrome  3. Severe cardiomyopathy ejection fraction about 12 to 17%  4. Fluid overload pulmonary edema  5. End-stage renal disease on hemodialysis  6. Neutropenia and hyperkalemia  7. Anemia  8. Bradycardia  Body mass index is 18.86 kg/m². 9. Pt is requiring Drug therapy requiring intensive monitoring for toxicity  10. Pt is unstable, unpredictable needing inpatient monitoring; is acutely ill and at high risk of sudden decline and decompensation with severe consequenses and continued end organ dysfunction and failure  11. Prognosis guarded       RECOMMENDATIONS/PLAN:     1. Patient is intubated on ventilator assist control mode will decrease FiO2 and will start weaning when more hemodynamically stable on 50% FiO2 Will start weaning we will discontinue sedation change vent settings to spontaneous  2. CAT scan of the chest shows pulmonary edema CHF with prior history of Goodpasture syndrome. 3. Agree with dialysis   4. Agree with Empiric IV antibiotics pending culture results she is Zosyn  5. Follow culture results   6. Patient is afebrile normal white count  7. 2D echo pending off aminophylline and dobutamine  8. Supplemental O2 to keep sats > 93%  9. Aspiration precautions  10. Labs to follow electrolytes, renal function and and blood counts  11. Glucose monitoring and SSI  12. Bronchial hygiene with respiratory therapy techniques, bronchodilators  13.  DVT, SUP prophylaxis       ·           Avelino Leos MD

## 2021-03-01 NOTE — PROGRESS NOTES
Pharmacy has ordered a pre-dialysis vanc level for tomorrow. Level will be evaluated and dose given if warranted.    Thanks you for including pharmacy in Ms Heather care.    Edwige Rizo RPh

## 2021-03-01 NOTE — PROGRESS NOTES
General Daily Progress Note          Patient Name:   Arti Monroy       YOB: 1950       Age:  72 y.o.       Admit Date: 2/26/2021      Subjective:   Patient on ventilator 30% O2  Temporary pacemaker in place                 Objective:     Visit Vitals  BP (!) 193/126 (BP 1 Location: Left leg)   Pulse 80   Temp 97.5 °F (36.4 °C)   Resp 17   Ht 5' 3\" (1.6 m)   Wt 48.3 kg (106 lb 7.7 oz)   SpO2 100%   BMI 18.86 kg/m²        Recent Results (from the past 24 hour(s))   GLUCOSE, POC    Collection Time: 02/28/21 10:45 AM   Result Value Ref Range    Glucose (POC) 103 (H) 65 - 100 mg/dL    Performed by Saurabh Gan    ECHO ADULT COMPLETE    Collection Time: 02/28/21  2:15 PM   Result Value Ref Range    Aortic Regurgitant Pressure Half-time 1,593.00 ms    AR Max Luan 239.00 cm/s    Pulmonic Regurgitant End Max Velocity 116.00 cm/s    AoV PG 5.00 mmHg    IVSd 1.22 (A) 0.6 - 0.9 cm    LVIDd 4.17 3.9 - 5.3 cm    LVIDs 3.85 cm    Pulmonic Regurgitant End Max Velocity 85.10 cm/s    LVOT Peak Gradient 3.00 mmHg    LVPWd 1.24 (A) 0.6 - 0.9 cm    LV E' Septal Velocity 4.22 cm/s    LV ED Vol A2C 72.50 cm3    BP EF 21.2 (A) 55 - 100 %    LV ES Vol A2C 57.10 cm3    E/E' septal 16.23     LV Ejection Fraction MOD 2C 8 %    Mitral Valve E Wave Deceleration Time 106.00 ms    MV A Luan 109.00 cm/s    MV E Luan 68.50 cm/s    MV E/A 0.63     Pulmonic Regurgitant End Max Velocity 157.00 cm/s    Pulmonic Valve Systolic Peak Instantaneous Gradient 10.00 mmHg    Pulmonic Regurgitant End Max Velocity 67.90 cm/s    Pulmonic Valve Systolic Peak Instantaneous Gradient 2.00 mmHg    Est. RA Pressure 8.00 mmHg    RVIDd 3.62 cm    RVSP 42.00 mmHg    Tricuspid Valve Max Velocity 292.00 cm/s    Triscuspid Valve Regurgitation Peak Gradient 34.00 mmHg    Right Atrial Area 4C 17.20 cm2    LV Mass .7 67 - 162 g    LV Mass AL Index 126.0 43 - 95 g/m2   GLUCOSE, POC    Collection Time: 02/28/21  4:04 PM   Result Value Ref Range    Glucose (POC) 104 (H) 65 - 100 mg/dL    Performed by Kaya Hernandez    GLUCOSE, POC    Collection Time: 02/28/21  9:50 PM   Result Value Ref Range    Glucose (POC) 146 (H) 65 - 100 mg/dL    Performed by Blessing Paulson    BLOOD GAS, ARTERIAL    Collection Time: 03/01/21  5:35 AM   Result Value Ref Range    pH 7.42 7.35 - 7.45      PCO2 41 35 - 45 mmHg    PO2 237 (H) 75 - 100 mmHg    O2  >95 %    BICARBONATE 26 22 - 26 mmol/L    BASE EXCESS 1.9 0 - 2 mmol/L    O2 METHOD VENT      FIO2 50.0 %    MODE Assist Control/Volume Control      Tidal volume 400      SET RATE 12      IPAP/PIP 0      EPAP/CPAP/PEEP 5.0      SITE Right Radial      JOVITA'S TEST PASS     CBC WITH AUTOMATED DIFF    Collection Time: 03/01/21  5:58 AM   Result Value Ref Range    WBC 6.3 3.6 - 11.0 K/uL    RBC 3.70 (L) 3.80 - 5.20 M/uL    HGB 10.5 (L) 11.5 - 16.0 g/dL    HCT 32.9 (L) 35.0 - 47.0 %    MCV 88.9 80.0 - 99.0 FL    MCH 28.4 26.0 - 34.0 PG    MCHC 31.9 30.0 - 36.5 g/dL    RDW 16.5 (H) 11.5 - 14.5 %    PLATELET 438 (L) 521 - 400 K/uL    MPV 12.0 8.9 - 12.9 FL    NEUTROPHILS 93 (H) 32 - 75 %    LYMPHOCYTES 3 (L) 12 - 49 %    MONOCYTES 4 (L) 5 - 13 %    EOSINOPHILS 0 0 - 7 %    BASOPHILS 0 0 - 1 %    IMMATURE GRANULOCYTES 0 0.0 - 0.5 %    ABS. NEUTROPHILS 5.9 1.8 - 8.0 K/UL    ABS. LYMPHOCYTES 0.2 (L) 0.8 - 3.5 K/UL    ABS. MONOCYTES 0.2 0.0 - 1.0 K/UL    ABS. EOSINOPHILS 0.0 0.0 - 0.4 K/UL    ABS. BASOPHILS 0.0 0.0 - 0.1 K/UL    ABS. IMM.  GRANS. 0.0 0.00 - 0.04 K/UL    DF AUTOMATED     METABOLIC PANEL, COMPREHENSIVE    Collection Time: 03/01/21  5:58 AM   Result Value Ref Range    Sodium 133 (L) 136 - 145 mmol/L    Potassium 5.3 (H) 3.5 - 5.1 mmol/L    Chloride 97 97 - 108 mmol/L    CO2 25 21 - 32 mmol/L    Anion gap 11 5 - 15 mmol/L    Glucose 192 (H) 65 - 100 mg/dL    BUN 61 (H) 6 - 20 mg/dL    Creatinine 6.94 (H) 0.55 - 1.02 mg/dL    BUN/Creatinine ratio 9 (L) 12 - 20      GFR est AA 7 (L) >60 ml/min/1.73m2    GFR est non-AA 6 (L) >60 ml/min/1.73m2    Calcium 7.8 (L) 8.5 - 10.1 mg/dL    Bilirubin, total 0.4 0.2 - 1.0 mg/dL    AST (SGOT) 49 (H) 15 - 37 U/L    ALT (SGPT) 75 12 - 78 U/L    Alk. phosphatase 74 45 - 117 U/L    Protein, total 5.9 (L) 6.4 - 8.2 g/dL    Albumin 3.0 (L) 3.5 - 5.0 g/dL    Globulin 2.9 2.0 - 4.0 g/dL    A-G Ratio 1.0 (L) 1.1 - 2.2     GLUCOSE, POC    Collection Time: 03/01/21  7:27 AM   Result Value Ref Range    Glucose (POC) 198 (H) 65 - 100 mg/dL    Performed by Gualberto Jiménez      [unfilled]      Review of Systems  Unable to obtain. Physical Exam:      Constitutional: Very lethargic and short of breath  HENT:   Head: Normocephalic and atraumatic. Eyes: Pupils are equal, round, and reactive to light. EOM are normal.   Cardiovascular: Normal rate, regular rhythm and normal heart sounds. Pulmonary/Chest: Decreased breath sounds   abdominal: Soft. Bowel sounds are normal. There is no abdominal tenderness. There is no rebound and no guarding. Musculoskeletal: Normal range of motion. Neurological: Lethargic and sleepy    XR CHEST PORT   Final Result   Stable appearance of endotracheal tube, right central dialysis   catheter, gastric tube, and temporary right ventricular pacing lead. Bilateral   pulmonary edema and small pleural effusions, consistent with clinical CHF,   subjectively slightly improved. No pneumothorax or other acute change. XR CHEST PORT   Final Result   Bilateral airspace disease/edema showing slight improvement compared   to the previous study. XR CHEST PORT   Final Result   Persistent bilateral airspace disease showing slight improvement   compared to earlier study. XR CHEST PORT   Final Result   Bilateral central airspace disease/edema right greater than left   showing slight worsening on the right. CTA CHEST W OR W WO CONT   Final Result   The findings may be a combination of extensive pneumonia,   pneumonitis and edema.  No evidence of PE      XR CHEST SNGL V Final Result      XR CHEST PORT    (Results Pending)        Recent Results (from the past 24 hour(s))   GLUCOSE, POC    Collection Time: 02/28/21 10:45 AM   Result Value Ref Range    Glucose (POC) 103 (H) 65 - 100 mg/dL    Performed by Saurabh Gan    ECHO ADULT COMPLETE    Collection Time: 02/28/21  2:15 PM   Result Value Ref Range    Aortic Regurgitant Pressure Half-time 1,593.00 ms    AR Max Luan 239.00 cm/s    Pulmonic Regurgitant End Max Velocity 116.00 cm/s    AoV PG 5.00 mmHg    IVSd 1.22 (A) 0.6 - 0.9 cm    LVIDd 4.17 3.9 - 5.3 cm    LVIDs 3.85 cm    Pulmonic Regurgitant End Max Velocity 85.10 cm/s    LVOT Peak Gradient 3.00 mmHg    LVPWd 1.24 (A) 0.6 - 0.9 cm    LV E' Septal Velocity 4.22 cm/s    LV ED Vol A2C 72.50 cm3    BP EF 21.2 (A) 55 - 100 %    LV ES Vol A2C 57.10 cm3    E/E' septal 16.23     LV Ejection Fraction MOD 2C 8 %    Mitral Valve E Wave Deceleration Time 106.00 ms    MV A Luan 109.00 cm/s    MV E Luan 68.50 cm/s    MV E/A 0.63     Pulmonic Regurgitant End Max Velocity 157.00 cm/s    Pulmonic Valve Systolic Peak Instantaneous Gradient 10.00 mmHg    Pulmonic Regurgitant End Max Velocity 67.90 cm/s    Pulmonic Valve Systolic Peak Instantaneous Gradient 2.00 mmHg    Est. RA Pressure 8.00 mmHg    RVIDd 3.62 cm    RVSP 42.00 mmHg    Tricuspid Valve Max Velocity 292.00 cm/s    Triscuspid Valve Regurgitation Peak Gradient 34.00 mmHg    Right Atrial Area 4C 17.20 cm2    LV Mass .7 67 - 162 g    LV Mass AL Index 126.0 43 - 95 g/m2   GLUCOSE, POC    Collection Time: 02/28/21  4:04 PM   Result Value Ref Range    Glucose (POC) 104 (H) 65 - 100 mg/dL    Performed by Saurabh Gan    GLUCOSE, POC    Collection Time: 02/28/21  9:50 PM   Result Value Ref Range    Glucose (POC) 146 (H) 65 - 100 mg/dL    Performed by Bridger Aguirre    BLOOD GAS, ARTERIAL    Collection Time: 03/01/21  5:35 AM   Result Value Ref Range    pH 7.42 7.35 - 7.45      PCO2 41 35 - 45 mmHg    PO2 237 (H) 75 - 100 mmHg    O2  >95 %    BICARBONATE 26 22 - 26 mmol/L    BASE EXCESS 1.9 0 - 2 mmol/L    O2 METHOD VENT      FIO2 50.0 %    MODE Assist Control/Volume Control      Tidal volume 400      SET RATE 12      IPAP/PIP 0      EPAP/CPAP/PEEP 5.0      SITE Right Radial      JOVITA'S TEST PASS     CBC WITH AUTOMATED DIFF    Collection Time: 03/01/21  5:58 AM   Result Value Ref Range    WBC 6.3 3.6 - 11.0 K/uL    RBC 3.70 (L) 3.80 - 5.20 M/uL    HGB 10.5 (L) 11.5 - 16.0 g/dL    HCT 32.9 (L) 35.0 - 47.0 %    MCV 88.9 80.0 - 99.0 FL    MCH 28.4 26.0 - 34.0 PG    MCHC 31.9 30.0 - 36.5 g/dL    RDW 16.5 (H) 11.5 - 14.5 %    PLATELET 697 (L) 092 - 400 K/uL    MPV 12.0 8.9 - 12.9 FL    NEUTROPHILS 93 (H) 32 - 75 %    LYMPHOCYTES 3 (L) 12 - 49 %    MONOCYTES 4 (L) 5 - 13 %    EOSINOPHILS 0 0 - 7 %    BASOPHILS 0 0 - 1 %    IMMATURE GRANULOCYTES 0 0.0 - 0.5 %    ABS. NEUTROPHILS 5.9 1.8 - 8.0 K/UL    ABS. LYMPHOCYTES 0.2 (L) 0.8 - 3.5 K/UL    ABS. MONOCYTES 0.2 0.0 - 1.0 K/UL    ABS. EOSINOPHILS 0.0 0.0 - 0.4 K/UL    ABS. BASOPHILS 0.0 0.0 - 0.1 K/UL    ABS. IMM. GRANS. 0.0 0.00 - 0.04 K/UL    DF AUTOMATED     METABOLIC PANEL, COMPREHENSIVE    Collection Time: 03/01/21  5:58 AM   Result Value Ref Range    Sodium 133 (L) 136 - 145 mmol/L    Potassium 5.3 (H) 3.5 - 5.1 mmol/L    Chloride 97 97 - 108 mmol/L    CO2 25 21 - 32 mmol/L    Anion gap 11 5 - 15 mmol/L    Glucose 192 (H) 65 - 100 mg/dL    BUN 61 (H) 6 - 20 mg/dL    Creatinine 6.94 (H) 0.55 - 1.02 mg/dL    BUN/Creatinine ratio 9 (L) 12 - 20      GFR est AA 7 (L) >60 ml/min/1.73m2    GFR est non-AA 6 (L) >60 ml/min/1.73m2    Calcium 7.8 (L) 8.5 - 10.1 mg/dL    Bilirubin, total 0.4 0.2 - 1.0 mg/dL    AST (SGOT) 49 (H) 15 - 37 U/L    ALT (SGPT) 75 12 - 78 U/L    Alk.  phosphatase 74 45 - 117 U/L    Protein, total 5.9 (L) 6.4 - 8.2 g/dL    Albumin 3.0 (L) 3.5 - 5.0 g/dL    Globulin 2.9 2.0 - 4.0 g/dL    A-G Ratio 1.0 (L) 1.1 - 2.2     GLUCOSE, POC    Collection Time: 03/01/21  7:27 AM   Result Value Ref Range    Glucose (POC) 198 (H) 65 - 100 mg/dL    Performed by Joel Vasquez        Results     Procedure Component Value Units Date/Time    CULTURE, RESPIRATORY/SPUTUM/BRONCH Dorthea Frederick [[id number]] Collected: 02/28/21 0930    Order Status: Completed Specimen: Sputum Updated: 02/28/21 1443    CULTURE, BLOOD #1 [[id number]] Collected: 02/27/21 0158    Order Status: Completed Specimen: Blood Updated: 02/27/21 0312    CULTURE, BLOOD #2 [[id number]] Collected: 02/27/21 0145    Order Status: Completed Specimen: Blood Updated: 02/27/21 0313    CULTURE, BLOOD, PAIRED [[id number]] Collected: 02/26/21 1430    Order Status: Completed Specimen: Blood Updated: 02/28/21 1042     Special Requests: No Special Requests        Culture result: No growth after 22 hours       CULTURE, BLOOD [[id number]] Collected: 02/26/21 1430    Order Status: Canceled Specimen: Blood     COVID-19 RAPID TEST [[id number]] Collected: 02/26/21 1336    Order Status: Completed Specimen: Nasopharyngeal Updated: 02/26/21 1409     Specimen source Nasopharyngeal        COVID-19 rapid test Not Detected        Comment: Rapid Abbott ID Now   Rapid NAAT:  The specimen is NEGATIVE for SARS-CoV-2, the novel coronavirus associated with COVID-19. Negative results should be treated as presumptive and, if inconsistent with clinical signs and symptoms or necessary for patient management, should be tested with an alternative molecular assay. Negative results do not preclude SARS-CoV-2 infection and should not be used as the sole basis for patient management decisions. This test has been authorized by the FDA under   an Emergency Use Authorization (EUA) for use by authorized laboratories.  Fact sheet for Healthcare Providers: iTendency.uy Fact sheet for Patients: iTendency.uy   Methodology: Isothermal Nucleic Acid Amplification                Labs:     Recent Labs     03/01/21  0558 02/28/21  0430   WBC 6.3 8. 4   HGB 10.5* 10.6*   HCT 32.9* 33.2*   * 139*     Recent Labs     03/01/21  0558 02/28/21 0430 02/27/21 1815 02/27/21  1645   * 136 138  --    K 5.3* 4.7 3.6  --    CL 97 97 99  --    CO2 25 27 29  --    BUN 61* 39* 21*  --    CREA 6.94* 5.38* 2.97*  --    * 125* 105*  --    CA 7.8* 8.4* 8.9  --    MG  --   --   --  2.1   PHOS  --  5.3* 2.9  --      Recent Labs     03/01/21 0558 02/28/21 0430 02/27/21 1815 02/27/21  1300   ALT 75 80*  --  113*   AP 74 71  --  97   TBILI 0.4 0.4  --  0.6   TP 5.9* 5.9*  --  6.9   ALB 3.0* 3.2* 3.9 3.5   GLOB 2.9 2.7  --  3.4     No results for input(s): INR, PTP, APTT, INREXT, INREXT in the last 72 hours. No results for input(s): FE, TIBC, PSAT, FERR in the last 72 hours.    No results found for: FOL, RBCF   Recent Labs     03/01/21  0535 02/28/21  0425   PH 7.42 7.51*   PCO2 41 33*   PO2 237* 395*     Recent Labs     02/26/21  1354   TROIQ <0.05     No results found for: CHOL, CHOLX, CHLST, CHOLV, HDL, HDLP, LDL, LDLC, DLDLP, TGLX, TRIGL, TRIGP, CHHD, CHHDX  Lab Results   Component Value Date/Time    Glucose (POC) 198 (H) 03/01/2021 07:27 AM    Glucose (POC) 146 (H) 02/28/2021 09:50 PM    Glucose (POC) 104 (H) 02/28/2021 04:04 PM    Glucose (POC) 103 (H) 02/28/2021 10:45 AM    Glucose (POC) 130 (H) 02/28/2021 04:56 AM     No results found for: COLOR, APPRN, SPGRU, REFSG, YESY, PROTU, GLUCU, KETU, BILU, UROU, CARTER, LEUKU, GLUKE, EPSU, BACTU, WBCU, RBCU, CASTS, UCRY      Assessment:     Acute hypoxemic respiratory failure on ventilator 50% O2   severe symptomatic bradycardia on Toprol urinary sphincter  End-stage renal disease on hemodialysis at home  Hypotensive  Goodpasture's syndrome  Fluid overload  Hyperkalemia  Anemia of chronic disease      Plan:   On IV Solu-Medrol 40 mg IV every 8 hours   Protonix 40 mg daily  Cardiology consult for permanent pacemaker  D/c  dobutamine and Levophed  On vancomycin and IV Zosyn    Follow-up with nephrology cardiology and pulmonologist    Overall prognosis very poor      Discussed with the cardiology and the nephrology    I spent critical care time 30 minutes independently        Current Facility-Administered Medications:     hydrOXYzine pamoate (VISTARIL) capsule 50 mg, 50 mg, Oral, Q6H PRN, Deonte Loya MD, 50 mg at 02/27/21 0528    [START ON 3/2/2021] VANCOMYCIN INFORMATION NOTE, , Other, ONCE, Kim George MD    VANCOMYCIN INFORMATION NOTE 1 Each, 1 Each, Other, Rx Dosing/Monitoring, Deonte Loya MD    DOBUTamine (DOBUTREX) 500 mg/250 mL (2,000 mcg/mL) infusion, 5 mcg/kg/min, IntraVENous, CONTINUOUS, Janeen Brooks MD, Stopped at 02/28/21 1307    NOREPINephrine (LEVOPHED) 16,000 mcg in dextrose 5% 250 mL infusion, 2-16 mcg/min, IntraVENous, TITRATE, Janina George MD    insulin lispro (HUMALOG) injection, , SubCUTAneous, AC&HS, Deonte Loya MD, 3 Units at 03/01/21 0730    glucose chewable tablet 16 g, 4 Tab, Oral, PRN, Kim George MD    dextrose (D50W) injection syrg 12.5-25 g, 25-50 mL, IntraVENous, PRN, Janina George MD    glucagon (GLUCAGEN) injection 1 mg, 1 mg, IntraMUSCular, PRN, Deonte Loya MD    [Held by provider] azaTHIOprine (IMURAN) tablet 25 mg, 25 mg, Oral, DAILY, Janeth Cao MD, Stopped at 02/28/21 0900    propofol (DIPRIVAN) 10 mg/mL infusion, 0-50 mcg/kg/min, IntraVENous, TITRATE, Kim George MD, Stopped at 03/01/21 0815    metoprolol (LOPRESSOR) injection 5 mg, 5 mg, IntraVENous, Q6H PRN, Janeen Brooks MD, 5 mg at 02/27/21 1646    methylPREDNISolone (PF) (SOLU-MEDROL) injection 40 mg, 40 mg, IntraVENous, Q8H, Kim George MD, 40 mg at 03/01/21 0547    vitamin B complex capsule 1 Cap, 1 Cap, Oral, DAILY, Kim George MD, 1 Cap at 02/28/21 7698    pantoprazole (PROTONIX) tablet 40 mg, 40 mg, Oral, DAILY, Kim George MD, 40 mg at 03/01/21 0941    sevelamer carbonate (RENVELA) tab 800 mg, 800 mg, Oral, TID WITH Ara Hanna MD, 800 mg at 03/01/21 9045    acetaminophen (TYLENOL) tablet 650 mg, 650 mg, Oral, Q6H PRN **OR** acetaminophen (TYLENOL) suppository 650 mg, 650 mg, Rectal, Q6H PRN, Tim George MD    polyethylene glycol (MIRALAX) packet 17 g, 17 g, Oral, DAILY PRN, Tim George MD    ondansetron (ZOFRAN ODT) tablet 4 mg, 4 mg, Oral, Q8H PRN **OR** ondansetron (ZOFRAN) injection 4 mg, 4 mg, IntraVENous, Q6H PRN, Kim George MD    piperacillin-tazobactam (ZOSYN) 3.375 g in 0.9% sodium chloride (MBP/ADV) 100 mL MBP, 3.375 g, IntraVENous, Q12H, Kim George MD, Last Rate: 200 mL/hr at 03/01/21 0940, 3.375 g at 03/01/21 0940

## 2021-03-01 NOTE — ROUTINE PROCESS
Called Dr Robbie Enriquez and left message re: pt's elevated bp. Awaiting return call. [address]. Pt's persistent bp elevation, no other s/ s noted, orders were received and will see pt at bedside today again.

## 2021-03-01 NOTE — PROGRESS NOTES
Renal Daily Progress Note    Admit Date: 2/26/2021      Subjective:   Propofol has been discontinued and she was just extubated when I saw her this morning. She is comfortable in bed. She denies shortness of breath. She denies chest pain or palpitation.       Current Facility-Administered Medications   Medication Dose Route Frequency    hydrALAZINE (APRESOLINE) 20 mg/mL injection 10 mg  10 mg IntraVENous ONCE    hydrALAZINE (APRESOLINE) 20 mg/mL injection 10 mg  10 mg IntraVENous Q6H PRN    hydrOXYzine pamoate (VISTARIL) capsule 50 mg  50 mg Oral Q6H PRN    [START ON 3/2/2021] VANCOMYCIN INFORMATION NOTE   Other ONCE    VANCOMYCIN INFORMATION NOTE 1 Each  1 Each Other Rx Dosing/Monitoring    DOBUTamine (DOBUTREX) 500 mg/250 mL (2,000 mcg/mL) infusion  5 mcg/kg/min IntraVENous CONTINUOUS    NOREPINephrine (LEVOPHED) 16,000 mcg in dextrose 5% 250 mL infusion  2-16 mcg/min IntraVENous TITRATE    insulin lispro (HUMALOG) injection   SubCUTAneous AC&HS    glucose chewable tablet 16 g  4 Tab Oral PRN    dextrose (D50W) injection syrg 12.5-25 g  25-50 mL IntraVENous PRN    glucagon (GLUCAGEN) injection 1 mg  1 mg IntraMUSCular PRN    [Held by provider] azaTHIOprine (IMURAN) tablet 25 mg  25 mg Oral DAILY    propofol (DIPRIVAN) 10 mg/mL infusion  0-50 mcg/kg/min IntraVENous TITRATE    metoprolol (LOPRESSOR) injection 5 mg  5 mg IntraVENous Q6H PRN    methylPREDNISolone (PF) (SOLU-MEDROL) injection 40 mg  40 mg IntraVENous Q8H    vitamin B complex capsule 1 Cap  1 Cap Oral DAILY    pantoprazole (PROTONIX) tablet 40 mg  40 mg Oral DAILY    sevelamer carbonate (RENVELA) tab 800 mg  800 mg Oral TID WITH MEALS    acetaminophen (TYLENOL) tablet 650 mg  650 mg Oral Q6H PRN    Or    acetaminophen (TYLENOL) suppository 650 mg  650 mg Rectal Q6H PRN    polyethylene glycol (MIRALAX) packet 17 g  17 g Oral DAILY PRN    ondansetron (ZOFRAN ODT) tablet 4 mg  4 mg Oral Q8H PRN    Or    ondansetron (ZOFRAN) injection 4 mg  4 mg IntraVENous Q6H PRN    piperacillin-tazobactam (ZOSYN) 3.375 g in 0.9% sodium chloride (MBP/ADV) 100 mL MBP  3.375 g IntraVENous Q12H        Review of Systems    Review of Systems   Respiratory: Negative for shortness of breath. Cardiovascular: Negative for chest pain. Neurological: Negative for headaches. Objective:     Patient Vitals for the past 8 hrs:   BP Temp Pulse Resp SpO2   03/01/21 1211  98.1 °F (36.7 °C)      03/01/21 1100 (!) 165/100 98 °F (36.7 °C) 81 18 98 %   03/01/21 1031     96 %   03/01/21 1012   83 12 100 %   03/01/21 0939 (!) 193/126       03/01/21 0900 (!) 177/119  80 17 100 %   03/01/21 0845 (!) 177/120       03/01/21 0800 (!) 149/97  69 16 100 %   03/01/21 0700  97.5 °F (36.4 °C)      03/01/21 0600 (!) 143/96  64 13 100 %   03/01/21 0551     100 %   03/01/21 0500 (!) 151/97  69 12 100 %   03/01/21 0445  97.7 °F (36.5 °C)        No intake/output data recorded. 02/27 1901 - 03/01 0700  In: 1083.9 [I.V.:1063.9]  Out: -     Physical Exam:   Physical Exam   Neck: No JVD present. Cardiovascular: Regular rhythm. Pulmonary/Chest: Breath sounds normal.   Abdominal: Soft. Bowel sounds are normal.   Musculoskeletal:         General: Edema present. Neurological: She is alert. Skin: Skin is warm. Right IJ permacath        XR CHEST PORT   Final Result   Stable appearance of endotracheal tube, right central dialysis   catheter, gastric tube, and temporary right ventricular pacing lead. Bilateral   pulmonary edema and small pleural effusions, consistent with clinical CHF,   subjectively slightly improved. No pneumothorax or other acute change. XR CHEST PORT   Final Result   Bilateral airspace disease/edema showing slight improvement compared   to the previous study. XR CHEST PORT   Final Result   Persistent bilateral airspace disease showing slight improvement   compared to earlier study.       XR CHEST PORT   Final Result   Bilateral central airspace disease/edema right greater than left   showing slight worsening on the right. CTA CHEST W OR W WO CONT   Final Result   The findings may be a combination of extensive pneumonia,   pneumonitis and edema. No evidence of PE      XR CHEST SNGL V   Final Result      XR CHEST PORT    (Results Pending)        Data Review   Recent Labs     03/01/21  0558 02/28/21  0430 02/27/21  1300   WBC 6.3 8.4 8.1   HGB 10.5* 10.6* 13.5   HCT 32.9* 33.2* 42.2   * 139* 156     Recent Labs     03/01/21  0558 02/28/21  0430 02/27/21  1815 02/27/21  1645 02/27/21  1300   * 136 138  --  132*   K 5.3* 4.7 3.6  --  6.2*   CL 97 97 99  --  99   CO2 25 27 29  --  22   * 125* 105*  --  247*   BUN 61* 39* 21*  --  61*   CREA 6.94* 5.38* 2.97*  --  6.88*   CA 7.8* 8.4* 8.9  --  7.9*   MG  --   --   --  2.1  --    PHOS  --  5.3* 2.9  --   --    ALB 3.0* 3.2* 3.9  --  3.5   ALT 75 80*  --   --  113*     No components found for: [place]  Recent Labs     03/01/21  0535 02/28/21  0425 02/27/21  1335   PH 7.42 7.51* 7.23*   PCO2 41 33* 54*   PO2 237* 395* 76   HCO3 26 28* 20*   FIO2 50.0 100.0 100.0     No results for input(s): INR, INREXT, INREXT in the last 72 hours. Assessment:           Active Problems:    PNA (pneumonia) (2/26/2021)      SOB (shortness of breath) (2/26/2021)    ESRD hemodialysis dependent  History of Goodpasture's syndrome  Anemia  Modest thrombocytopenia  Hypertension  Cardiomyopathy  Plan: We will start oral meds for her blood pressure  Hemodialysis tomorrow morning  Erythropoietin on dialysis if her blood pressures are under better control.

## 2021-03-01 NOTE — PROGRESS NOTES
Reason for Admission:   Fluid overload                   RUR Score:       18%             Plan for utilizing home health:      Patient declined. PCP: First and Last name:     Name of Practice:    Are you a current patient: Yes/No:    Approximate date of last visit:    Can you participate in a virtual visit with your PCP:                     Current Advanced Directive/Advance Care Plan:   Patient states her  is her POA. Advance Care Planning   Healthcare Decision Maker:       Primary Decision Maker: Chas Stafford - Spouse - [phone]      Transition of Care Plan:                    Writer met with patient at the bedside for DCP assessment. Patient lives in a tri-level home with her . Address on face sheet verified. Patient states she was independent at home, no DME. Patient does to home hemodialysis, patient states her  does this for her. Patient has 3 children, 2 son and 1 daughter. Patient states her son that lives in [place] comes to check on her every weekend. Patient denies any discharge needs at this time. CM will continue to follow. Anticipate home/family care.

## 2021-03-01 NOTE — PROGRESS NOTES
Progress Note      3/1/2021 8:55 AM  NAME: Javier Acosta   MRN:  [id number]   Admit Diagnosis: PNA (pneumonia) [J18.9]  SOB (shortness of breath) [R06.02]      Problem List:   1.  Incomplete database secondary to altered mental status  2.  Patient presents for shortness of breath   3.  Acute hypoxic respiratory failure requiring intubation  4.  Pneumonia  5.  Fluid overload, pulmonary edema  6.  Goodpasture's syndrome  7.  End-stage renal disease dialysis dependent  8. Cardiomyopathy (ejection fraction =17%)  9. Grade I (mild) diastolic dysfunction, or impaired relaxation  10. Mild pulmonary hypertension (RVSP =42 mmHg)    11. Valvular heart disease         11a. Mild to moderate tricuspid regurgitation         11b. Mild pulmonic regurgitation         11c. Mild to moderate mitral regurgitation  12. Profound bradycardia corrected  13.  Possible seizure disorder  14.  Gastroesophageal reflux disease (GERD)  15. Anemia  16. Thrombocytopenia  17. Electrolyte abnormalities (hyponatremia, hyperkalemia, and hypocalcemia)       Subjective: The patient is seen and examined in room 284. There were no acute cardiovascular events reported overnight. She remains intubated/sedated. Her echocardiogram reveals cardiomyopathy, diastolic dysfunction, mild pulmonary hypertension, and valvular heart disease. Nursing indicates the patient has not required pacing. Will consider discontinuing transvenous pacer. Discussed with RN events overnight.      Medications Personally Reviewed:    Current Facility-Administered Medications   Medication Dose Route Frequency    hydrOXYzine pamoate (VISTARIL) capsule 50 mg  50 mg Oral Q6H PRN    [START ON 3/2/2021] VANCOMYCIN INFORMATION NOTE   Other ONCE    VANCOMYCIN INFORMATION NOTE 1 Each  1 Each Other Rx Dosing/Monitoring    DOBUTamine (DOBUTREX) 500 mg/250 mL (2,000 mcg/mL) infusion  5 mcg/kg/min IntraVENous CONTINUOUS    NOREPINephrine (LEVOPHED) 16,000 mcg in dextrose 5% 250 mL infusion  2-16 mcg/min IntraVENous TITRATE   • insulin lispro (HUMALOG) injection   SubCUTAneous AC&HS   • glucose chewable tablet 16 g  4 Tab Oral PRN   • dextrose (D50W) injection syrg 12.5-25 g  25-50 mL IntraVENous PRN   • glucagon (GLUCAGEN) injection 1 mg  1 mg IntraMUSCular PRN   • [Held by provider] azaTHIOprine (IMURAN) tablet 25 mg  25 mg Oral DAILY   • propofol (DIPRIVAN) 10 mg/mL infusion  0-50 mcg/kg/min IntraVENous TITRATE   • metoprolol (LOPRESSOR) injection 5 mg  5 mg IntraVENous Q6H PRN   • methylPREDNISolone (PF) (SOLU-MEDROL) injection 40 mg  40 mg IntraVENous Q8H   • vitamin B complex capsule 1 Cap  1 Cap Oral DAILY   • pantoprazole (PROTONIX) tablet 40 mg  40 mg Oral DAILY   • sevelamer carbonate (RENVELA) tab 800 mg  800 mg Oral TID WITH MEALS   • acetaminophen (TYLENOL) tablet 650 mg  650 mg Oral Q6H PRN    Or   • acetaminophen (TYLENOL) suppository 650 mg  650 mg Rectal Q6H PRN   • polyethylene glycol (MIRALAX) packet 17 g  17 g Oral DAILY PRN   • ondansetron (ZOFRAN ODT) tablet 4 mg  4 mg Oral Q8H PRN    Or   • ondansetron (ZOFRAN) injection 4 mg  4 mg IntraVENous Q6H PRN   • piperacillin-tazobactam (ZOSYN) 3.375 g in 0.9% sodium chloride (MBP/ADV) 100 mL MBP  3.375 g IntraVENous Q12H           Objective:      Physical Exam:  Last 24hrs VS reviewed since prior progress note. Most recent are:    Visit Vitals  BP (!) 149/97 (BP 1 Location: Left arm, BP Patient Position: At rest;Supine)   Pulse 69   Temp 97.5 °F (36.4 °C)   Resp 16   Ht 5' 3\" (1.6 m)   Wt 48.3 kg (106 lb 7.7 oz)   SpO2 100%   BMI 18.86 kg/m²       Intake/Output Summary (Last 24 hours) at 3/1/2021 0855  Last data filed at 2/28/2021 1727  Gross per 24 hour   Intake 20 ml   Output —   Net 20 ml        Physical exam: Essentially unchanged    Data Review    Telemetry: Sinus rhythm without ventricular ectopy    Complete 2-D echocardiogram:   Final result   · LV: Calculated LVEF is 17%. Normal cavity size and wall  thickness. Severely reduced systolic function. Mild (grade 1) left ventricular diastolic dysfunction. · RV: Mildly dilated right ventricle. Moderately reduced systolic function. · MV: Moderate to severe mitral valve regurgitation is present. · TV: Mild to moderate tricuspid valve regurgitation is present. · PV: Mild pulmonic valve regurgitation is present. · PA: Mild pulmonary hypertension. Pulmonary arterial systolic pressure is 42 mmHg. · IVC: Mildly elevated central venous pressure (8 mmHg); IVC diameter is less than 21 mm and collapses less than 50% with respiration. · Pericardium: Trivial-to-small circumferential pericardial effusion. Lab Data Personally Reviewed:    Recent Labs     03/01/21 0558 02/28/21  0430   WBC 6.3 8.4   HGB 10.5* 10.6*   HCT 32.9* 33.2*   * 139*     No results for input(s): INR, PTP, APTT, INREXT in the last 72 hours. Recent Labs     03/01/21 0558 02/28/21  0430 02/27/21 1815 02/27/21  1645   * 136 138  --    K 5.3* 4.7 3.6  --    CL 97 97 99  --    CO2 25 27 29  --    BUN 61* 39* 21*  --    CREA 6.94* 5.38* 2.97*  --    * 125* 105*  --    CA 7.8* 8.4* 8.9  --    MG  --   --   --  2.1     Recent Labs     02/26/21  1354   TROIQ <0.05     No results found for: CHOL, CHOLX, CHLST, CHOLV, HDL, HDLP, LDL, LDLC, DLDLP, Cherie Екатерина, CHHD, CHHDX    Recent Labs     03/01/21  0558 02/28/21  0430 02/27/21 1815 02/27/21  1300   AP 74 71  --  97   TP 5.9* 5.9*  --  6.9   ALB 3.0* 3.2* 3.9 3.5   GLOB 2.9 2.7  --  3.4     Recent Labs     03/01/21  0535 02/28/21  0425   PH 7.42 7.51*   PCO2 41 33*   PO2 237* 395*           Assessment/Plan:   1. Continue ICU care  2. Continue to monitor serum electrolytes, and renal function  3. Continue current cardiovascular medications including insulin  4.   Consider discontinuing transvenous pacemaker within 24 hours     Neal Cano MD

## 2021-03-02 ENCOUNTER — APPOINTMENT (OUTPATIENT)
Dept: GENERAL RADIOLOGY | Age: 71
DRG: 208 | End: 2021-03-02
Attending: INTERNAL MEDICINE
Payer: MEDICARE

## 2021-03-02 LAB
ALBUMIN SERPL-MCNC: 3 G/DL (ref 3.5–5)
ALBUMIN/GLOB SERPL: 1 {RATIO} (ref 1.1–2.2)
ALP SERPL-CCNC: 64 U/L (ref 45–117)
ALT SERPL-CCNC: 73 U/L (ref 12–78)
ANION GAP SERPL CALC-SCNC: 13 MMOL/L (ref 5–15)
ARTERIAL PATENCY WRIST A: ABNORMAL
AST SERPL W P-5'-P-CCNC: 45 U/L (ref 15–37)
BACTERIA SPEC CULT: NORMAL
BASE DEFICIT BLDA-SCNC: 0.6 MMOL/L (ref 0–2)
BASOPHILS # BLD: 0 K/UL (ref 0–0.1)
BASOPHILS NFR BLD: 0 % (ref 0–1)
BDY SITE: ABNORMAL
BILIRUB SERPL-MCNC: 0.4 MG/DL (ref 0.2–1)
BUN SERPL-MCNC: 80 MG/DL (ref 6–20)
BUN/CREAT SERPL: 10 (ref 12–20)
CA-I BLD-MCNC: 7.9 MG/DL (ref 8.5–10.1)
CHLORIDE SERPL-SCNC: 95 MMOL/L (ref 97–108)
CO2 SERPL-SCNC: 24 MMOL/L (ref 21–32)
CREAT SERPL-MCNC: 8.27 MG/DL (ref 0.55–1.02)
DATE LAST DOSE: NORMAL
DIFFERENTIAL METHOD BLD: ABNORMAL
EOSINOPHIL # BLD: 0 K/UL (ref 0–0.4)
EOSINOPHIL NFR BLD: 0 % (ref 0–7)
ERYTHROCYTE [DISTWIDTH] IN BLOOD BY AUTOMATED COUNT: 16.4 % (ref 11.5–14.5)
FIO2 ON VENT: 21 %
GLOBULIN SER CALC-MCNC: 2.9 G/DL (ref 2–4)
GLUCOSE BLD STRIP.AUTO-MCNC: 108 MG/DL (ref 65–100)
GLUCOSE BLD STRIP.AUTO-MCNC: 128 MG/DL (ref 65–100)
GLUCOSE BLD STRIP.AUTO-MCNC: 133 MG/DL (ref 65–100)
GLUCOSE BLD STRIP.AUTO-MCNC: 148 MG/DL (ref 65–100)
GLUCOSE SERPL-MCNC: 111 MG/DL (ref 65–100)
GRAM STN SPEC: NORMAL
HCO3 BLDA-SCNC: 24 MMOL/L (ref 22–26)
HCT VFR BLD AUTO: 35.8 % (ref 35–47)
HGB BLD-MCNC: 11.5 G/DL (ref 11.5–16)
IMM GRANULOCYTES # BLD AUTO: 0 K/UL (ref 0–0.04)
IMM GRANULOCYTES NFR BLD AUTO: 0 % (ref 0–0.5)
LYMPHOCYTES # BLD: 0.3 K/UL (ref 0.8–3.5)
LYMPHOCYTES NFR BLD: 4 % (ref 12–49)
MCH RBC QN AUTO: 28.2 PG (ref 26–34)
MCHC RBC AUTO-ENTMCNC: 32.1 G/DL (ref 30–36.5)
MCV RBC AUTO: 87.7 FL (ref 80–99)
MONOCYTES # BLD: 0.2 K/UL (ref 0–1)
MONOCYTES NFR BLD: 2 % (ref 5–13)
NEUTS SEG # BLD: 7.2 K/UL (ref 1.8–8)
NEUTS SEG NFR BLD: 94 % (ref 32–75)
PCO2 BLDA: 35 MMHG (ref 35–45)
PERFORMED BY, TECHID: ABNORMAL
PH BLDA: 7.43 [PH] (ref 7.35–7.45)
PLATELET # BLD AUTO: 155 K/UL (ref 150–400)
PMV BLD AUTO: 11.5 FL (ref 8.9–12.9)
PO2 BLDA: 72 MMHG (ref 75–100)
POTASSIUM SERPL-SCNC: 5 MMOL/L (ref 3.5–5.1)
PROT SERPL-MCNC: 5.9 G/DL (ref 6.4–8.2)
RBC # BLD AUTO: 4.08 M/UL (ref 3.8–5.2)
REPORTED DOSE,DOSE: NORMAL UNITS
SAO2 % BLD: 95 %
SAO2% DEVICE SAO2% SENSOR NAME: ABNORMAL
SODIUM SERPL-SCNC: 132 MMOL/L (ref 136–145)
SPECIAL REQUESTS,SREQ: NORMAL
SPECIMEN SITE: ABNORMAL
VANCOMYCIN SERPL-MCNC: 7.4 UG/ML
WBC # BLD AUTO: 7.6 K/UL (ref 3.6–11)

## 2021-03-02 PROCEDURE — 65610000006 HC RM INTENSIVE CARE

## 2021-03-02 PROCEDURE — 36600 WITHDRAWAL OF ARTERIAL BLOOD: CPT

## 2021-03-02 PROCEDURE — 85025 COMPLETE CBC W/AUTO DIFF WBC: CPT

## 2021-03-02 PROCEDURE — 80202 ASSAY OF VANCOMYCIN: CPT

## 2021-03-02 PROCEDURE — 74011000258 HC RX REV CODE- 258: Performed by: FAMILY MEDICINE

## 2021-03-02 PROCEDURE — 36415 COLL VENOUS BLD VENIPUNCTURE: CPT

## 2021-03-02 PROCEDURE — 74011250637 HC RX REV CODE- 250/637: Performed by: FAMILY MEDICINE

## 2021-03-02 PROCEDURE — 82803 BLOOD GASES ANY COMBINATION: CPT

## 2021-03-02 PROCEDURE — 93005 ELECTROCARDIOGRAM TRACING: CPT

## 2021-03-02 PROCEDURE — 74011250636 HC RX REV CODE- 250/636: Performed by: FAMILY MEDICINE

## 2021-03-02 PROCEDURE — 82962 GLUCOSE BLOOD TEST: CPT

## 2021-03-02 PROCEDURE — 80053 COMPREHEN METABOLIC PANEL: CPT

## 2021-03-02 PROCEDURE — 74011250636 HC RX REV CODE- 250/636

## 2021-03-02 PROCEDURE — 71045 X-RAY EXAM CHEST 1 VIEW: CPT

## 2021-03-02 PROCEDURE — 74011636637 HC RX REV CODE- 636/637: Performed by: FAMILY MEDICINE

## 2021-03-02 PROCEDURE — 90935 HEMODIALYSIS ONE EVALUATION: CPT

## 2021-03-02 PROCEDURE — 74011250637 HC RX REV CODE- 250/637: Performed by: INTERNAL MEDICINE

## 2021-03-02 PROCEDURE — 92610 EVALUATE SWALLOWING FUNCTION: CPT

## 2021-03-02 RX ORDER — HEPARIN SODIUM 1000 [USP'U]/ML
3600 INJECTION, SOLUTION INTRAVENOUS; SUBCUTANEOUS
Status: DISCONTINUED | OUTPATIENT
Start: 2021-03-02 | End: 2021-03-08 | Stop reason: HOSPADM

## 2021-03-02 RX ORDER — HEPARIN SODIUM 1000 [USP'U]/ML
INJECTION, SOLUTION INTRAVENOUS; SUBCUTANEOUS
Status: COMPLETED
Start: 2021-03-02 | End: 2021-03-02

## 2021-03-02 RX ADMIN — PANTOPRAZOLE SODIUM 40 MG: 40 TABLET, DELAYED RELEASE ORAL at 09:03

## 2021-03-02 RX ADMIN — HYDRALAZINE HYDROCHLORIDE 25 MG: 25 TABLET ORAL at 09:03

## 2021-03-02 RX ADMIN — PIPERACILLIN AND TAZOBACTAM 3.38 G: 3; .375 INJECTION, POWDER, LYOPHILIZED, FOR SOLUTION INTRAVENOUS at 21:57

## 2021-03-02 RX ADMIN — HEPARIN SODIUM 3600 UNITS: 1000 INJECTION, SOLUTION INTRAVENOUS; SUBCUTANEOUS at 17:04

## 2021-03-02 RX ADMIN — HYDRALAZINE HYDROCHLORIDE 25 MG: 25 TABLET ORAL at 21:57

## 2021-03-02 RX ADMIN — CARVEDILOL 6.25 MG: 3.12 TABLET, FILM COATED ORAL at 09:02

## 2021-03-02 RX ADMIN — SEVELAMER CARBONATE 800 MG: 800 TABLET, FILM COATED ORAL at 14:00

## 2021-03-02 RX ADMIN — PIPERACILLIN AND TAZOBACTAM 3.38 G: 3; .375 INJECTION, POWDER, LYOPHILIZED, FOR SOLUTION INTRAVENOUS at 09:06

## 2021-03-02 RX ADMIN — METHYLPREDNISOLONE SODIUM SUCCINATE 40 MG: 40 INJECTION, POWDER, FOR SOLUTION INTRAMUSCULAR; INTRAVENOUS at 05:34

## 2021-03-02 RX ADMIN — INSULIN LISPRO 3 UNITS: 100 INJECTION, SOLUTION INTRAVENOUS; SUBCUTANEOUS at 17:36

## 2021-03-02 RX ADMIN — AMLODIPINE BESYLATE 10 MG: 5 TABLET ORAL at 09:02

## 2021-03-02 RX ADMIN — HYDRALAZINE HYDROCHLORIDE 25 MG: 25 TABLET ORAL at 17:36

## 2021-03-02 RX ADMIN — CARVEDILOL 6.25 MG: 3.12 TABLET, FILM COATED ORAL at 17:36

## 2021-03-02 RX ADMIN — METHYLPREDNISOLONE SODIUM SUCCINATE 40 MG: 40 INJECTION, POWDER, FOR SOLUTION INTRAMUSCULAR; INTRAVENOUS at 14:46

## 2021-03-02 RX ADMIN — METHYLPREDNISOLONE SODIUM SUCCINATE 40 MG: 40 INJECTION, POWDER, FOR SOLUTION INTRAMUSCULAR; INTRAVENOUS at 21:57

## 2021-03-02 RX ADMIN — SEVELAMER CARBONATE 800 MG: 800 TABLET, FILM COATED ORAL at 17:36

## 2021-03-02 RX ADMIN — SEVELAMER CARBONATE 800 MG: 800 TABLET, FILM COATED ORAL at 09:02

## 2021-03-02 RX ADMIN — VANCOMYCIN HYDROCHLORIDE 1000 MG: 1 INJECTION, POWDER, LYOPHILIZED, FOR SOLUTION INTRAVENOUS at 18:07

## 2021-03-02 NOTE — PROGRESS NOTES
Temporary pacemaker and sheath removed by Dr. Glorious Snellen at 8406. Pressure held for 15 minutes and site dressed with quick clot and transparent dressing. Site is clean, dry, and soft with no discoloration. Skin distal to site appropriate for race with capillary refill <3 seconds. Pedal pulse palpable 2+.

## 2021-03-02 NOTE — PROGRESS NOTES
Problem: Dysphagia (Adult)  Goal: *Acute Goals and Plan of Care (Insert Text)  Description: Speech Therapy Goals  Initiated 3/2/2021  -Patient will tolerate regular diet with thin liquids without signs/symptoms of aspiration given minimal cues within 7 day(s). [ ] Not met  [ ]  MET   [ ] Progressing  [ ] Fanny Cole  -Patient will demonstrate understanding of swallow safety precautions and aspiration precautions, diet recs with no cues within 7 day(s). [ ] Not met  [ ]  MET   [ ] Progressing  [ ] Discontinue  Outcome: Not Met   SPEECH [facility]  Patient: Zuleyka Jimenez (76 y.o. female)  Date: 3/2/2021  Primary Diagnosis: PNA (pneumonia) [J18.9]  SOB (shortness of breath) [R06.02]  Procedure(s) (LRB):  INSERT TEMPORARY PACEMAKER (N/A) 3 Days Post-Op   Precautions: aspiration       ASSESSMENT :  Based on the objective data described below, the patient presents with min oral dysphagia, otherwise oropharyngeal swallow function appears WFL. Oral phase c/b reduced efficacy of mastication. Pharyngeal phase appears WFL. Pt with 02 sats 97-99% throughout on RA. No overt s/s aspiration throughout. Pt was extubated 3-1-2021 ~1030a. Pt noted to be disoriented to location/time and nsg was made aware. Pt admitted with pna-pulmonary edema, SOB, COVID/FLU NEGATIVE. Patient will benefit from skilled intervention to address the above impairments. Patients rehabilitation potential is considered to be Good     PLAN :  Recommendations and Planned Interventions: At this time, recommend cont current diet of regular/thin consistency with restrictions as per MD.  Recommend SLP follow up x 1 to ensure diet tolerance and sw safety, cog-ling assessment if/as indicated pending pt's progress. Pt reports change in vocal quality, which may be related to recent intubation. Pt aware of recs to monitor and seek ENT assessment if vocal quality does not return to baseline. Frequency/Duration: Patient will be followed by speech-language pathology 1 time a week to address goals. Discharge Recommendations: Home with  pending PT/OT assessments. SUBJECTIVE:   Patient alert, agreeable, pleasant, reports not liking foods on current meal tray. OBJECTIVE:     CXR Results  (Last 48 hours)                 03/02/21 0355  XR CHEST PORT Final result    Narrative:  Chest single view. Comparison single view chest March 1, 2021       ET tube and NG tube have been removed. Stable right-sided permacath. Unchanged   appearance for the lungs. No gross interstitial or alveolar pulmonary edema. Cardiac and mediastinal structures unchanged. No pneumothorax or sizable pleural   effusion. 03/01/21 0320  XR CHEST PORT Final result    Impression:  Stable appearance of endotracheal tube, right central dialysis   catheter, gastric tube, and temporary right ventricular pacing lead. Bilateral   pulmonary edema and small pleural effusions, consistent with clinical CHF,   subjectively slightly improved. No pneumothorax or other acute change. Narrative:  Portable chest, 0318 hours, compared with 2/28/2021. Past Medical History:   Diagnosis Date    Chronic kidney disease     Heart failure ([facility] [address]    History reviewed. No pertinent surgical history. Prior Level of Function/Home Situation: pt reportedly lives with , denies  dysphagia     Diet prior to admission: regular/thin  Current Diet:  regular/thin, renal, carb consistent 1200kCal diet. Cognitive and Communication Status:  Neurologic State: Alert  Orientation Level: Disoriented to place, Disoriented to situation, Disoriented to time, Oriented to person  Cognition: Follows commands     Swallowing Evaluation:   Oral Assessment:  Oral Assessment  Labial: No impairment  Dentition: Intact  Oral Hygiene: mildly white coating on tongue noted  Lingual: No impairment  Velum: No impairment  P.O.  Trials:  Patient Position: upright in bed  Vocal quality prior to P.O.: Breathy;Hoarse; Other (comment)(increased pitch)  Consistency Presented: Solid; Thin liquid  How Presented: Self-fed/presented;Spoon;Straw;Successive swallows     Bolus Acceptance: No impairment  Bolus Formation/Control: No impairment     Propulsion: No impairment  Oral Residue: None  Initiation of Swallow: No impairment  Laryngeal Elevation: Functional  Aspiration Signs/Symptoms: None  Pharyngeal Phase Characteristics: No impairment, issues, or problems      Oral Phase Severity: Minimal  Pharyngeal Phase Severity : No impairment  Voice:    Vocal Quality: Breathy;Hoarse; Other (comment)(increased pitch)      Pain:  Pain Scale 1: Numeric (0 - 10)  Pain Intensity 1: 0       After treatment:   Patient left in no apparent distress in bed, Call bell within reach, and Nursing notified    COMMUNICATION/EDUCATION:   Patient was educated regarding purpose of SLP assessment, POC, diet recs and sw safety precautions. Patient demonstrated Good understanding as evidenced by verbal responsiveness. The patient's plan of care including recommendations, planned interventions, and recommended diet changes were discussed with: Registered nurse. Patient/family have participated as able in goal setting and plan of care. Patient/family agree to work toward stated goals and plan of care.     Thank you for this referral.  Alessandro Ng M.S. CCC-SLP  Time Calculation: 12 mins

## 2021-03-02 NOTE — PROGRESS NOTES
Dialysis initiated via a patent Right upper chest catheter. No s/s of infection or skin breakdown noted at the site.

## 2021-03-02 NOTE — PROGRESS NOTES
General Daily Progress Note          Patient Name:   Zuleyka Jimenez       YOB: 1950       Age:  72 y.o.       Admit Date: 2/26/2021      Subjective:     Patient extubated this morning  Temporary pacemaker also removed                 Objective:     Visit Vitals  BP (!) 146/100 (BP 1 Location: Left upper arm, BP Patient Position: At rest)   Pulse 70   Temp 98 °F (36.7 °C)   Resp 12   Ht 5' 3\" (1.6 m)   Wt 48 kg (105 lb 13.1 oz)   SpO2 98%   BMI 18.75 kg/m²        Recent Results (from the past 24 hour(s))   GLUCOSE, POC    Collection Time: 03/01/21 11:35 AM   Result Value Ref Range    Glucose (POC) 128 (H) 65 - 100 mg/dL    Performed by Nicky Pelayo    PHOSPHORUS    Collection Time: 03/01/21 12:45 PM   Result Value Ref Range    Phosphorus 6.9 (H) 2.6 - 4.7 mg/dL   GLUCOSE, POC    Collection Time: 03/01/21  3:11 PM   Result Value Ref Range    Glucose (POC) 114 (H) 65 - 100 mg/dL    Performed by [address] [place] [address], POC    Collection Time: 03/01/21  8:52 PM   Result Value Ref Range    Glucose (POC) 127 (H) 65 - 100 mg/dL    Performed by Ramiro Palencia    BLOOD GAS, ARTERIAL    Collection Time: 03/02/21  4:36 AM   Result Value Ref Range    pH 7.43 7.35 - 7.45      PCO2 35 35 - 45 mmHg    PO2 72 (L) 75 - 100 mmHg    O2 SAT 95 (L) >95 %    BICARBONATE 24 22 - 26 mmol/L    BASE DEFICIT 0.6 0 - 2 mmol/L    O2 METHOD Room air      FIO2 21.0 %    Sample source Arterial      SITE Right Brachial      JOVITA'S TEST PASS     VANCOMYCIN, RANDOM    Collection Time: 03/02/21  5:30 AM   Result Value Ref Range    Vancomycin, random 7.4 ug/mL    Reported dose date Not provided      Reported dose: Not provided Units   CBC WITH AUTOMATED DIFF    Collection Time: 03/02/21  5:30 AM   Result Value Ref Range    WBC 7.6 3.6 - 11.0 K/uL    RBC 4.08 3.80 - 5.20 M/uL    HGB 11.5 11.5 - 16.0 g/dL    HCT 35.8 35.0 - 47.0 %    MCV 87.7 80.0 - 99.0 FL    MCH 28.2 26.0 - 34.0 PG    MCHC 32.1 30.0 - 36.5 g/dL    RDW 16.4 (H) 11.5 - 14.5 %    PLATELET 197 078 - 810 K/uL    MPV 11.5 8.9 - 12.9 FL    NEUTROPHILS 94 (H) 32 - 75 %    LYMPHOCYTES 4 (L) 12 - 49 %    MONOCYTES 2 (L) 5 - 13 %    EOSINOPHILS 0 0 - 7 %    BASOPHILS 0 0 - 1 %    IMMATURE GRANULOCYTES 0 0.0 - 0.5 %    ABS. NEUTROPHILS 7.2 1.8 - 8.0 K/UL    ABS. LYMPHOCYTES 0.3 (L) 0.8 - 3.5 K/UL    ABS. MONOCYTES 0.2 0.0 - 1.0 K/UL    ABS. EOSINOPHILS 0.0 0.0 - 0.4 K/UL    ABS. BASOPHILS 0.0 0.0 - 0.1 K/UL    ABS. IMM. GRANS. 0.0 0.00 - 0.04 K/UL    DF AUTOMATED     METABOLIC PANEL, COMPREHENSIVE    Collection Time: 03/02/21  5:30 AM   Result Value Ref Range    Sodium 132 (L) 136 - 145 mmol/L    Potassium 5.0 3.5 - 5.1 mmol/L    Chloride 95 (L) 97 - 108 mmol/L    CO2 24 21 - 32 mmol/L    Anion gap 13 5 - 15 mmol/L    Glucose 111 (H) 65 - 100 mg/dL    BUN 80 (H) 6 - 20 mg/dL    Creatinine 8.27 (H) 0.55 - 1.02 mg/dL    BUN/Creatinine ratio 10 (L) 12 - 20      GFR est AA 6 (L) >60 ml/min/1.73m2    GFR est non-AA 5 (L) >60 ml/min/1.73m2    Calcium 7.9 (L) 8.5 - 10.1 mg/dL    Bilirubin, total 0.4 0.2 - 1.0 mg/dL    AST (SGOT) 45 (H) 15 - 37 U/L    ALT (SGPT) 73 12 - 78 U/L    Alk. phosphatase 64 45 - 117 U/L    Protein, total 5.9 (L) 6.4 - 8.2 g/dL    Albumin 3.0 (L) 3.5 - 5.0 g/dL    Globulin 2.9 2.0 - 4.0 g/dL    A-G Ratio 1.0 (L) 1.1 - 2.2     GLUCOSE, POC    Collection Time: 03/02/21  8:44 AM   Result Value Ref Range    Glucose (POC) 128 (H) 65 - 100 mg/dL    Performed by Pawel Chavarria      [unfilled]      Review of Systems  Patient is alert awake denies any chest pain or shortness of breath nausea no vomiting      Physical Exam:      Constitutional: V alert awake answer all simple questions   HENT:   Head: Normocephalic and atraumatic. Eyes: Pupils are equal, round, and reactive to light. EOM are normal.   Cardiovascular: Normal rate, regular rhythm and normal heart sounds. Pulmonary/Chest: Decreased breath sounds   abdominal: Soft.  Bowel sounds are normal. There is no abdominal tenderness. There is no rebound and no guarding. Musculoskeletal: Normal range of motion. Neurological: Lethargic and sleepy    XR CHEST PORT   Final Result      XR CHEST PORT   Final Result   Stable appearance of endotracheal tube, right central dialysis   catheter, gastric tube, and temporary right ventricular pacing lead. Bilateral   pulmonary edema and small pleural effusions, consistent with clinical CHF,   subjectively slightly improved. No pneumothorax or other acute change. XR CHEST PORT   Final Result   Bilateral airspace disease/edema showing slight improvement compared   to the previous study. XR CHEST PORT   Final Result   Persistent bilateral airspace disease showing slight improvement   compared to earlier study. XR CHEST PORT   Final Result   Bilateral central airspace disease/edema right greater than left   showing slight worsening on the right. CTA CHEST W OR W WO CONT   Final Result   The findings may be a combination of extensive pneumonia,   pneumonitis and edema.  No evidence of PE      XR CHEST SNGL V   Final Result           Recent Results (from the past 24 hour(s))   GLUCOSE, POC    Collection Time: 03/01/21 11:35 AM   Result Value Ref Range    Glucose (POC) 128 (H) 65 - 100 mg/dL    Performed by [facility]    PHOSPHORUS    Collection Time: 03/01/21 12:45 PM   Result Value Ref Range    Phosphorus 6.9 (H) 2.6 - 4.7 mg/dL   GLUCOSE, POC    Collection Time: 03/01/21  3:11 PM   Result Value Ref Range    Glucose (POC) 114 (H) 65 - 100 mg/dL    Performed by [address] [place] [address], POC    Collection Time: 03/01/21  8:52 PM   Result Value Ref Range    Glucose (POC) 127 (H) 65 - 100 mg/dL    Performed by [facility]    BLOOD GAS, ARTERIAL    Collection Time: 03/02/21  4:36 AM   Result Value Ref Range    pH 7.43 7.35 - 7.45      PCO2 35 35 - 45 mmHg    PO2 72 (L) 75 - 100 mmHg    O2 SAT 95 (L) >95 %    BICARBONATE 24 22 - 26 mmol/L    BASE DEFICIT 0.6 0 - 2 mmol/L    O2 METHOD Room air      FIO2 21.0 %    Sample source Arterial      SITE Right Brachial      JOVITA'S TEST PASS     VANCOMYCIN, RANDOM    Collection Time: 03/02/21  5:30 AM   Result Value Ref Range    Vancomycin, random 7.4 ug/mL    Reported dose date Not provided      Reported dose: Not provided Units   CBC WITH AUTOMATED DIFF    Collection Time: 03/02/21  5:30 AM   Result Value Ref Range    WBC 7.6 3.6 - 11.0 K/uL    RBC 4.08 3.80 - 5.20 M/uL    HGB 11.5 11.5 - 16.0 g/dL    HCT 35.8 35.0 - 47.0 %    MCV 87.7 80.0 - 99.0 FL    MCH 28.2 26.0 - 34.0 PG    MCHC 32.1 30.0 - 36.5 g/dL    RDW 16.4 (H) 11.5 - 14.5 %    PLATELET 282 171 - 109 K/uL    MPV 11.5 8.9 - 12.9 FL    NEUTROPHILS 94 (H) 32 - 75 %    LYMPHOCYTES 4 (L) 12 - 49 %    MONOCYTES 2 (L) 5 - 13 %    EOSINOPHILS 0 0 - 7 %    BASOPHILS 0 0 - 1 %    IMMATURE GRANULOCYTES 0 0.0 - 0.5 %    ABS. NEUTROPHILS 7.2 1.8 - 8.0 K/UL    ABS. LYMPHOCYTES 0.3 (L) 0.8 - 3.5 K/UL    ABS. MONOCYTES 0.2 0.0 - 1.0 K/UL    ABS. EOSINOPHILS 0.0 0.0 - 0.4 K/UL    ABS. BASOPHILS 0.0 0.0 - 0.1 K/UL    ABS. IMM. GRANS. 0.0 0.00 - 0.04 K/UL    DF AUTOMATED     METABOLIC PANEL, COMPREHENSIVE    Collection Time: 03/02/21  5:30 AM   Result Value Ref Range    Sodium 132 (L) 136 - 145 mmol/L    Potassium 5.0 3.5 - 5.1 mmol/L    Chloride 95 (L) 97 - 108 mmol/L    CO2 24 21 - 32 mmol/L    Anion gap 13 5 - 15 mmol/L    Glucose 111 (H) 65 - 100 mg/dL    BUN 80 (H) 6 - 20 mg/dL    Creatinine 8.27 (H) 0.55 - 1.02 mg/dL    BUN/Creatinine ratio 10 (L) 12 - 20      GFR est AA 6 (L) >60 ml/min/1.73m2    GFR est non-AA 5 (L) >60 ml/min/1.73m2    Calcium 7.9 (L) 8.5 - 10.1 mg/dL    Bilirubin, total 0.4 0.2 - 1.0 mg/dL    AST (SGOT) 45 (H) 15 - 37 U/L    ALT (SGPT) 73 12 - 78 U/L    Alk.  phosphatase 64 45 - 117 U/L    Protein, total 5.9 (L) 6.4 - 8.2 g/dL    Albumin 3.0 (L) 3.5 - 5.0 g/dL    Globulin 2.9 2.0 - 4.0 g/dL    A-G Ratio 1.0 (L) 1.1 - 2.2     GLUCOSE, POC    Collection Time: 03/02/21  8:44 AM   Result Value Ref Range    Glucose (POC) 128 (H) 65 - 100 mg/dL    Performed by [employer]        Results     Procedure Component Value Units Date/Time    CULTURE, RESPIRATORY/SPUTUM/BRONCH Consuelo Green [[id number]] Collected: 02/28/21 0930    Order Status: Completed Specimen: Sputum Updated: 03/01/21 1458     Special Requests: No Special Requests        GRAM STAIN Occasional WBCs seen               Rare Epithelial cells seen            No organisms seen        Culture result: No growth after 15 hours       CULTURE, BLOOD #1 [[id number]] Collected: 02/27/21 0158    Order Status: Completed Specimen: Blood Updated: 03/01/21 1211     Special Requests: No Special Requests        Culture result: No growth 1 day       CULTURE, BLOOD #2 [[id number]] Collected: 02/27/21 0145    Order Status: Completed Specimen: Blood Updated: 03/01/21 1211     Special Requests: No Special Requests        Culture result: No growth 1 day       CULTURE, BLOOD, PAIRED [[id number]] Collected: 02/26/21 1430    Order Status: Completed Specimen: Blood Updated: 03/01/21 1211     Special Requests: No Special Requests        Culture result: No growth 2 days       CULTURE, BLOOD [[id number]] Collected: 02/26/21 1430    Order Status: Canceled Specimen: Blood     COVID-19 RAPID TEST [[id number]] Collected: 02/26/21 1336    Order Status: Completed Specimen: Nasopharyngeal Updated: 02/26/21 1409     Specimen source Nasopharyngeal        COVID-19 rapid test Not Detected        Comment: Rapid Abbott ID Now   Rapid NAAT:  The specimen is NEGATIVE for SARS-CoV-2, the novel coronavirus associated with COVID-19. Negative results should be treated as presumptive and, if inconsistent with clinical signs and symptoms or necessary for patient management, should be tested with an alternative molecular assay. Negative results do not preclude SARS-CoV-2 infection and should not be used as the sole basis for patient management decisions.    This test has been authorized by the FDA under   an Emergency Use Authorization (EUA) for use by authorized laboratories. Fact sheet for Healthcare Providers: ConventionUpdate.co.nz Fact sheet for Patients: ConventionUpdate.co.nz   Methodology: Isothermal Nucleic Acid Amplification                Labs:     Recent Labs     03/02/21 0530 03/01/21 0558   WBC 7.6 6.3   HGB 11.5 10.5*   HCT 35.8 32.9*    143*     Recent Labs     03/02/21 0530 03/01/21  1245 03/01/21  0558 02/28/21  0430 02/27/21  1815 02/27/21  1645   *  --  133* 136 138  --    K 5.0  --  5.3* 4.7 3.6  --    CL 95*  --  97 97 99  --    CO2 24  --  25 27 29  --    BUN 80*  --  61* 39* 21*  --    CREA 8.27*  --  6.94* 5.38* 2.97*  --    *  --  192* 125* 105*  --    CA 7.9*  --  7.8* 8.4* 8.9  --    MG  --   --   --   --   --  2.1   PHOS  --  6.9*  --  5.3* 2.9  --      Recent Labs     03/02/21 0530 03/01/21 0558 02/28/21  0430   ALT 73 75 80*   AP 64 74 71   TBILI 0.4 0.4 0.4   TP 5.9* 5.9* 5.9*   ALB 3.0* 3.0* 3.2*   GLOB 2.9 2.9 2.7     No results for input(s): INR, PTP, APTT, INREXT, INREXT in the last 72 hours. No results for input(s): FE, TIBC, PSAT, FERR in the last 72 hours. No results found for: FOL, RBCF   Recent Labs     03/02/21 0436 03/01/21  0535   PH 7.43 7.42   PCO2 35 41   PO2 72* 237*     No results for input(s): CPK, CKNDX, TROIQ in the last 72 hours.     No lab exists for component: CPKMB  No results found for: CHOL, CHOLX, CHLST, CHOLV, HDL, HDLP, LDL, LDLC, DLDLP, TGLX, TRIGL, TRIGP, CHHD, CHHDX  Lab Results   Component Value Date/Time    Glucose (POC) 128 (H) 03/02/2021 08:44 AM    Glucose (POC) 127 (H) 03/01/2021 08:52 PM    Glucose (POC) 114 (H) 03/01/2021 03:11 PM    Glucose (POC) 128 (H) 03/01/2021 11:35 AM    Glucose (POC) 198 (H) 03/01/2021 07:27 AM     No results found for: COLOR, APPRN, SPGRU, REFSG, YESY, PROTU, GLUCU, Kriste Muss, BILU, UROU, CARTER, [facility], [address] [place] [address], EPSU, BACTU, WBCU, RBCU, CASTS, UCRY      Assessment:     Acute hypoxemic respiratory failure extubated this morning   severe symptomatic bradycardia on Toprol urinary sphincter  End-stage renal disease on hemodialysis at home  Hypotensive  Goodpasture's syndrome  Fluid overload  Hyperkalemia  Anemia of chronic disease      Plan:   On IV Solu-Medrol 40 mg IV every 8 hours   Protonix 40 mg daily  Cardiology consult for permanent pacemaker  D/c  dobutamine and Levophed  On vancomycin and IV Zosyn    Follow-up with nephrology cardiology and pulmonologist          We will do speech therapy consult and PT OT and repeat the labs in the morning    I spent critical care time 30 minutes independently        Current Facility-Administered Medications:     hydrALAZINE (APRESOLINE) 20 mg/mL injection 10 mg, 10 mg, IntraVENous, Q6H PRN, Lise SIMS MD    amLODIPine (NORVASC) tablet 10 mg, 10 mg, Oral, DAILY, Lise SIMS MD, 10 mg at 03/02/21 0902    hydrALAZINE (APRESOLINE) tablet 25 mg, 25 mg, Oral, TID, Lise SIMS MD, 25 mg at 03/02/21 0903    carvediloL (COREG) tablet 6.25 mg, 6.25 mg, Oral, BID WITH MEALS, Roxie Arthur MD, 6.25 mg at 03/02/21 0902    hydrOXYzine pamoate (VISTARIL) capsule 50 mg, 50 mg, Oral, Q6H PRN, Kim George MD, 50 mg at 02/27/21 0528    VANCOMYCIN INFORMATION NOTE 1 Each, 1 Each, Other, Rx Dosing/Monitoring, Kim George MD    DOBUTamine (DOBUTREX) 500 mg/250 mL (2,000 mcg/mL) infusion, 5 mcg/kg/min, IntraVENous, CONTINUOUS, Lise SIMS MD, Stopped at 02/28/21 1307    NOREPINephrine (LEVOPHED) 16,000 mcg in dextrose 5% 250 mL infusion, 2-16 mcg/min, IntraVENous, TITRATE, Kim George MD    insulin lispro (HUMALOG) injection, , SubCUTAneous, AC&HS, Kim George MD, Stopped at 03/01/21 1130    glucose chewable tablet 16 g, 4 Tab, Oral, PRN, Kim George MD    dextrose (D50W) injection syrg 12.5-25 g, 25-50 mL, IntraVENous, PRN, Ariel, Juanjose Adames MD    glucagon [facility]) injection 1 mg, 1 mg, IntraMUSCular, PRN, Yoshi Soliz MD    [Held by provider] azaTHIOprine (IMURAN) tablet 25 mg, 25 mg, Oral, DAILY, Griselda Loft, MD, Stopped at 02/28/21 0900    propofol (DIPRIVAN) 10 mg/mL infusion, 0-50 mcg/kg/min, IntraVENous, TITRATE, Juanjose George MD, Stopped at 03/01/21 0815    metoprolol (LOPRESSOR) injection 5 mg, 5 mg, IntraVENous, Q6H PRN, David Alejandra MD, 5 mg at 02/27/21 1646    methylPREDNISolone (PF) (SOLU-MEDROL) injection 40 mg, 40 mg, IntraVENous, Q8H, Yoshi Soliz MD, 40 mg at 03/02/21 0534    vitamin B complex capsule 1 Cap, 1 Cap, Oral, DAILY, Kim George MD, 1 Cap at 03/01/21 1648    pantoprazole (PROTONIX) tablet 40 mg, 40 mg, Oral, DAILY, Yoshi Soliz MD, 40 mg at 03/02/21 0903    sevelamer carbonate (RENVELA) tab 800 mg, 800 mg, Oral, TID WITH MEALS, Juanjose George MD, 800 mg at 03/02/21 0902    acetaminophen (TYLENOL) tablet 650 mg, 650 mg, Oral, Q6H PRN **OR** acetaminophen (TYLENOL) suppository 650 mg, 650 mg, Rectal, Q6H PRN, Juanjose George MD    polyethylene glycol (MIRALAX) packet 17 g, 17 g, Oral, DAILY PRN, Juanjose George MD    ondansetron (ZOFRAN ODT) tablet 4 mg, 4 mg, Oral, Q8H PRN **OR** ondansetron (ZOFRAN) injection 4 mg, 4 mg, IntraVENous, Q6H PRN, Kim George MD    piperacillin-tazobactam (ZOSYN) 3.375 g in 0.9% sodium chloride (MBP/ADV) 100 mL MBP, 3.375 g, IntraVENous, Q12H, Kim George MD, Last Rate: 200 mL/hr at 03/02/21 0906, 3.375 g at 03/02/21 9680

## 2021-03-02 NOTE — PROGRESS NOTES
Problem: Falls - Risk of  Goal: *Absence of Falls  Description: Document Dmitry Reed Fall Risk and appropriate interventions in the flowsheet.   Outcome: Progressing Towards Goal  Note: Fall Risk Interventions:       Mentation Interventions: Adequate sleep, hydration, pain control, Bed/chair exit alarm, Door open when patient unattended, Familiar objects from home, Increase mobility, More frequent rounding, Reorient patient, Toileting rounds, Update white board    Medication Interventions: Bed/chair exit alarm    Elimination Interventions: Bed/chair exit alarm, Call light in reach, Toileting schedule/hourly rounds

## 2021-03-02 NOTE — PROGRESS NOTES
Renal Daily Progress Note    Admit Date: 2/26/2021      Subjective:   Left heart catheterization done this morning. She feels well. She denies headache. She denies shortness of breath.       Current Facility-Administered Medications   Medication Dose Route Frequency    vancomycin (VANCOCIN) 1,000 mg in 0.9% sodium chloride 250 mL (VIAL-MATE)  1,000 mg IntraVENous ONCE    [START ON 3/4/2021] Vancomycin Random Level ordered for 3-4-21 AM   Other ONCE    hydrALAZINE (APRESOLINE) 20 mg/mL injection 10 mg  10 mg IntraVENous Q6H PRN    amLODIPine (NORVASC) tablet 10 mg  10 mg Oral DAILY    hydrALAZINE (APRESOLINE) tablet 25 mg  25 mg Oral TID    carvediloL (COREG) tablet 6.25 mg  6.25 mg Oral BID WITH MEALS    hydrOXYzine pamoate (VISTARIL) capsule 50 mg  50 mg Oral Q6H PRN    VANCOMYCIN INFORMATION NOTE 1 Each  1 Each Other Rx Dosing/Monitoring    DOBUTamine (DOBUTREX) 500 mg/250 mL (2,000 mcg/mL) infusion  5 mcg/kg/min IntraVENous CONTINUOUS    NOREPINephrine (LEVOPHED) 16,000 mcg in dextrose 5% 250 mL infusion  2-16 mcg/min IntraVENous TITRATE    insulin lispro (HUMALOG) injection   SubCUTAneous AC&HS    glucose chewable tablet 16 g  4 Tab Oral PRN    dextrose (D50W) injection syrg 12.5-25 g  25-50 mL IntraVENous PRN    glucagon (GLUCAGEN) injection 1 mg  1 mg IntraMUSCular PRN    [Held by provider] azaTHIOprine (IMURAN) tablet 25 mg  25 mg Oral DAILY    propofol (DIPRIVAN) 10 mg/mL infusion  0-50 mcg/kg/min IntraVENous TITRATE    metoprolol (LOPRESSOR) injection 5 mg  5 mg IntraVENous Q6H PRN    methylPREDNISolone (PF) (SOLU-MEDROL) injection 40 mg  40 mg IntraVENous Q8H    vitamin B complex capsule 1 Cap  1 Cap Oral DAILY    pantoprazole (PROTONIX) tablet 40 mg  40 mg Oral DAILY    sevelamer carbonate (RENVELA) tab 800 mg  800 mg Oral TID WITH MEALS    acetaminophen (TYLENOL) tablet 650 mg  650 mg Oral Q6H PRN    Or    acetaminophen (TYLENOL) suppository 650 mg  650 mg Rectal Q6H PRN    polyethylene glycol (MIRALAX) packet 17 g  17 g Oral DAILY PRN    ondansetron (ZOFRAN ODT) tablet 4 mg  4 mg Oral Q8H PRN    Or    ondansetron (ZOFRAN) injection 4 mg  4 mg IntraVENous Q6H PRN    piperacillin-tazobactam (ZOSYN) 3.375 g in 0.9% sodium chloride (MBP/ADV) 100 mL MBP  3.375 g IntraVENous Q12H        Review of Systems    Review of Systems   Respiratory: Negative for shortness of breath. Cardiovascular: Negative for chest pain. Neurological: Negative for headaches. Objective:     Patient Vitals for the past 8 hrs:   BP Temp Pulse Resp SpO2 Weight   03/02/21 1200 (!) 143/88  79 17 97 %    03/02/21 1100 (!) 147/102 98.3 °F (36.8 °C) 68 16 98 %    03/02/21 1000 (!) 145/98  77 17 96 %    03/02/21 0900 (!) 147/99  66 14 97 %    03/02/21 0851     98 %    03/02/21 0800 (!) 148/103  71 12 97 %    03/02/21 0700 (!) 145/93 98 °F (36.7 °C) 74 14 97 %    03/02/21 0600 (!) 146/100  70 12 97 %    03/02/21 0530      48 kg (105 lb 13.1 oz)   03/02/21 0500 (!) 149/100  74 12 96 %      No intake/output data recorded. 02/28 1901 - 03/02 0700  In: 1996.1 [P.O.:660; I.V.:1276.1]  Out: 1     Physical Exam:   Physical Exam   Neck: No JVD present. Cardiovascular: Regular rhythm. Pulmonary/Chest: Breath sounds normal.   Abdominal: Soft. Bowel sounds are normal.   Musculoskeletal:         General: Edema present. Neurological: She is alert. Skin: Skin is warm. Right IJ permacath        XR CHEST PORT   Final Result      XR CHEST PORT   Final Result   Stable appearance of endotracheal tube, right central dialysis   catheter, gastric tube, and temporary right ventricular pacing lead. Bilateral   pulmonary edema and small pleural effusions, consistent with clinical CHF,   subjectively slightly improved. No pneumothorax or other acute change. XR CHEST PORT   Final Result   Bilateral airspace disease/edema showing slight improvement compared   to the previous study.       XR CHEST PORT   Final Result   Persistent bilateral airspace disease showing slight improvement   compared to earlier study. XR CHEST PORT   Final Result   Bilateral central airspace disease/edema right greater than left   showing slight worsening on the right. CTA CHEST W OR W WO CONT   Final Result   The findings may be a combination of extensive pneumonia,   pneumonitis and edema. No evidence of PE      XR CHEST SNGL V   Final Result           Data Review   Recent Labs     03/02/21  0530 03/01/21  0558 02/28/21  0430   WBC 7.6 6.3 8.4   HGB 11.5 10.5* 10.6*   HCT 35.8 32.9* 33.2*    143* 139*     Recent Labs     03/02/21  0530 03/01/21  1245 03/01/21  0558 02/28/21  0430 02/27/21  1815 02/27/21  1645   *  --  133* 136 138  --    K 5.0  --  5.3* 4.7 3.6  --    CL 95*  --  97 97 99  --    CO2 24  --  25 27 29  --    *  --  192* 125* 105*  --    BUN 80*  --  61* 39* 21*  --    CREA 8.27*  --  6.94* 5.38* 2.97*  --    CA 7.9*  --  7.8* 8.4* 8.9  --    MG  --   --   --   --   --  2.1   PHOS  --  6.9*  --  5.3* 2.9  --    ALB 3.0*  --  3.0* 3.2* 3.9  --    ALT 73  --  75 80*  --   --      No components found for: [place]  Recent Labs     03/02/21  0436 03/01/21  0535 02/28/21  0425   PH 7.43 7.42 7.51*   PCO2 35 41 33*   PO2 72* 237* 395*   HCO3 24 26 28*   FIO2 21.0 50.0 100.0     No results for input(s): INR, INREXT, INREXT, INREXT in the last 72 hours. Assessment:           Active Problems:    PNA (pneumonia) (2/26/2021)      SOB (shortness of breath) (2/26/2021)    ESRD hemodialysis dependent  History of Goodpasture's syndrome  Anemia  Thrombocytopenia- resolved. Hypertension- under better control  Cardiomyopathy  Secondary hyperparathyroidism  Plan:     Hemodialysis today. No indication for LI at present. Volume removal as the blood pressure tolerates.   Sevelamer 800 mg 3 times daily with meals  She has been on home hemodialysis and would perhaps do better at least for the short-term with in center hemodialysis on discharge.

## 2021-03-02 NOTE — CONSULTS
PULMONARY ICU NOTE  [facility]    Name: Nathaly Gutierrez MRN: [id number]   : 1950 Hospital: [address] [place] [address]   Date: 3/2/2021  Admission date: 2021 Hospital Day: 5       HPI:     Hospital Problems  Never Reviewed          Codes Class Noted POA    PNA (pneumonia) ICD-10-CM: J18.9  ICD-9-CM: 486  2021 Unknown        SOB (shortness of breath) ICD-10-CM: R06.02  ICD-9-CM: 786.05  2021 Unknown                   [x] High complexity decision making was performed  [x] See my orders for details      Subjective/Initial History:     I was asked by Mark López MD to see Nathaly Gutierrez  a 72 y.o. African American female in consultation     Excerpts from admission 2021 or consult notes as follows:   61-year-old lady came in because of shortness of breath and dyspnea significant past medical history of end-stage renal disease on hemodialysis, Goodpasture syndrome anemia of chronic disease hypertension steroid-dependent, she was not able to dialysis at home because of generalized weakness and shortness of breath she has been on home dialysis no fever no chills she was put on 100% nonrebreather mask which has been switched to noninvasive ventilator not she is going for dialysis because of severe hypoxia so pulmonary critical care consult was called for further evaluation. She is a lifetime non-smoker.       No Known Allergies     MAR reviewed and pertinent medications noted or modified as needed     Current Facility-Administered Medications   Medication    hydrALAZINE (APRESOLINE) 20 mg/mL injection 10 mg    amLODIPine (NORVASC) tablet 10 mg    hydrALAZINE (APRESOLINE) tablet 25 mg    carvediloL (COREG) tablet 6.25 mg    hydrOXYzine pamoate (VISTARIL) capsule 50 mg    VANCOMYCIN INFORMATION NOTE 1 Each    DOBUTamine (DOBUTREX) 500 mg/250 mL (2,000 mcg/mL) infusion    NOREPINephrine (LEVOPHED) 16,000 mcg in dextrose 5% 250 mL infusion    insulin lispro (HUMALOG) injection    glucose chewable tablet 16 g    dextrose (D50W) injection syrg 12.5-25 g    glucagon (GLUCAGEN) injection 1 mg    [Held by provider] azaTHIOprine (IMURAN) tablet 25 mg    propofol (DIPRIVAN) 10 mg/mL infusion    metoprolol (LOPRESSOR) injection 5 mg    methylPREDNISolone (PF) (SOLU-MEDROL) injection 40 mg    vitamin B complex capsule 1 Cap    pantoprazole (PROTONIX) tablet 40 mg    sevelamer carbonate (RENVELA) tab 800 mg    acetaminophen (TYLENOL) tablet 650 mg    Or    acetaminophen (TYLENOL) suppository 650 mg    polyethylene glycol (MIRALAX) packet 17 g    ondansetron (ZOFRAN ODT) tablet 4 mg    Or    ondansetron (ZOFRAN) injection 4 mg    piperacillin-tazobactam (ZOSYN) 3.375 g in 0.9% sodium chloride (MBP/ADV) 100 mL MBP      Patient PCP: None  PMH:  has a past medical history of Chronic kidney disease and Heart failure ([facility] [address] PSH:   has no past surgical history on file. FHX: family history is not on file. SHX:  reports that she has never smoked. She has never used smokeless tobacco. She reports previous alcohol use. She reports previous drug use. ROS:  Unable to obtain as patient was lethargic and severely short of breath      Objective:     Vital Signs: Telemetry:    normal sinus rhythm Intake/Output:   Visit Vitals  BP (!) 146/100 (BP 1 Location: Left upper arm, BP Patient Position: At rest)   Pulse 70   Temp 98 °F (36.7 °C)   Resp 12   Ht 5' 3\" (1.6 m)   Wt 48 kg (105 lb 13.1 oz)   SpO2 97%   BMI 18.75 kg/m²       Temp (24hrs), Av.1 °F (36.7 °C), Min:97.9 °F (36.6 °C), Max:98.7 °F (37.1 °C)        O2 Device: Room air O2 Flow Rate (L/min): 0 l/min       Wt Readings from Last 4 Encounters:   21 48 kg (105 lb 13.1 oz)          Intake/Output Summary (Last 24 hours) at 3/2/2021 0832  Last data filed at 3/1/2021 2105  Gross per 24 hour   Intake 890 ml   Output 1 ml   Net 889 ml       Last shift:      No intake/output data recorded.   Last 3 shifts: 1901 - 03/02 0700  In: 1996.1 [P.O.:660; I.V.:1276.1]  Out: 1        Physical Exam:   Patient is intubated on ventilator  Physical Exam   Constitutional: She appears distressed. HENT:   Head: Normocephalic and atraumatic. Eyes: Pupils are equal, round, and reactive to light. EOM are normal.   Neck: Normal range of motion. Neck supple. Cardiovascular: Normal rate and regular rhythm. Pulmonary/Chest: She is in respiratory distress. She has rales. Abdominal: Soft. Musculoskeletal: Normal range of motion. Neurological: She is alert. Skin: Skin is warm. Labs:    Recent Labs     03/02/21  0530 03/01/21  0558 02/28/21  0430   WBC 7.6 6.3 8.4   HGB 11.5 10.5* 10.6*    143* 139*     Recent Labs     03/01/21  1245 03/01/21  0558 02/28/21  0430 02/27/21  1815 02/27/21  1645 02/27/21  1300   NA  --  133* 136 138  --  132*   K  --  5.3* 4.7 3.6  --  6.2*   CL  --  97 97 99  --  99   CO2  --  25 27 29  --  22   GLU  --  192* 125* 105*  --  247*   BUN  --  61* 39* 21*  --  61*   CREA  --  6.94* 5.38* 2.97*  --  6.88*   CA  --  7.8* 8.4* 8.9  --  7.9*   MG  --   --   --   --  2.1  --    PHOS 6.9*  --  5.3* 2.9  --   --    LAC  --   --   --   --   --  3.3*   ALB  --  3.0* 3.2* 3.9  --  3.5   ALT  --  75 80*  --   --  113*     Recent Labs     03/02/21  0436 03/01/21  0535 02/28/21  0425   PH 7.43 7.42 7.51*   PCO2 35 41 33*   PO2 72* 237* 395*   HCO3 24 26 28*   FIO2 21.0 50.0 100.0     No results for input(s): CPK, CKNDX, TROIQ in the last 72 hours. No lab exists for component: CPKMB  No results found for: BNPP, BNP   Lab Results   Component Value Date/Time    Culture result: No growth after 15 hours 02/28/2021 09:30 AM    Culture result: No growth 1 day 02/27/2021 01:58 AM    Culture result: No growth 1 day 02/27/2021 01:45 AM   No results found for: TSH, TSHEXT, TSHEXT    Imaging:    CXR Results  (Last 48 hours)               03/02/21 0355  XR CHEST PORT Final result    Narrative:  Chest single view. Comparison single view chest March 1, 2021       ET tube and NG tube have been removed. Stable right-sided permacath. Unchanged   appearance for the lungs. No gross interstitial or alveolar pulmonary edema. Cardiac and mediastinal structures unchanged. No pneumothorax or sizable pleural   effusion. 03/01/21 0320  XR CHEST PORT Final result    Impression:  Stable appearance of endotracheal tube, right central dialysis   catheter, gastric tube, and temporary right ventricular pacing lead. Bilateral   pulmonary edema and small pleural effusions, consistent with clinical CHF,   subjectively slightly improved. No pneumothorax or other acute change. Narrative:  Portable chest, 0318 hours, compared with 2/28/2021. Results from [place] encounter on 02/26/21   XR CHEST PORT    Narrative Chest single view. Comparison single view chest March 1, 2021    ET tube and NG tube have been removed. Stable right-sided permacath. Unchanged  appearance for the lungs. No gross interstitial or alveolar pulmonary edema. Cardiac and mediastinal structures unchanged. No pneumothorax or sizable pleural  effusion. XR CHEST PORT    Narrative Portable chest, 0318 hours, compared with 2/28/2021. Impression Stable appearance of endotracheal tube, right central dialysis  catheter, gastric tube, and temporary right ventricular pacing lead. Bilateral  pulmonary edema and small pleural effusions, consistent with clinical CHF,  subjectively slightly improved. No pneumothorax or other acute change. XR CHEST PORT    Narrative Upright radiograph chest 11:12 AM compared with 3/27/2021 at 7:19 AM.    INDICATION: Tachypnea, pneumonia. Right IJ central line remains in place. Heart size is stable. Extensive  bilateral airspace disease, right greater than left, in a predominantly central  distribution shows minimal improvement compared to the previous study.   Defibrillator pads project over the right upper chest and left lower chest. No  pneumothorax. Definite displaced fractures. Next      Impression Persistent bilateral airspace disease showing slight improvement  compared to earlier study. Results from [facility] encounter on 02/26/21   CTA CHEST W OR W WO CONT    Narrative CT dose reduction was achieved through use of a standardized protocol tailored  for this examination and automatic exposure control for dose modulation. Contrast study maximum intensity projections    There is a extensive diffuse lung disease. Dense consolidation is seen  throughout much of the right lower lobe and there is mild variable groundglass  opacification and small foci of dense consolidation scattered elsewhere  throughout much of each lung, right greater than left, with subpleural sparing,  mostly on the LEFT. Right middle lobe is disproportionately less involved, as is  the anterior left upper lobe. Central airways are open    Pulmonary arteries are densely opacified and show no filling defect or  distortion. Aorta shows normal dimensions, without dissection. No mediastinal or  hilar adenopathy. Small moderate symmetric pleural effusions. No pericardial  effusion. No significant abdominal finding. Body wall edema      Impression The findings may be a combination of extensive pneumonia,  pneumonitis and edema. No evidence of PE         IMPRESSION:   1. Acute hypoxic respiratory failure  2. History of Goodpasture syndrome  3. Severe cardiomyopathy ejection fraction about 17%  4. Fluid overload pulmonary edema  5. End-stage renal disease on hemodialysis  6. Neutropenia and hyperkalemia  7. Anemia  8. Bradycardia  Body mass index is 18.75 kg/m². 9. Pt is requiring Drug therapy requiring intensive monitoring for toxicity  10.  Pt is unstable, unpredictable needing inpatient monitoring; is acutely ill and at high risk of sudden decline and decompensation with severe consequenses and continued end organ dysfunction and failure  11. Prognosis guarded       RECOMMENDATIONS/PLAN:     1. Extubated yesterday patient is on room air now  2. CAT scan of the chest shows pulmonary edema CHF with prior history of Goodpasture syndrome. 3. Agree with dialysis   4. Agree with Empiric IV antibiotics pending culture results she is Zosyn  5. Follow culture results   6. Patient is afebrile normal white count  7. 2D echo shows ejection fraction of 17% off aminophylline and dobutamine  8. Supplemental O2 to keep sats > 93%  9. Aspiration precautions  10. Labs to follow electrolytes, renal function and and blood counts  11. Glucose monitoring and SSI  12. Bronchial hygiene with respiratory therapy techniques, bronchodilators  13.  DVT, SUP prophylaxis       ·           Barrera Siu MD

## 2021-03-02 NOTE — PROGRESS NOTES
The vancomycin level this morning was 7.4. I have placed an order for vancomycin 1gm to be given after dialysis today. I have ordered a random vancomycin level to be drawn Thursday morning with morning blood  work. Thank you for allowing pharmacy to participate in [facility]. We will continue to monitor and dose as appropriate.     Edwige Rizo Rp

## 2021-03-02 NOTE — ROUTINE PROCESS
at bedside, updated w/ pt's consent. Would like to talk to one of the MD regarding pt's diagnosis, tx and plan of care.  Will come visit in am.

## 2021-03-02 NOTE — ROUTINE PROCESS
Bedside and Verbal shift change report given to ROGER Pastrana 
 (oncoming nurse) by me (offgoing nurse). Report included the following information SBAR, Kardex, ED Summary, Procedure Summary, Intake/Output, MAR, Recent Results, Cardiac Rhythm sr and Alarm Parameters .

## 2021-03-02 NOTE — PROGRESS NOTES
Progress Note      3/2/2021 7:17 AM  NAME: Torin Juarez   MRN:  [id number]   Admit Diagnosis: PNA (pneumonia) [J18.9]  SOB (shortness of breath) [R06.02]      Problem List:   1.  Incomplete database secondary to altered mental status  2.  Patient presents for shortness of breath   3.  Acute hypoxic respiratory failure requiring intubation  4.  Pneumonia  5.  Fluid overload, pulmonary edema  6.  Goodpasture's syndrome  7.  End-stage renal disease dialysis dependent  8. Cardiomyopathy (ejection fraction =17%)  9. Grade I (mild) diastolic dysfunction, or impaired relaxation  10. Mild pulmonary hypertension (RVSP =42 mmHg)     11. Valvular heart disease         11a. Mild to moderate tricuspid regurgitation         11b. Mild pulmonic regurgitation         11c. Mild to moderate mitral regurgitation  12. Profound bradycardia corrected  13.  Possible seizure disorder  14.  Gastroesophageal reflux disease (GERD)  15. Anemia  16. Thrombocytopenia  17. Electrolyte abnormalities (hyponatremia, hyperkalemia, and hypocalcemia)       Subjective: The patient is seen and examined in room 284. There were no acute cardiovascular events reported overnight. Currently, she denies any cardiovascular complaints. Yesterday, the patient was started on antihypertensives including carvedilol, amlodipine, and hydralazine. The results of her echocardiogram including cardiomyopathy were shared with the patient. We also discussed the need for left heart catheterization. The patient will discuss the matter with her . Discussed with RN events overnight.      Medications Personally Reviewed:    Current Facility-Administered Medications   Medication Dose Route Frequency    hydrALAZINE (APRESOLINE) 20 mg/mL injection 10 mg  10 mg IntraVENous Q6H PRN    amLODIPine (NORVASC) tablet 10 mg  10 mg Oral DAILY    hydrALAZINE (APRESOLINE) tablet 25 mg  25 mg Oral TID    carvediloL (COREG) tablet 6.25 mg  6.25 mg Oral BID WITH MEALS    hydrOXYzine pamoate (VISTARIL) capsule 50 mg  50 mg Oral Q6H PRN    VANCOMYCIN INFORMATION NOTE 1 Each  1 Each Other Rx Dosing/Monitoring    DOBUTamine (DOBUTREX) 500 mg/250 mL (2,000 mcg/mL) infusion  5 mcg/kg/min IntraVENous CONTINUOUS    NOREPINephrine (LEVOPHED) 16,000 mcg in dextrose 5% 250 mL infusion  2-16 mcg/min IntraVENous TITRATE    insulin lispro (HUMALOG) injection   SubCUTAneous AC&HS    glucose chewable tablet 16 g  4 Tab Oral PRN    dextrose (D50W) injection syrg 12.5-25 g  25-50 mL IntraVENous PRN    glucagon (GLUCAGEN) injection 1 mg  1 mg IntraMUSCular PRN    [Held by provider] azaTHIOprine (IMURAN) tablet 25 mg  25 mg Oral DAILY    propofol (DIPRIVAN) 10 mg/mL infusion  0-50 mcg/kg/min IntraVENous TITRATE    metoprolol (LOPRESSOR) injection 5 mg  5 mg IntraVENous Q6H PRN    methylPREDNISolone (PF) (SOLU-MEDROL) injection 40 mg  40 mg IntraVENous Q8H    vitamin B complex capsule 1 Cap  1 Cap Oral DAILY    pantoprazole (PROTONIX) tablet 40 mg  40 mg Oral DAILY    sevelamer carbonate (RENVELA) tab 800 mg  800 mg Oral TID WITH MEALS    acetaminophen (TYLENOL) tablet 650 mg  650 mg Oral Q6H PRN    Or    acetaminophen (TYLENOL) suppository 650 mg  650 mg Rectal Q6H PRN    polyethylene glycol (MIRALAX) packet 17 g  17 g Oral DAILY PRN    ondansetron (ZOFRAN ODT) tablet 4 mg  4 mg Oral Q8H PRN    Or    ondansetron (ZOFRAN) injection 4 mg  4 mg IntraVENous Q6H PRN    piperacillin-tazobactam (ZOSYN) 3.375 g in 0.9% sodium chloride (MBP/ADV) 100 mL MBP  3.375 g IntraVENous Q12H           Objective:      Physical Exam:  Last 24hrs VS reviewed since prior progress note.  Most recent are:    Visit Vitals  BP (!) 145/99 (BP 1 Location: Left upper arm, BP Patient Position: At rest)   Pulse 83   Temp 97.9 °F (36.6 °C)   Resp 12   Ht 5' 3\" (1.6 m)   Wt 48 kg (105 lb 13.1 oz)   SpO2 96%   BMI 18.75 kg/m²       Intake/Output Summary (Last 24 hours) at 3/2/2021 6925  Last data filed at 3/1/2021 2105  Gross per 24 hour   Intake 932.2 ml   Output 1 ml   Net 931.2 ml        General Appearance: Well developed, in no acute respiratory distress. Chest: Lungs clear to auscultation bilaterally. Cardiovascular: JVP is not elevated, PMI is not displaced, normal intensity S1 and S2, without S3. Abdomen: Soft, non-tender, bowel sounds are active. Extremities: No edema bilaterally. Data Review    Telemetry: Sinus rhythm without ventricular ectopy    EKG:   []  No new EKG for review    Lab Data Personally Reviewed:    Recent Labs     03/02/21  0530 03/01/21  0558   WBC 7.6 6.3   HGB 11.5 10.5*   HCT 35.8 32.9*    143*     No results for input(s): INR, PTP, APTT, INREXT in the last 72 hours. Recent Labs     03/01/21  0558 02/28/21  0430 02/27/21  1815 02/27/21  1645   * 136 138  --    K 5.3* 4.7 3.6  --    CL 97 97 99  --    CO2 25 27 29  --    BUN 61* 39* 21*  --    CREA 6.94* 5.38* 2.97*  --    * 125* 105*  --    CA 7.8* 8.4* 8.9  --    MG  --   --   --  2.1     No results for input(s): CPK, CKNDX, TROIQ in the last 72 hours. No lab exists for component: CPKMB  No results found for: CHOL, CHOLX, CHLST, CHOLV, HDL, HDLP, LDL, LDLC, DLDLP, Kristal Mu, CHHD, CHHDX    Recent Labs     03/01/21  0558 02/28/21  0430 02/27/21  1815 02/27/21  1300   AP 74 71  --  97   TP 5.9* 5.9*  --  6.9   ALB 3.0* 3.2* 3.9 3.5   GLOB 2.9 2.7  --  3.4     Recent Labs     03/02/21  0436 03/01/21  0535   PH 7.43 7.42   PCO2 35 41   PO2 72* 237*           Assessment/Plan:   1. Continue ICU care  2. Continue to monitor serum electrolytes, and renal function  3.   Continue current cardiovascular medications including clozapine carvedilol, hydralazine, and insulin     Annemarie Mason MD

## 2021-03-02 NOTE — PROGRESS NOTES
OT eval order received and acknowledged. OT eval attempted at 2:56 pm however patient currently undergoing dialysis. Will continue to follow patient and attempt OT eval at a later time. Thank you.

## 2021-03-02 NOTE — PROGRESS NOTES
PT eval order received and acknowledged. PT eval attempted at 1440 however currently undergoing HD in the room. Will continue to follow patient and attempt PT eval at a later time. Thank you.

## 2021-03-03 LAB
ALBUMIN SERPL-MCNC: 2.8 G/DL (ref 3.5–5)
ALBUMIN/GLOB SERPL: 1 {RATIO} (ref 1.1–2.2)
ALP SERPL-CCNC: 55 U/L (ref 45–117)
ALT SERPL-CCNC: 75 U/L (ref 12–78)
ANION GAP SERPL CALC-SCNC: 11 MMOL/L (ref 5–15)
AST SERPL W P-5'-P-CCNC: 36 U/L (ref 15–37)
BASOPHILS # BLD: 0 K/UL (ref 0–0.1)
BASOPHILS NFR BLD: 0 % (ref 0–1)
BILIRUB SERPL-MCNC: 0.4 MG/DL (ref 0.2–1)
BUN SERPL-MCNC: 57 MG/DL (ref 6–20)
BUN/CREAT SERPL: 9 (ref 12–20)
CA-I BLD-MCNC: 8.2 MG/DL (ref 8.5–10.1)
CHLORIDE SERPL-SCNC: 95 MMOL/L (ref 97–108)
CO2 SERPL-SCNC: 27 MMOL/L (ref 21–32)
CREAT SERPL-MCNC: 6.21 MG/DL (ref 0.55–1.02)
DIFFERENTIAL METHOD BLD: ABNORMAL
EOSINOPHIL # BLD: 0 K/UL (ref 0–0.4)
EOSINOPHIL NFR BLD: 0 % (ref 0–7)
ERYTHROCYTE [DISTWIDTH] IN BLOOD BY AUTOMATED COUNT: 16.3 % (ref 11.5–14.5)
GLOBULIN SER CALC-MCNC: 2.8 G/DL (ref 2–4)
GLUCOSE BLD STRIP.AUTO-MCNC: 133 MG/DL (ref 65–100)
GLUCOSE BLD STRIP.AUTO-MCNC: 150 MG/DL (ref 65–100)
GLUCOSE BLD STRIP.AUTO-MCNC: 159 MG/DL (ref 65–100)
GLUCOSE SERPL-MCNC: 145 MG/DL (ref 65–100)
HCT VFR BLD AUTO: 36.5 % (ref 35–47)
HGB BLD-MCNC: 11.8 G/DL (ref 11.5–16)
IMM GRANULOCYTES # BLD AUTO: 0 K/UL (ref 0–0.04)
IMM GRANULOCYTES NFR BLD AUTO: 0 % (ref 0–0.5)
LYMPHOCYTES # BLD: 0.2 K/UL (ref 0.8–3.5)
LYMPHOCYTES NFR BLD: 4 % (ref 12–49)
MCH RBC QN AUTO: 28.5 PG (ref 26–34)
MCHC RBC AUTO-ENTMCNC: 32.3 G/DL (ref 30–36.5)
MCV RBC AUTO: 88.2 FL (ref 80–99)
MONOCYTES # BLD: 0.1 K/UL (ref 0–1)
MONOCYTES NFR BLD: 2 % (ref 5–13)
NEUTS SEG # BLD: 5.3 K/UL (ref 1.8–8)
NEUTS SEG NFR BLD: 94 % (ref 32–75)
PERFORMED BY, TECHID: ABNORMAL
PLATELET # BLD AUTO: 176 K/UL (ref 150–400)
PMV BLD AUTO: 11.5 FL (ref 8.9–12.9)
POTASSIUM SERPL-SCNC: 4.8 MMOL/L (ref 3.5–5.1)
PROT SERPL-MCNC: 5.6 G/DL (ref 6.4–8.2)
RBC # BLD AUTO: 4.14 M/UL (ref 3.8–5.2)
SODIUM SERPL-SCNC: 133 MMOL/L (ref 136–145)
WBC # BLD AUTO: 5.7 K/UL (ref 3.6–11)

## 2021-03-03 PROCEDURE — 86704 HEP B CORE ANTIBODY TOTAL: CPT

## 2021-03-03 PROCEDURE — 74011250637 HC RX REV CODE- 250/637: Performed by: FAMILY MEDICINE

## 2021-03-03 PROCEDURE — 87340 HEPATITIS B SURFACE AG IA: CPT

## 2021-03-03 PROCEDURE — 97161 PT EVAL LOW COMPLEX 20 MIN: CPT

## 2021-03-03 PROCEDURE — 97530 THERAPEUTIC ACTIVITIES: CPT

## 2021-03-03 PROCEDURE — 74011250636 HC RX REV CODE- 250/636: Performed by: FAMILY MEDICINE

## 2021-03-03 PROCEDURE — 74011636637 HC RX REV CODE- 636/637: Performed by: FAMILY MEDICINE

## 2021-03-03 PROCEDURE — 74011636637 HC RX REV CODE- 636/637: Performed by: INTERNAL MEDICINE

## 2021-03-03 PROCEDURE — 82962 GLUCOSE BLOOD TEST: CPT

## 2021-03-03 PROCEDURE — 36415 COLL VENOUS BLD VENIPUNCTURE: CPT

## 2021-03-03 PROCEDURE — 80053 COMPREHEN METABOLIC PANEL: CPT

## 2021-03-03 PROCEDURE — 65270000029 HC RM PRIVATE

## 2021-03-03 PROCEDURE — 74011250637 HC RX REV CODE- 250/637: Performed by: INTERNAL MEDICINE

## 2021-03-03 PROCEDURE — 85025 COMPLETE CBC W/AUTO DIFF WBC: CPT

## 2021-03-03 PROCEDURE — 86803 HEPATITIS C AB TEST: CPT

## 2021-03-03 PROCEDURE — 74011000258 HC RX REV CODE- 258: Performed by: FAMILY MEDICINE

## 2021-03-03 PROCEDURE — 86706 HEP B SURFACE ANTIBODY: CPT

## 2021-03-03 PROCEDURE — 97165 OT EVAL LOW COMPLEX 30 MIN: CPT

## 2021-03-03 RX ORDER — ISOSORBIDE DINITRATE 20 MG/1
20 TABLET ORAL 3 TIMES DAILY
Status: DISCONTINUED | OUTPATIENT
Start: 2021-03-03 | End: 2021-03-08 | Stop reason: HOSPADM

## 2021-03-03 RX ORDER — HYDRALAZINE HYDROCHLORIDE 25 MG/1
25 TABLET, FILM COATED ORAL 3 TIMES DAILY
Status: DISCONTINUED | OUTPATIENT
Start: 2021-03-03 | End: 2021-03-08 | Stop reason: HOSPADM

## 2021-03-03 RX ORDER — PREDNISONE 20 MG/1
20 TABLET ORAL 2 TIMES DAILY WITH MEALS
Status: DISCONTINUED | OUTPATIENT
Start: 2021-03-03 | End: 2021-03-08 | Stop reason: HOSPADM

## 2021-03-03 RX ADMIN — HYDRALAZINE HYDROCHLORIDE 25 MG: 25 TABLET ORAL at 08:39

## 2021-03-03 RX ADMIN — HYDRALAZINE HYDROCHLORIDE 25 MG: 25 TABLET ORAL at 17:25

## 2021-03-03 RX ADMIN — ISOSORBIDE DINITRATE 20 MG: 20 TABLET ORAL at 23:46

## 2021-03-03 RX ADMIN — PIPERACILLIN AND TAZOBACTAM 3.38 G: 3; .375 INJECTION, POWDER, LYOPHILIZED, FOR SOLUTION INTRAVENOUS at 23:47

## 2021-03-03 RX ADMIN — PANTOPRAZOLE SODIUM 40 MG: 40 TABLET, DELAYED RELEASE ORAL at 08:39

## 2021-03-03 RX ADMIN — AMLODIPINE BESYLATE 10 MG: 5 TABLET ORAL at 08:39

## 2021-03-03 RX ADMIN — PREDNISONE 20 MG: 20 TABLET ORAL at 17:25

## 2021-03-03 RX ADMIN — INSULIN LISPRO 3 UNITS: 100 INJECTION, SOLUTION INTRAVENOUS; SUBCUTANEOUS at 12:06

## 2021-03-03 RX ADMIN — HYDRALAZINE HYDROCHLORIDE 25 MG: 25 TABLET ORAL at 23:43

## 2021-03-03 RX ADMIN — SEVELAMER CARBONATE 800 MG: 800 TABLET, FILM COATED ORAL at 17:25

## 2021-03-03 RX ADMIN — METHYLPREDNISOLONE SODIUM SUCCINATE 40 MG: 40 INJECTION, POWDER, FOR SOLUTION INTRAMUSCULAR; INTRAVENOUS at 05:11

## 2021-03-03 RX ADMIN — PREDNISONE 20 MG: 20 TABLET ORAL at 08:50

## 2021-03-03 RX ADMIN — SEVELAMER CARBONATE 800 MG: 800 TABLET, FILM COATED ORAL at 12:07

## 2021-03-03 RX ADMIN — ISOSORBIDE DINITRATE 20 MG: 20 TABLET ORAL at 17:25

## 2021-03-03 RX ADMIN — SEVELAMER CARBONATE 800 MG: 800 TABLET, FILM COATED ORAL at 08:19

## 2021-03-03 RX ADMIN — Medication 1 CAPSULE: at 12:07

## 2021-03-03 RX ADMIN — PIPERACILLIN AND TAZOBACTAM 3.38 G: 3; .375 INJECTION, POWDER, LYOPHILIZED, FOR SOLUTION INTRAVENOUS at 08:40

## 2021-03-03 RX ADMIN — INSULIN LISPRO 3 UNITS: 100 INJECTION, SOLUTION INTRAVENOUS; SUBCUTANEOUS at 23:47

## 2021-03-03 NOTE — PROGRESS NOTES
Renal Daily Progress Note    Admit Date: 2/26/2021      Subjective:   She was seen this morning. She is sitting up in a chair denying shortness of breath. No cough. Appetite is decent.       Current Facility-Administered Medications   Medication Dose Route Frequency    predniSONE (DELTASONE) tablet 20 mg  20 mg Oral BID WITH MEALS    [START ON 3/4/2021] Vancomycin Random Level ordered for 3-4-21 AM   Other ONCE    heparin (porcine) 1,000 unit/mL injection 3,600 Units  3,600 Units Hemodialysis Q TU, TH & SAT    hydrALAZINE (APRESOLINE) 20 mg/mL injection 10 mg  10 mg IntraVENous Q6H PRN    amLODIPine (NORVASC) tablet 10 mg  10 mg Oral DAILY    hydrALAZINE (APRESOLINE) tablet 25 mg  25 mg Oral TID    carvediloL (COREG) tablet 6.25 mg  6.25 mg Oral BID WITH MEALS    hydrOXYzine pamoate (VISTARIL) capsule 50 mg  50 mg Oral Q6H PRN    VANCOMYCIN INFORMATION NOTE 1 Each  1 Each Other Rx Dosing/Monitoring    DOBUTamine (DOBUTREX) 500 mg/250 mL (2,000 mcg/mL) infusion  5 mcg/kg/min IntraVENous CONTINUOUS    NOREPINephrine (LEVOPHED) 16,000 mcg in dextrose 5% 250 mL infusion  2-16 mcg/min IntraVENous TITRATE    insulin lispro (HUMALOG) injection   SubCUTAneous AC&HS    glucose chewable tablet 16 g  4 Tab Oral PRN    dextrose (D50W) injection syrg 12.5-25 g  25-50 mL IntraVENous PRN    glucagon (GLUCAGEN) injection 1 mg  1 mg IntraMUSCular PRN    [Held by provider] azaTHIOprine (IMURAN) tablet 25 mg  25 mg Oral DAILY    propofol (DIPRIVAN) 10 mg/mL infusion  0-50 mcg/kg/min IntraVENous TITRATE    metoprolol (LOPRESSOR) injection 5 mg  5 mg IntraVENous Q6H PRN    vitamin B complex capsule 1 Cap  1 Cap Oral DAILY    pantoprazole (PROTONIX) tablet 40 mg  40 mg Oral DAILY    sevelamer carbonate (RENVELA) tab 800 mg  800 mg Oral TID WITH MEALS    acetaminophen (TYLENOL) tablet 650 mg  650 mg Oral Q6H PRN    Or    acetaminophen (TYLENOL) suppository 650 mg  650 mg Rectal Q6H PRN    polyethylene glycol (MIRALAX) packet 17 g  17 g Oral DAILY PRN    ondansetron (ZOFRAN ODT) tablet 4 mg  4 mg Oral Q8H PRN    Or    ondansetron (ZOFRAN) injection 4 mg  4 mg IntraVENous Q6H PRN    piperacillin-tazobactam (ZOSYN) 3.375 g in 0.9% sodium chloride (MBP/ADV) 100 mL MBP  3.375 g IntraVENous Q12H        Review of Systems    Review of Systems   Respiratory: Negative for shortness of breath. Cardiovascular: Negative for chest pain. Neurological: Negative for headaches. Objective:     Patient Vitals for the past 8 hrs:   BP Temp Pulse Resp SpO2   03/03/21 0822   (!) 53     03/03/21 0800   (!) 53     03/03/21 0700 (!) 145/96 97.5 °F (36.4 °C) 64 18 99 %   03/03/21 0600 (!) 143/92  66 16 97 %   03/03/21 0500 (!) 137/92  66 16 99 %   03/03/21 0400 (!) 137/92  63 16 98 %     No intake/output data recorded. 03/01 1901 - 03/03 0700  In: 200 [I.V.:200]  Out: -     Physical Exam:   Physical Exam   Neck: No JVD present. Cardiovascular: Regular rhythm. Pulmonary/Chest: Breath sounds normal.   Abdominal: Soft. Bowel sounds are normal.   Musculoskeletal:         General: No edema. Neurological: She is alert. Skin: Skin is warm. Right IJ permacath        XR CHEST PORT   Final Result      XR CHEST PORT   Final Result   Stable appearance of endotracheal tube, right central dialysis   catheter, gastric tube, and temporary right ventricular pacing lead. Bilateral   pulmonary edema and small pleural effusions, consistent with clinical CHF,   subjectively slightly improved. No pneumothorax or other acute change. XR CHEST PORT   Final Result   Bilateral airspace disease/edema showing slight improvement compared   to the previous study. XR CHEST PORT   Final Result   Persistent bilateral airspace disease showing slight improvement   compared to earlier study. XR CHEST PORT   Final Result   Bilateral central airspace disease/edema right greater than left   showing slight worsening on the right. CTA CHEST W OR W WO CONT   Final Result   The findings may be a combination of extensive pneumonia,   pneumonitis and edema. No evidence of PE      XR CHEST SNGL V   Final Result           Data Review   Recent Labs     03/03/21  0400 03/02/21  0530 03/01/21  0558   WBC 5.7 7.6 6.3   HGB 11.8 11.5 10.5*   HCT 36.5 35.8 32.9*    155 143*     Recent Labs     03/03/21  0400 03/02/21  0530 03/01/21  1245 03/01/21  0558   * 132*  --  133*   K 4.8 5.0  --  5.3*   CL 95* 95*  --  97   CO2 27 24  --  25   * 111*  --  192*   BUN 57* 80*  --  61*   CREA 6.21* 8.27*  --  6.94*   CA 8.2* 7.9*  --  7.8*   PHOS  --   --  6.9*  --    ALB 2.8* 3.0*  --  3.0*   ALT 75 73  --  75     No components found for: [place]  Recent Labs     03/02/21  0436 03/01/21  0535   PH 7.43 7.42   PCO2 35 41   PO2 72* 237*   HCO3 24 26   FIO2 21.0 50.0     No results for input(s): INR, INREXT, INREXT, INREXT in the last 72 hours. Assessment:           Active Problems:    PNA (pneumonia) (2/26/2021)      SOB (shortness of breath) (2/26/2021)    ESRD hemodialysis dependent  History of Goodpasture's syndrome  Anemia  Thrombocytopenia- resolved. Hypertension- under better control  Cardiomyopathy  Secondary hyperparathyroidism  Hyponatremia  Plan:   Hemodialysis on a Tuesday Thursday Saturday schedule. Will check hepatitis panel in preparation for in center hemodialysis. Discussed with patient regarding in center hemodialysis. She will talk to her . She has been on home hemodialysis and would perhaps do better at least for the short-term with in center hemodialysis on discharge. yes

## 2021-03-03 NOTE — PROGRESS NOTES
PHYSICAL THERAPY EVALUATION  Patient: Radha Chester (48 y.o. female)  Date: 3/3/2021  Primary Diagnosis: PNA (pneumonia) [J18.9]  SOB (shortness of breath) [R06.02]  Procedure(s) (LRB):  INSERT TEMPORARY PACEMAKER (N/A) 4 Days Post-Op   Precautions: falls       ASSESSMENT  Patient is a 72year old female, who came to the ED with increasing weakness, SOB, hypoxic with O2 saturation in 80s and admitted for PNA, SOB, negative for COVID and flu on 2/26/2021. Since admission patient has undergone a temporary pacemaker placement (was removed 3/2/2021), and was intubated 2/27/2021 and extubated 3/01/2021. PMH includes: ESRD on home dialysis, CKD, heart failure.     Based on the objective data described below, the patient presents with decreased standing balance with several minor LOB requiring CGA to min A to correct, decreased activity tolerance, generalized deconditioning, decreased generalized strength, increased need for A with amb and functional mobility/transfers. Patient semi supine in bed upon PT/OT arrival and agreeable to working with therapy. 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 Patient has 3 steps down to primary bed/bath and 10 steps to upstairs with both having L sided rails. Patient reports being independent for ADLs/IADLs PTA, ambulates with no AD. DME includes: tub transfer bench and rollator. Patient SBA bed mobility with increased time, SBA increased time sup -> sit and scooting, CGA sit <> stand transfers. Pt amb ~25 feet in room (ICU bed> commode >sink> bedside recliner) with no AD, gt belt, CGA to min A and hand held assist of 1-2 initially with balance improving throughout session. Pt able to stand at sink x 5 min to perform self care activities with OT, close CGA required due to unsteadiness noted.  Patient would benefit from continued skilled PT services to address above deficits and improve safety and independence with self care and functional mobility/transfers. Recommend discharge to home with [facility] and family care when medically appropriate. Current Level of Function Impacting Discharge (mobility/balance): SBA to close CGA/min A for standing mobility     Other factors to consider for discharge: good family support, acute medical state      PLAN :  Recommendations and Planned Interventions: bed mobility training, transfer training, gait training, therapeutic exercises, patient and family training/education and therapeutic activities      Recommend with staff: amb in room with close CGA and gt belt for safety     Frequency/Duration: Patient will be followed by physical therapy:  4 times a week to address goals. Recommendation for discharge: (in order for the patient to meet his/her long term goals)  HHPT with family care    This discharge recommendation:  Has been made in collaboration with the attending provider and/or case management    IF patient discharges home will need the following DME: none         SUBJECTIVE:   Patient stated im ready to go home.     OBJECTIVE DATA SUMMARY:   HISTORY:    Past Medical History:   Diagnosis Date    Chronic kidney disease     Heart failure ([facility] [address]    History reviewed. No pertinent surgical history.     Home Situation  Home Environment: Private residence  # Steps to Enter: 4  Rails to Enter: Yes  Hand Rails : Left  Wheelchair Ramp: No  One/Two Story Residence: Split level  # of Interior Steps: 10  Interior Rails: Left  Lift Chair Available: No  Living Alone: No  Support Systems: Spouse/Significant Other/Partner, Family member(s)  Patient Expects to be Discharged to[de-identified] Private residence  Current DME Used/Available at Home: Walker, rollator, Tub transfer bench  Tub or Shower Type: Tub/Shower combination    EXAMINATION/PRESENTATION/DECISION MAKING:   Critical Behavior:  Neurologic State: Alert  Orientation Level: Oriented to person, Oriented to place, Disoriented to time  Cognition: Follows commands  Safety/Judgement: Fall prevention, Home safety  Hearing: Auditory  Auditory Impairment: None  Skin:  Intact where visible   Edema: none noted   Range Of Motion:  AROM: Within functional limits      Strength:    Strength: Generally decreased, functional        Tone & Sensation:   Tone: Normal        Functional Mobility:  Bed Mobility:  Rolling: Stand-by assistance  Supine to Sit: Stand-by assistance     Scooting: Stand-by assistance  Transfers:  Sit to Stand: Contact guard assistance  Stand to Sit: Contact guard assistance        Bed to Chair: Contact guard assistance              Balance:   Sitting: Intact; With support  Standing: Impaired; Without support  Standing - Static: Good  Standing - Dynamic : Fair;Occasional  Ambulation/Gait Training:  Distance (ft): 20 Feet (ft)(ICU bed > commode> sink > bedside recliner)  Assistive Device: Gait belt  Ambulation - Level of Assistance: Contact guard assistance;Minimal assistance     Gait Description (WDL): Exceptions to [facility]           Base of Support: Narrowed     Speed/Christy: Shuffled; Slow     Therapeutic Exercises:   Not completed this session    Functional Measure:  [address] [facility] [address] [address] AM-PAC 6 Clicks         Basic Mobility Inpatient Short Form  How much difficulty does the patient currently have. .. Unable A Lot A Little None   1. Turning over in bed (including adjusting bedclothes, sheets and blankets)? [] 1   [] 2   [x] 3   [] 4   2. Sitting down on and standing up from a chair with arms ( e.g., wheelchair, bedside commode, etc.)   [] 1   [] 2   [x] 3   [] 4   3. Moving from lying on back to sitting on the side of the bed? [] 1   [] 2   [x] 3   [] 4          How much help from another person does the patient currently need. .. Total A Lot A Little None   4. Moving to and from a bed to a chair (including a wheelchair)?    [] 1   [] 2   [x] 3   [] 4 5.  Need to walk in hospital room? [] 1   [] 2   [x] 3   [] 4   6. Climbing 3-5 steps with a railing? [] 1   [x] 2   [] 3   [] 4   © , Trustees of [facility] [employer], under license to [employer]. All rights reserved     Score:  Initial:  Most Recent: X (Date: 3/3/2021 )   Interpretation of Tool:  Represents activities that are increasingly more difficult (i.e. Bed mobility, Transfers, Gait). Score 24 23 22-20 19-15 14-10 9-7 6   Modifier CH CI CJ CK CL CM CN         Physical Therapy Evaluation Charge Determination   History Examination Presentation Decision-Making   HIGH Complexity :3+ comorbidities / personal factors will impact the outcome/ POC  HIGH Complexity : 4+ Standardized tests and measures addressing body structure, function, activity limitation and / or participation in recreation  LOW Complexity : Stable, uncomplicated  Other outcome measures ampac 6  mod      Based on the above components, the patient evaluation is determined to be of the following complexity level: LOW     Pain Ratin/10 reported    Activity Tolerance:   Good    After treatment patient left in no apparent distress:   Sitting in chair and Call bell within reach and nsg updated. GOALS:    Problem: Mobility Impaired (Adult and Pediatric)  Goal: *Acute Goals and Plan of Care (Insert Text)  Description: Pt will be I with LE HEP in 7 days. Pt will perform bed mobility with mod I in 7 days. Pt will perform transfers with mod I in 7 days. Pt will amb  feet with LRAD safely with mod I in 7 days. Outcome: Not Met       COMMUNICATION/EDUCATION:   The patients plan of care was discussed with: Occupational therapist, Registered nurse and Case management. Fall prevention education was provided and the patient/caregiver indicated understanding., Patient/family have participated as able in goal setting and plan of care. and Patient/family agree to work toward stated goals and plan of care.     PT/OT sessions occurred together for increased safety of pt and clinician.        Thank you for this referral.  Tatiana Ortiz, PT, DPT   Time Calculation: 25 mins

## 2021-03-03 NOTE — PROGRESS NOTES
Progress Note      3/3/2021 6:20 AM  NAME: Nathaly Gutierrez   MRN:  [id number]   Admit Diagnosis: PNA (pneumonia) [J18.9]  SOB (shortness of breath) [R06.02]      Problem List:   1.  Incomplete database secondary to altered mental status  2.  Patient presents for shortness of breath   3.  Acute hypoxic respiratory failure requiring intubation-now extubated  4.  Pneumonia  5.  Fluid overload, pulmonary edema  6.  Goodpasture's syndrome  7.  End-stage renal disease dialysis dependent  8.  Cardiomyopathy (ejection fraction =17%)  9.  Grade I (mild) diastolic dysfunction, or impaired relaxation  10.  Mild pulmonary hypertension (RVSP =42 mmHg)     11.  Valvular heart disease         11a.  Mild to moderate tricuspid regurgitation         11b.  Mild pulmonic regurgitation         11c.  Mild to moderate mitral regurgitation  12.  Profound bradycardia corrected  13.  Possible seizure disorder  14.  Gastroesophageal reflux disease (GERD)  15.  Anemia  16.  Thrombocytopenia  17.  Electrolyte abnormalities (hyponatremia, hyperkalemia, and hypocalcemia)       Subjective: The patient is seen and examined in room 284. There were no acute cardiovascular events reported overnight. Currently, she denies any cardiovascular complaints. I again discussed the need for left heart catheterization with the patient. She indicates she will discuss the matter with her . Discussed with RN events overnight.      Medications Personally Reviewed:    Current Facility-Administered Medications   Medication Dose Route Frequency    [START ON 3/4/2021] Vancomycin Random Level ordered for 3-4-21 AM   Other ONCE    heparin (porcine) 1,000 unit/mL injection 3,600 Units  3,600 Units Hemodialysis Q TU, TH & SAT    hydrALAZINE (APRESOLINE) 20 mg/mL injection 10 mg  10 mg IntraVENous Q6H PRN    amLODIPine (NORVASC) tablet 10 mg  10 mg Oral DAILY    hydrALAZINE (APRESOLINE) tablet 25 mg  25 mg Oral TID    carvediloL (COREG) tablet 6.25 mg  6.25 mg Oral BID WITH MEALS    hydrOXYzine pamoate (VISTARIL) capsule 50 mg  50 mg Oral Q6H PRN    VANCOMYCIN INFORMATION NOTE 1 Each  1 Each Other Rx Dosing/Monitoring    DOBUTamine (DOBUTREX) 500 mg/250 mL (2,000 mcg/mL) infusion  5 mcg/kg/min IntraVENous CONTINUOUS    NOREPINephrine (LEVOPHED) 16,000 mcg in dextrose 5% 250 mL infusion  2-16 mcg/min IntraVENous TITRATE    insulin lispro (HUMALOG) injection   SubCUTAneous AC&HS    glucose chewable tablet 16 g  4 Tab Oral PRN    dextrose (D50W) injection syrg 12.5-25 g  25-50 mL IntraVENous PRN    glucagon (GLUCAGEN) injection 1 mg  1 mg IntraMUSCular PRN    [Held by provider] azaTHIOprine (IMURAN) tablet 25 mg  25 mg Oral DAILY    propofol (DIPRIVAN) 10 mg/mL infusion  0-50 mcg/kg/min IntraVENous TITRATE    metoprolol (LOPRESSOR) injection 5 mg  5 mg IntraVENous Q6H PRN    methylPREDNISolone (PF) (SOLU-MEDROL) injection 40 mg  40 mg IntraVENous Q8H    vitamin B complex capsule 1 Cap  1 Cap Oral DAILY    pantoprazole (PROTONIX) tablet 40 mg  40 mg Oral DAILY    sevelamer carbonate (RENVELA) tab 800 mg  800 mg Oral TID WITH MEALS    acetaminophen (TYLENOL) tablet 650 mg  650 mg Oral Q6H PRN    Or    acetaminophen (TYLENOL) suppository 650 mg  650 mg Rectal Q6H PRN    polyethylene glycol (MIRALAX) packet 17 g  17 g Oral DAILY PRN    ondansetron (ZOFRAN ODT) tablet 4 mg  4 mg Oral Q8H PRN    Or    ondansetron (ZOFRAN) injection 4 mg  4 mg IntraVENous Q6H PRN    piperacillin-tazobactam (ZOSYN) 3.375 g in 0.9% sodium chloride (MBP/ADV) 100 mL MBP  3.375 g IntraVENous Q12H           Objective:      Physical Exam:  Last 24hrs VS reviewed since prior progress note.  Most recent are:    Visit Vitals  /83 (BP 1 Location: Left upper arm, BP Patient Position: At rest)   Pulse 67   Temp 98.4 °F (36.9 °C)   Resp 16   Ht 5' 3\" (1.6 m)   Wt 48 kg (105 lb 13.1 oz)   SpO2 97%   BMI 18.75 kg/m²     No intake or output data in the 24 hours ending 03/03/21 0620     General Appearance: Well developed, in no acute respiratory distress. Chest: Lungs clear to auscultation bilaterally. Cardiovascular: JVP is not elevated, PMI is not attempted, normal intensity S1 and S2, without S3. Abdomen: Soft, non-tender, bowel sounds are active. Extremities: No edema bilaterally. Data Review    Telemetry: Sinus rhythm without ventricular ectopy    EKG:   [x]  No new EKG for review    Lab Data Personally Reviewed:    Recent Labs     03/03/21 0400 03/02/21  0530   WBC 5.7 7.6   HGB 11.8 11.5   HCT 36.5 35.8    155     No results for input(s): INR, PTP, APTT, INREXT in the last 72 hours. Recent Labs     03/03/21 0400 03/02/21  0530 03/01/21  0558   * 132* 133*   K 4.8 5.0 5.3*   CL 95* 95* 97   CO2 27 24 25   BUN 57* 80* 61*   CREA 6.21* 8.27* 6.94*   * 111* 192*   CA 8.2* 7.9* 7.8*     No results for input(s): CPK, CKNDX, TROIQ in the last 72 hours. No lab exists for component: CPKMB  No results found for: CHOL, CHOLX, CHLST, CHOLV, HDL, HDLP, LDL, LDLC, DLDLP, Colby Teddy, CHHD, CHHDX    Recent Labs     03/03/21 0400 03/02/21  0530 03/01/21  0558   AP 55 64 74   TP 5.6* 5.9* 5.9*   ALB 2.8* 3.0* 3.0*   GLOB 2.8 2.9 2.9     Recent Labs     03/02/21  0436 03/01/21  0535   PH 7.43 7.42   PCO2 35 41   PO2 72* 237*           Assessment/Plan:   1. Transfer to 4 W. with telemetry  2. Continue to monitor serum electrolytes, and renal function  3. Continue to monitor fluid balance, and daily weights  4. Continue current cardiovascular medications including amlodipine, carvedilol, heparin, hydralazine, and insulin  5.   Consider left heart catheterization (LHC) with possible percutaneous coronary intervention (PCI)     Irasema Harrison MD

## 2021-03-03 NOTE — PROGRESS NOTES
OCCUPATIONAL THERAPY EVALUATION  Patient: Sherri Shetty (67 y.o. female)  Date: 3/3/2021  Primary Diagnosis: PNA (pneumonia) [J18.9]  SOB (shortness of breath) [R06.02]  Procedure(s) (LRB):  INSERT TEMPORARY PACEMAKER (N/A) 4 Days Post-Op   Precautions: falls    ASSESSMENT  Patient is a 72year old female, who came to the ED with increasing weakness, SOB, hypoxic with O2 saturation in 80s and admitted for PNA, SOB, negative for COVID and flu on 2/26/2021. Since admission patient has undergone a temporary pacemaker placement and was intubated 2/27/2021 and extubated 3/01/2021. PMH includes: ESRD on home dialysis, CKD, heart failure. Based on the objective data described below, the patient presents with decreased standing balance with several minor LOB requiring CGA to min A to correct, decreased activity tolerance, generalized deconditioning, decreased generalized strength, increased need for A with self care and functional mobility/transfers. Patient semi supine in bed upon OT arrival and agreeable to working with therapy. 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 Patient has 3 steps down to primary bed/bath and 10 steps to upstairs with both having L sided rails. Patient reports being independent for ADLs/IADLs PTA, ambulates with no AD. DME includes: tub transfer bench and rollator. Patient SBA bed mobility with increased time, SBA increased time sup -> sit and scooting, CGA sit <> stand with hand held assist of 1-2 with balance improving throughout session. Patient ambulated to toilet, CGA toilet transfer and toileting, pt brushed hair while sitting on commode with SBA. Then ambulated to sink to wash hands and face and brush teeth, CGA grooming, SBA LE dressing sitting EOB. Patient then CGA for chair transfer to sit up in recliner.  Patient would benefit from continued skilled OT services to address above deficits and improve safety and independence with self care and functional mobility/transfers. Recommend discharge to home with [facility] and family care when medically appropriate. Current Level of Function Impacting Discharge (ADLs/self-care): SBA LE dressing, CGA toileting and grooming. Other factors to consider for discharge: time since onset, good family support, trilevel home        PLAN :  Recommendations and Planned Interventions: self care training, functional mobility training, therapeutic exercise, balance training, therapeutic activities, endurance activities, patient education, home safety training and family training/education    Frequency/Duration: Patient will be followed by occupational therapy 4 times a week to address goals. Recommendation for discharge: (in order for the patient to meet his/her long term goals)  HHOT and family care    This discharge recommendation:  Has been made in collaboration with the attending provider and/or case management    IF patient discharges home will need the following DME: increased family assist initially       SUBJECTIVE:   Patient stated My legs feel like noodles.     OBJECTIVE DATA SUMMARY:   HISTORY:   Past Medical History:   Diagnosis Date    Chronic kidney disease     Heart failure ([facility] [address]    History reviewed. No pertinent surgical history.     Expanded or extensive additional review of patient history:     Home Situation  Home Environment: Private residence  # Steps to Enter: 4  Rails to Enter: Yes  Hand Rails : Left  Wheelchair Ramp: No  One/Two Story Residence: Split level  # of Interior Steps: 10  Interior Rails: Left  Lift Chair Available: No  Living Alone: No  Support Systems: Spouse/Significant Other/Partner, Family member(s)  Patient Expects to be Discharged to[de-identified] Private residence  Current DME Used/Available at Home: Walker, rollator, Tub transfer bench  Tub or Shower Type: Tub/Shower combination    PLOF: Pt I for ADLS/IADLS, I with mobility prior to admission. EXAMINATION OF PERFORMANCE DEFICITS:  Cognitive/Behavioral Status:  Neurologic State: Alert  Orientation Level: Oriented to person;Oriented to place; Disoriented to time  Cognition: Follows commands  Safety/Judgement: Fall prevention;Home safety    Skin: intact where visible    Edema: none noted    Hearing: Auditory  Auditory Impairment: None    Vision/Perceptual:    No deficits noted at this time    Range of Motion:  AROM: Within functional limits    Strength:  Strength: Generally decreased, functional     RUE Strength  Observation: grossly observed to be 3+/5     LUE Strength  Observation: grossly observed to be 3+/5    Coordination:  Generally intact    Tone & Sensation:  Tone: Normal    Balance:  Sitting: Intact; With support  Standing: Impaired; Without support  Standing - Static: Good  Standing - Dynamic : Fair;Occasional    Functional Mobility and Transfers for ADLs:  Bed Mobility:  Rolling: Stand-by assistance  Supine to Sit: Stand-by assistance  Scooting: Stand-by assistance    Transfers:  Sit to Stand: Contact guard assistance  Stand to Sit: Contact guard assistance  Bed to Chair: Contact guard assistance  Bathroom Mobility: Contact guard assistance  Toilet Transfer : Contact guard assistance  Assistive Device : Gait Belt(hand held assist)    ADL Assessment:    Oral Facial Hygiene/Grooming: Contact guard assistance(standing at sink)    Lower Body Dressing: Stand-by assistance(EOB)    Toileting: Contact guard assistance    ADL Intervention and task modifications:    Grooming  Grooming Assistance: Contact guard assistance  Position Performed: Standing  Washing Face: Contact guard assistance  Washing Hands: Contact guard assistance  Brushing Teeth: Contact guard assistance  Brushing/Combing Hair: Stand-by assistance(sitting on toilet)    Lower Body Dressing Assistance  Dressing Assistance: Stand-by assistance  Socks: Stand-by assistance  Leg Crossed Method Used: No  Position Performed: Seated edge of bed    Toileting  Toileting Assistance: Contact guard assistance  Bladder Hygiene: Stand-by assistance  Bowel Hygiene: Stand-by assistance  Clothing Management: Contact guard assistance    Cognitive Retraining  Safety/Judgement: Fall prevention;Home safety    Therapeutic Exercise:  Patient may benefit from UE HEP to increase strength, initiate next session as able     Functional Measure:    [address] [place] [address] AM-PACTM \"6 Clicks\"                                                       Daily Activity Inpatient Short Form  How much help from another person does the patient currently need. .. Total; A Lot A Little None   1. Putting on and taking off regular lower body clothing? []  1 []  2 [x]  3 []  4   2. Bathing (including washing, rinsing, drying)? []  1 []  2 [x]  3 []  4   3. Toileting, which includes using toilet, bedpan or urinal? [] 1 []  2 [x]  3 []  4   4. Putting on and taking off regular upper body clothing? []  1 []  2 [x]  3 []  4   5. Taking care of personal grooming such as brushing teeth? []  1 []  2 [x]  3 []  4   6. Eating meals? []  1 []  2 [x]  3 []  4   © 2007, Trustees of [address] [place] [id number], under license to [employer]. All rights reserved     Score: 18/24     Interpretation of Tool:  Represents clinically-significant functional categories (i.e. Activities of daily living).   Percentage of Impairment CH    0%   CI    1-19% CJ    20-39% CK    40-59% CL    60-79% CM    80-99% CN     100%   [facility]  Score 6-24 24 23 20-22 15-19 10-14 7-9 6        Occupational Therapy Evaluation Charge Determination   History Examination Decision-Making   LOW Complexity : Brief history review  LOW Complexity : 1-3 performance deficits relating to physical, cognitive , or psychosocial skils that result in activity limitations and / or participation restrictions  LOW Complexity : No comorbidities that affect functional and no verbal or physical assistance needed to complete eval tasks       Based on the above components, the patient evaluation is determined to be of the following complexity level: LOW     Pain Ratin/10    Activity Tolerance:   Fair, tolerates ADLs without rest breaks and SpO2 stable on RA    After treatment patient left in no apparent distress:    Sitting in chair, Call bell within reach and nursing updated    COMMUNICATION/EDUCATION:   The patients plan of care was discussed with: Physical therapist, Registered nurse and Case management. Home safety education was provided and the patient/caregiver indicated understanding., Patient/family have participated as able in goal setting and plan of care. and Patient/family agree to work toward stated goals and plan of care. This patients plan of care is appropriate for delegation to [facility]. OT/PT sessions occurred together for increased patient and clinician safety.     Problem: Self Care Deficits Care Plan (Adult)  Goal: *Acute Goals and Plan of Care (Insert Text)  Description: Pt will be mod I sup <> sit in prep for EOB ADLs  Pt will be I grooming standing at sink  Pt will be mod I LE dressing sitting EOB/long sit  Pt will be I sit <>  prep for toileting  Pt will be mod I toileting/toilet transfer/cloth mgmt LRAD  Pt will be I following UE HEP in prep for self care tasks     Outcome: Not Met     Thank you for this referral.  Holly Reynolds, ALEXR/L  Time Calculation: 25 mins

## 2021-03-03 NOTE — PROGRESS NOTES
Primary Nurse Benedicto Muñoz RN and Abeba Scott RN performed a dual skin assessment on this patient no impairment noted.

## 2021-03-03 NOTE — CONSULTS
PULMONARY ICU NOTE  [facility]    Name: Aubree Weinberg MRN: [id number]   : 1950 Hospital: [address] [place] [address]   Date: 3/3/2021  Admission date: 2021 Hospital Day: 6       HPI:     Hospital Problems  Never Reviewed          Codes Class Noted POA    PNA (pneumonia) ICD-10-CM: J18.9  ICD-9-CM: 716  2021 Unknown        SOB (shortness of breath) ICD-10-CM: R06.02  ICD-9-CM: 786.05  2021 Unknown                   [x] High complexity decision making was performed  [x] See my orders for details      Subjective/Initial History:     I was asked by Jamel Fry MD to see Aubree Weinberg  a 72 y.o.  female in consultation     Excerpts from admission 2021 or consult notes as follows:   71-year-old lady came in because of shortness of breath and dyspnea significant past medical history of end-stage renal disease on hemodialysis, Goodpasture syndrome anemia of chronic disease hypertension steroid-dependent, she was not able to dialysis at home because of generalized weakness and shortness of breath she has been on home dialysis no fever no chills she was put on 100% nonrebreather mask which has been switched to noninvasive ventilator not she is going for dialysis because of severe hypoxia so pulmonary critical care consult was called for further evaluation. She is a lifetime non-smoker.       No Known Allergies     MAR reviewed and pertinent medications noted or modified as needed     Current Facility-Administered Medications   Medication    [START ON 3/4/2021] Vancomycin Random Level ordered for 3--21 AM    heparin (porcine) 1,000 unit/mL injection 3,600 Units    hydrALAZINE (APRESOLINE) 20 mg/mL injection 10 mg    amLODIPine (NORVASC) tablet 10 mg    hydrALAZINE (APRESOLINE) tablet 25 mg    carvediloL (COREG) tablet 6.25 mg    hydrOXYzine pamoate (VISTARIL) capsule 50 mg    VANCOMYCIN INFORMATION NOTE 1 Each    DOBUTamine (DOBUTREX) 500 mg/250 mL (2,000 mcg/mL) infusion    NOREPINephrine (LEVOPHED) 16,000 mcg in dextrose 5% 250 mL infusion    insulin lispro (HUMALOG) injection    glucose chewable tablet 16 g    dextrose (D50W) injection syrg 12.5-25 g    glucagon (GLUCAGEN) injection 1 mg    [Held by provider] azaTHIOprine (IMURAN) tablet 25 mg    propofol (DIPRIVAN) 10 mg/mL infusion    metoprolol (LOPRESSOR) injection 5 mg    methylPREDNISolone (PF) (SOLU-MEDROL) injection 40 mg    vitamin B complex capsule 1 Cap    pantoprazole (PROTONIX) tablet 40 mg    sevelamer carbonate (RENVELA) tab 800 mg    acetaminophen (TYLENOL) tablet 650 mg    Or    acetaminophen (TYLENOL) suppository 650 mg    polyethylene glycol (MIRALAX) packet 17 g    ondansetron (ZOFRAN ODT) tablet 4 mg    Or    ondansetron (ZOFRAN) injection 4 mg    piperacillin-tazobactam (ZOSYN) 3.375 g in 0.9% sodium chloride (MBP/ADV) 100 mL MBP      Patient PCP: None  PMH:  has a past medical history of Chronic kidney disease and Heart failure ([facility] [address] PSH:   has no past surgical history on file. FHX: family history is not on file. SHX:  reports that she has never smoked. She has never used smokeless tobacco. She reports previous alcohol use. She reports previous drug use. ROS:  Unable to obtain as patient was lethargic and severely short of breath      Objective:     Vital Signs: Telemetry:    normal sinus rhythm Intake/Output:   Visit Vitals  BP (!) 145/96 (BP 1 Location: Left upper arm, BP Patient Position: At rest)   Pulse (!) 53   Temp 97.5 °F (36.4 °C) Comment: Simultaneous filing. User may not have seen previous data.    Resp 18   Ht 5' 3\" (1.6 m)   Wt 48 kg (105 lb 13.1 oz)   SpO2 99%   BMI 18.75 kg/m²       Temp (24hrs), Av °F (36.7 °C), Min:97.5 °F (36.4 °C), Max:98.4 °F (36.9 °C)        O2 Device: Room air O2 Flow Rate (L/min): 0 l/min       Wt Readings from Last 4 Encounters:   21 48 kg (105 lb 13.1 oz)        No intake or output data in the 24 hours ending 03/03/21 0832    Last shift:      No intake/output data recorded. Last 3 shifts: 03/01 1901 - 03/03 0700  In: 200 [I.V.:200]  Out: -        Physical Exam:   Patient is intubated on ventilator  Physical Exam   Constitutional: She appears distressed. HENT:   Head: Normocephalic and atraumatic. Eyes: Pupils are equal, round, and reactive to light. EOM are normal.   Neck: Normal range of motion. Neck supple. Cardiovascular: Normal rate and regular rhythm. Pulmonary/Chest: She is in respiratory distress. She has rales. Abdominal: Soft. Musculoskeletal: Normal range of motion. Neurological: She is alert. Skin: Skin is warm. Labs:    Recent Labs     03/03/21  0400 03/02/21  0530 03/01/21  0558   WBC 5.7 7.6 6.3   HGB 11.8 11.5 10.5*    155 143*     Recent Labs     03/03/21  0400 03/02/21  0530 03/01/21  1245 03/01/21  0558   * 132*  --  133*   K 4.8 5.0  --  5.3*   CL 95* 95*  --  97   CO2 27 24  --  25   * 111*  --  192*   BUN 57* 80*  --  61*   CREA 6.21* 8.27*  --  6.94*   CA 8.2* 7.9*  --  7.8*   PHOS  --   --  6.9*  --    ALB 2.8* 3.0*  --  3.0*   ALT 75 73  --  75     Recent Labs     03/02/21  0436 03/01/21  0535   PH 7.43 7.42   PCO2 35 41   PO2 72* 237*   HCO3 24 26   FIO2 21.0 50.0     No results for input(s): CPK, CKNDX, TROIQ in the last 72 hours. No lab exists for component: CPKMB  No results found for: BNPP, BNP   Lab Results   Component Value Date/Time    Culture result: No growth 2 days 02/28/2021 09:30 AM    Culture result: No growth 3 days 02/27/2021 01:58 AM    Culture result: No growth 3 days 02/27/2021 01:45 AM   No results found for: TSH, TSHEXT, TSHEXT    Imaging:    CXR Results  (Last 48 hours)               03/02/21 0355  XR CHEST PORT Final result    Narrative:  Chest single view. Comparison single view chest March 1, 2021       ET tube and NG tube have been removed. Stable right-sided permacath. Unchanged   appearance for the lungs.  No gross interstitial or alveolar pulmonary edema. Cardiac and mediastinal structures unchanged. No pneumothorax or sizable pleural   effusion. Results from [place] encounter on 02/26/21   XR CHEST PORT    Narrative Chest single view. Comparison single view chest March 1, 2021    ET tube and NG tube have been removed. Stable right-sided permacath. Unchanged  appearance for the lungs. No gross interstitial or alveolar pulmonary edema. Cardiac and mediastinal structures unchanged. No pneumothorax or sizable pleural  effusion. XR CHEST PORT    Narrative Portable chest, 0318 hours, compared with 2/28/2021. Impression Stable appearance of endotracheal tube, right central dialysis  catheter, gastric tube, and temporary right ventricular pacing lead. Bilateral  pulmonary edema and small pleural effusions, consistent with clinical CHF,  subjectively slightly improved. No pneumothorax or other acute change. XR CHEST PORT    Narrative Upright radiograph chest 11:12 AM compared with 3/27/2021 at 7:19 AM.    INDICATION: Tachypnea, pneumonia. Right IJ central line remains in place. Heart size is stable. Extensive  bilateral airspace disease, right greater than left, in a predominantly central  distribution shows minimal improvement compared to the previous study. Defibrillator pads project over the right upper chest and left lower chest. No  pneumothorax. Definite displaced fractures. Next      Impression Persistent bilateral airspace disease showing slight improvement  compared to earlier study. Results from [place] encounter on 02/26/21   CTA CHEST W OR W WO CONT    Narrative CT dose reduction was achieved through use of a standardized protocol tailored  for this examination and automatic exposure control for dose modulation. Contrast study maximum intensity projections    There is a extensive diffuse lung disease.  Dense consolidation is seen  throughout much of the right lower lobe and there is mild variable groundglass  opacification and small foci of dense consolidation scattered elsewhere  throughout much of each lung, right greater than left, with subpleural sparing,  mostly on the LEFT. Right middle lobe is disproportionately less involved, as is  the anterior left upper lobe. Central airways are open    Pulmonary arteries are densely opacified and show no filling defect or  distortion. Aorta shows normal dimensions, without dissection. No mediastinal or  hilar adenopathy. Small moderate symmetric pleural effusions. No pericardial  effusion. No significant abdominal finding. Body wall edema      Impression The findings may be a combination of extensive pneumonia,  pneumonitis and edema. No evidence of PE         IMPRESSION:   1. Acute hypoxic respiratory failure  2. History of Goodpasture syndrome  3. Severe cardiomyopathy ejection fraction about 17%  4. Fluid overload pulmonary edema  5. End-stage renal disease on hemodialysis  6. Neutropenia and hyperkalemia  7. Anemia  8. Bradycardia resolved  Body mass index is 18.75 kg/m². 9. Pt is requiring Drug therapy requiring intensive monitoring for toxicity  10. Pt is unstable, unpredictable needing inpatient monitoring; is acutely ill and at high risk of sudden decline and decompensation with severe consequenses and continued end organ dysfunction and failure  11. Prognosis guarded       RECOMMENDATIONS/PLAN:     1. Extubated on 3/1 patient is on room air now  2. CAT scan of the chest shows pulmonary edema CHF with prior history of Goodpasture syndrome. 3. Agree with dialysis   4. Discontinue Solu-Medrol start patient on prednisone  5. Patient is afebrile normal white count  6. 2D echo shows ejection fraction of 17% off aminophylline and dobutamine  7. Supplemental O2 to keep sats > 93%  8. Aspiration precautions  9. Labs to follow electrolytes, renal function and and blood counts  10. Glucose monitoring and SSI  11.  Bronchial hygiene with respiratory therapy techniques, bronchodilators  12.  DVT, SUP prophylaxis       ·           Netta Mcdaniel MD

## 2021-03-03 NOTE — PROGRESS NOTES
General Daily Progress Note          Patient Name:   Arti Mornoy       YOB: 1950       Age:  72 y.o. Admit Date: 2/26/2021      Subjective:     Patient doing well after extubation her alert awake talking eating                 Objective:     Visit Vitals  BP (!) 145/96 (BP 1 Location: Left upper arm, BP Patient Position: At rest)   Pulse (!) 53   Temp 97.5 °F (36.4 °C)   Resp 18   Ht 5' 3\" (1.6 m)   Wt 48 kg (105 lb 13.1 oz)   SpO2 99%   BMI 18.75 kg/m²        Recent Results (from the past 24 hour(s))   GLUCOSE, POC    Collection Time: 03/02/21  3:38 PM   Result Value Ref Range    Glucose (POC) 148 (H) 65 - 100 mg/dL    Performed by Alan Friday    GLUCOSE, POC    Collection Time: 03/02/21  9:52 PM   Result Value Ref Range    Glucose (POC) 133 (H) 65 - 100 mg/dL    Performed by Shay Grubbs    CBC WITH AUTOMATED DIFF    Collection Time: 03/03/21  4:00 AM   Result Value Ref Range    WBC 5.7 3.6 - 11.0 K/uL    RBC 4.14 3.80 - 5.20 M/uL    HGB 11.8 11.5 - 16.0 g/dL    HCT 36.5 35.0 - 47.0 %    MCV 88.2 80.0 - 99.0 FL    MCH 28.5 26.0 - 34.0 PG    MCHC 32.3 30.0 - 36.5 g/dL    RDW 16.3 (H) 11.5 - 14.5 %    PLATELET 618 770 - 053 K/uL    MPV 11.5 8.9 - 12.9 FL    NEUTROPHILS 94 (H) 32 - 75 %    LYMPHOCYTES 4 (L) 12 - 49 %    MONOCYTES 2 (L) 5 - 13 %    EOSINOPHILS 0 0 - 7 %    BASOPHILS 0 0 - 1 %    IMMATURE GRANULOCYTES 0 0.0 - 0.5 %    ABS. NEUTROPHILS 5.3 1.8 - 8.0 K/UL    ABS. LYMPHOCYTES 0.2 (L) 0.8 - 3.5 K/UL    ABS. MONOCYTES 0.1 0.0 - 1.0 K/UL    ABS. EOSINOPHILS 0.0 0.0 - 0.4 K/UL    ABS. BASOPHILS 0.0 0.0 - 0.1 K/UL    ABS. IMM.  GRANS. 0.0 0.00 - 0.04 K/UL    DF AUTOMATED     METABOLIC PANEL, COMPREHENSIVE    Collection Time: 03/03/21  4:00 AM   Result Value Ref Range    Sodium 133 (L) 136 - 145 mmol/L    Potassium 4.8 3.5 - 5.1 mmol/L    Chloride 95 (L) 97 - 108 mmol/L    CO2 27 21 - 32 mmol/L    Anion gap 11 5 - 15 mmol/L    Glucose 145 (H) 65 - 100 mg/dL    BUN 57 (H) 6 - 20 mg/dL    Creatinine 6.21 (H) 0.55 - 1.02 mg/dL    BUN/Creatinine ratio 9 (L) 12 - 20      GFR est AA 8 (L) >60 ml/min/1.73m2    GFR est non-AA 7 (L) >60 ml/min/1.73m2    Calcium 8.2 (L) 8.5 - 10.1 mg/dL    Bilirubin, total 0.4 0.2 - 1.0 mg/dL    AST (SGOT) 36 15 - 37 U/L    ALT (SGPT) 75 12 - 78 U/L    Alk. phosphatase 55 45 - 117 U/L    Protein, total 5.6 (L) 6.4 - 8.2 g/dL    Albumin 2.8 (L) 3.5 - 5.0 g/dL    Globulin 2.8 2.0 - 4.0 g/dL    A-G Ratio 1.0 (L) 1.1 - 2.2     GLUCOSE, POC    Collection Time: 03/03/21  8:00 AM   Result Value Ref Range    Glucose (POC) 133 (H) 65 - 100 mg/dL    Performed by Michael Pacheco, POC    Collection Time: 03/03/21 11:06 AM   Result Value Ref Range    Glucose (POC) 159 (H) 65 - 100 mg/dL    Performed by Judson Mathias      [unfilled]      Review of Systems  Patient is alert awake denies any chest pain or shortness of breath nausea no vomiting      Physical Exam:      Constitutional: V alert awake answer all simple questions   HENT:   Head: Normocephalic and atraumatic. Eyes: Pupils are equal, round, and reactive to light. EOM are normal.   Cardiovascular: Normal rate, regular rhythm and normal heart sounds. Pulmonary/Chest: Decreased breath sounds   abdominal: Soft. Bowel sounds are normal. There is no abdominal tenderness. There is no rebound and no guarding. Musculoskeletal: Normal range of motion. Neurological: Lethargic and sleepy    XR CHEST PORT   Final Result      XR CHEST PORT   Final Result   Stable appearance of endotracheal tube, right central dialysis   catheter, gastric tube, and temporary right ventricular pacing lead. Bilateral   pulmonary edema and small pleural effusions, consistent with clinical CHF,   subjectively slightly improved. No pneumothorax or other acute change. XR CHEST PORT   Final Result   Bilateral airspace disease/edema showing slight improvement compared   to the previous study.       XR CHEST PORT   Final Result Persistent bilateral airspace disease showing slight improvement   compared to earlier study. XR CHEST PORT   Final Result   Bilateral central airspace disease/edema right greater than left   showing slight worsening on the right. CTA CHEST W OR W WO CONT   Final Result   The findings may be a combination of extensive pneumonia,   pneumonitis and edema. No evidence of PE      XR CHEST SNGL V   Final Result           Recent Results (from the past 24 hour(s))   GLUCOSE, POC    Collection Time: 03/02/21  3:38 PM   Result Value Ref Range    Glucose (POC) 148 (H) 65 - 100 mg/dL    Performed by [facility]    GLUCOSE, POC    Collection Time: 03/02/21  9:52 PM   Result Value Ref Range    Glucose (POC) 133 (H) 65 - 100 mg/dL    Performed by [facility]    CBC WITH AUTOMATED DIFF    Collection Time: 03/03/21  4:00 AM   Result Value Ref Range    WBC 5.7 3.6 - 11.0 K/uL    RBC 4.14 3.80 - 5.20 M/uL    HGB 11.8 11.5 - 16.0 g/dL    HCT 36.5 35.0 - 47.0 %    MCV 88.2 80.0 - 99.0 FL    MCH 28.5 26.0 - 34.0 PG    MCHC 32.3 30.0 - 36.5 g/dL    RDW 16.3 (H) 11.5 - 14.5 %    PLATELET 712 703 - 147 K/uL    MPV 11.5 8.9 - 12.9 FL    NEUTROPHILS 94 (H) 32 - 75 %    LYMPHOCYTES 4 (L) 12 - 49 %    MONOCYTES 2 (L) 5 - 13 %    EOSINOPHILS 0 0 - 7 %    BASOPHILS 0 0 - 1 %    IMMATURE GRANULOCYTES 0 0.0 - 0.5 %    ABS. NEUTROPHILS 5.3 1.8 - 8.0 K/UL    ABS. LYMPHOCYTES 0.2 (L) 0.8 - 3.5 K/UL    ABS. MONOCYTES 0.1 0.0 - 1.0 K/UL    ABS. EOSINOPHILS 0.0 0.0 - 0.4 K/UL    ABS. BASOPHILS 0.0 0.0 - 0.1 K/UL    ABS. IMM.  GRANS. 0.0 0.00 - 0.04 K/UL    DF AUTOMATED     METABOLIC PANEL, COMPREHENSIVE    Collection Time: 03/03/21  4:00 AM   Result Value Ref Range    Sodium 133 (L) 136 - 145 mmol/L    Potassium 4.8 3.5 - 5.1 mmol/L    Chloride 95 (L) 97 - 108 mmol/L    CO2 27 21 - 32 mmol/L    Anion gap 11 5 - 15 mmol/L    Glucose 145 (H) 65 - 100 mg/dL    BUN 57 (H) 6 - 20 mg/dL    Creatinine 6.21 (H) 0.55 - 1.02 mg/dL BUN/Creatinine ratio 9 (L) 12 - 20      GFR est AA 8 (L) >60 ml/min/1.73m2    GFR est non-AA 7 (L) >60 ml/min/1.73m2    Calcium 8.2 (L) 8.5 - 10.1 mg/dL    Bilirubin, total 0.4 0.2 - 1.0 mg/dL    AST (SGOT) 36 15 - 37 U/L    ALT (SGPT) 75 12 - 78 U/L    Alk.  phosphatase 55 45 - 117 U/L    Protein, total 5.6 (L) 6.4 - 8.2 g/dL    Albumin 2.8 (L) 3.5 - 5.0 g/dL    Globulin 2.8 2.0 - 4.0 g/dL    A-G Ratio 1.0 (L) 1.1 - 2.2     GLUCOSE, POC    Collection Time: 03/03/21  8:00 AM   Result Value Ref Range    Glucose (POC) 133 (H) 65 - 100 mg/dL    Performed by Otho Nageotte, POC    Collection Time: 03/03/21 11:06 AM   Result Value Ref Range    Glucose (POC) 159 (H) 65 - 100 mg/dL    Performed by Farida Le        Results     Procedure Component Value Units Date/Time    CULTURE, RESPIRATORY/SPUTUM/BRONCH Shaina Plump [[id number]] Collected: 02/28/21 0930    Order Status: Completed Specimen: Sputum Updated: 03/02/21 1233     Special Requests: No Special Requests        GRAM STAIN Occasional WBCs seen               Rare Epithelial cells seen            No organisms seen        Culture result: No growth 2 days       CULTURE, BLOOD #1 [[id number]] Collected: 02/27/21 0158    Order Status: Completed Specimen: Blood Updated: 03/03/21 0718     Special Requests: No Special Requests        Culture result: No growth 3 days       CULTURE, BLOOD #2 [[id number]] Collected: 02/27/21 0145    Order Status: Completed Specimen: Blood Updated: 03/03/21 0718     Special Requests: No Special Requests        Culture result: No growth 3 days       CULTURE, BLOOD, PAIRED [[id number]] Collected: 02/26/21 1430    Order Status: Completed Specimen: Blood Updated: 03/03/21 0718     Special Requests: No Special Requests        Culture result: No growth 4 days       CULTURE, BLOOD [[id number]] Collected: 02/26/21 1430    Order Status: Canceled Specimen: Blood     COVID-19 RAPID TEST [[id number]] Collected: 02/26/21 1336    Order Status: Completed Specimen: Nasopharyngeal Updated: 02/26/21 1409     Specimen source Nasopharyngeal        COVID-19 rapid test Not Detected        Comment: Rapid Abbott ID Now   Rapid NAAT:  The specimen is NEGATIVE for SARS-CoV-2, the novel coronavirus associated with COVID-19. Negative results should be treated as presumptive and, if inconsistent with clinical signs and symptoms or necessary for patient management, should be tested with an alternative molecular assay. Negative results do not preclude SARS-CoV-2 infection and should not be used as the sole basis for patient management decisions. This test has been authorized by the FDA under   an Emergency Use Authorization (EUA) for use by authorized laboratories. Fact sheet for Healthcare Providers: ConventionUpdate.co.nz Fact sheet for Patients: ConventionUpdate.co.nz   Methodology: Isothermal Nucleic Acid Amplification                Labs:     Recent Labs     03/03/21 0400 03/02/21  0530   WBC 5.7 7.6   HGB 11.8 11.5   HCT 36.5 35.8    155     Recent Labs     03/03/21  0400 03/02/21  0530 03/01/21  1245 03/01/21  0558   * 132*  --  133*   K 4.8 5.0  --  5.3*   CL 95* 95*  --  97   CO2 27 24  --  25   BUN 57* 80*  --  61*   CREA 6.21* 8.27*  --  6.94*   * 111*  --  192*   CA 8.2* 7.9*  --  7.8*   PHOS  --   --  6.9*  --      Recent Labs     03/03/21  0400 03/02/21  0530 03/01/21  0558   ALT 75 73 75   AP 55 64 74   TBILI 0.4 0.4 0.4   TP 5.6* 5.9* 5.9*   ALB 2.8* 3.0* 3.0*   GLOB 2.8 2.9 2.9     No results for input(s): INR, PTP, APTT, INREXT, INREXT in the last 72 hours. No results for input(s): FE, TIBC, PSAT, FERR in the last 72 hours. No results found for: FOL, RBCF   Recent Labs     03/02/21  0436 03/01/21  0535   PH 7.43 7.42   PCO2 35 41   PO2 72* 237*     No results for input(s): CPK, CKNDX, TROIQ in the last 72 hours.     No lab exists for component: CPKMB  No results found for: CHOL, [facility], CHLST, CHOLV, HDL, HDLP, LDL, LDLC, DLDLP, TGLX, TRIGL, TRIGP, CHHD, CHHDX  Lab Results   Component Value Date/Time    Glucose (POC) 159 (H) 03/03/2021 11:06 AM    Glucose (POC) 133 (H) 03/03/2021 08:00 AM    Glucose (POC) 133 (H) 03/02/2021 09:52 PM    Glucose (POC) 148 (H) 03/02/2021 03:38 PM    Glucose (POC) 108 (H) 03/02/2021 10:55 AM     No results found for: COLOR, APPRN, SPGRU, REFSG, YESY, PROTU, GLUCU, KETU, BILU, UROU, CARTER, LEUKU, GLUKE, EPSU, BACTU, WBCU, RBCU, CASTS, UCRY      Assessment:     Acute hypoxemic respiratory failure extubated this morning   severe symptomatic bradycardia on Toprol urinary sphincter  End-stage renal disease on hemodialysis at home  Hypotensive  Goodpasture's syndrome  Fluid overload  Hyperkalemia  Anemia of chronic disease      Plan:   On IV Solu-Medrol 40 mg IV every 8 hours   Protonix 40 mg daily  Cardiology consult for permanent pacemaker  D/c  dobutamine and Levophed  On vancomycin and IV Zosyn    Follow-up with nephrology cardiology and pulmonologist  Transferred to [facility].           We will do speech therapy consult and PT OT and repeat the labs in the morning    I spent critical care time 30 minutes independently        Current Facility-Administered Medications:     predniSONE (DELTASONE) tablet 20 mg, 20 mg, Oral, BID WITH MEALS, Nasir Rajput MD, 20 mg at 03/03/21 0850    [START ON 3/4/2021] Vancomycin Random Level ordered for 3-4-21 AM, , Other, ONCE, Kim George MD    heparin (porcine) 1,000 unit/mL injection 3,600 Units, 3,600 Units, Hemodialysis, Q TU, TH & SAT, Jones Gonzalez MD, 3,600 Units at 03/02/21 1704    hydrALAZINE (APRESOLINE) 20 mg/mL injection 10 mg, 10 mg, IntraVENous, Q6H PRN, Dedrick Garcia MD    amLODIPine (NORVASC) tablet 10 mg, 10 mg, Oral, DAILY, Dedrick Garcia MD, 10 mg at 03/03/21 0839    hydrALAZINE (APRESOLINE) tablet 25 mg, 25 mg, Oral, TID, Dedrick Garcia MD, 25 mg at 03/03/21 0839    carvediloL (COREG) tablet 6.25 mg, 6.25 mg, Oral, BID WITH MEALS, Luciana SIMS MD, Stopped at 03/03/21 0800    hydrOXYzine pamoate (VISTARIL) capsule 50 mg, 50 mg, Oral, Q6H PRN, Kim George MD, 50 mg at 02/27/21 0528    VANCOMYCIN INFORMATION NOTE 1 Each, 1 Each, Other, Rx Dosing/Monitoring, Renata George MD    DOBUTamine (DOBUTREX) 500 mg/250 mL (2,000 mcg/mL) infusion, 5 mcg/kg/min, IntraVENous, CONTINUOUS, Giselle Garcia MD, Stopped at 02/28/21 1307    NOREPINephrine (LEVOPHED) 16,000 mcg in dextrose 5% 250 mL infusion, 2-16 mcg/min, IntraVENous, TITRATE, Renata George MD    insulin lispro (HUMALOG) injection, , SubCUTAneous, AC&HS, Renata George MD, Stopped at 03/02/21 2200    glucose chewable tablet 16 g, 4 Tab, Oral, PRN, Renata George MD    dextrose (D50W) injection syrg 12.5-25 g, 25-50 mL, IntraVENous, PRN, Renata George MD    glucagon (GLUCAGEN) injection 1 mg, 1 mg, IntraMUSCular, PRN, Gladys Hanks MD    [Held by provider] azaTHIOprine (IMURAN) tablet 25 mg, 25 mg, Oral, DAILY, Milad Vargas MD, Stopped at 02/28/21 0900    propofol (DIPRIVAN) 10 mg/mL infusion, 0-50 mcg/kg/min, IntraVENous, TITRATE, Kim George MD, Stopped at 03/01/21 0815    metoprolol (LOPRESSOR) injection 5 mg, 5 mg, IntraVENous, Q6H PRN, Ayush Evans MD, 5 mg at 02/27/21 1646    vitamin B complex capsule 1 Cap, 1 Cap, Oral, DAILY, Kim George MD, 1 Cap at 03/01/21 1648    pantoprazole (PROTONIX) tablet 40 mg, 40 mg, Oral, DAILY, Kim George MD, 40 mg at 03/03/21 2813    sevelamer carbonate (RENVELA) tab 800 mg, 800 mg, Oral, TID WITH MEALS, Kim George MD, 800 mg at 03/03/21 7003    acetaminophen (TYLENOL) tablet 650 mg, 650 mg, Oral, Q6H PRN **OR** acetaminophen (TYLENOL) suppository 650 mg, 650 mg, Rectal, Q6H PRN, Kim George MD    polyethylene glycol (MIRALAX) packet 17 g, 17 g, Oral, DAILY PRN, Kim George MD    ondansetron (ZOFRAN ODT) tablet 4 mg, 4 mg, Oral, Q8H PRN **OR** ondansetron (ZOFRAN) injection 4 mg, 4 mg, IntraVENous, Q6H PRN, Kim George MD    piperacillin-tazobactam (ZOSYN) 3.375 g in 0.9% sodium chloride (MBP/ADV) 100 mL MBP, 3.375 g, IntraVENous, Q12H, Kim George MD, Last Rate: 200 mL/hr at 03/03/21 0840, 3.375 g at 03/03/21 0840

## 2021-03-04 LAB
ALBUMIN SERPL-MCNC: 2.9 G/DL (ref 3.5–5)
ALBUMIN/GLOB SERPL: 1 {RATIO} (ref 1.1–2.2)
ALP SERPL-CCNC: 70 U/L (ref 45–117)
ALT SERPL-CCNC: 72 U/L (ref 12–78)
ANION GAP SERPL CALC-SCNC: 14 MMOL/L (ref 5–15)
AST SERPL W P-5'-P-CCNC: 32 U/L (ref 15–37)
ATRIAL RATE: 79 BPM
ATRIAL RATE: 83 BPM
BASOPHILS # BLD: 0 K/UL (ref 0–0.1)
BASOPHILS NFR BLD: 0 % (ref 0–1)
BILIRUB SERPL-MCNC: 0.4 MG/DL (ref 0.2–1)
BUN SERPL-MCNC: 84 MG/DL (ref 6–20)
BUN/CREAT SERPL: 10 (ref 12–20)
CA-I BLD-MCNC: 8.2 MG/DL (ref 8.5–10.1)
CALCULATED P AXIS, ECG09: 36 DEGREES
CALCULATED P AXIS, ECG09: 70 DEGREES
CALCULATED R AXIS, ECG10: -13 DEGREES
CALCULATED R AXIS, ECG10: 18 DEGREES
CALCULATED T AXIS, ECG11: 23 DEGREES
CALCULATED T AXIS, ECG11: 59 DEGREES
CHLORIDE SERPL-SCNC: 94 MMOL/L (ref 97–108)
CO2 SERPL-SCNC: 23 MMOL/L (ref 21–32)
CREAT SERPL-MCNC: 8.01 MG/DL (ref 0.55–1.02)
DIAGNOSIS, 93000: NORMAL
DIAGNOSIS, 93000: NORMAL
DIFFERENTIAL METHOD BLD: ABNORMAL
EOSINOPHIL # BLD: 0 K/UL (ref 0–0.4)
EOSINOPHIL NFR BLD: 0 % (ref 0–7)
ERYTHROCYTE [DISTWIDTH] IN BLOOD BY AUTOMATED COUNT: 15.9 % (ref 11.5–14.5)
GLOBULIN SER CALC-MCNC: 2.9 G/DL (ref 2–4)
GLUCOSE BLD STRIP.AUTO-MCNC: 110 MG/DL (ref 65–100)
GLUCOSE BLD STRIP.AUTO-MCNC: 129 MG/DL (ref 65–100)
GLUCOSE BLD STRIP.AUTO-MCNC: 172 MG/DL (ref 65–100)
GLUCOSE BLD STRIP.AUTO-MCNC: 92 MG/DL (ref 65–100)
GLUCOSE SERPL-MCNC: 112 MG/DL (ref 65–100)
HBV SURFACE AB SER QL: NONREACTIVE
HBV SURFACE AB SER-ACNC: <3.1 MIU/ML
HBV SURFACE AG SER QL: <0.1 INDEX
HBV SURFACE AG SER QL: NEGATIVE
HCT VFR BLD AUTO: 34.5 % (ref 35–47)
HCV AB SER IA-ACNC: 0.03 INDEX
HCV AB SERPL QL IA: NONREACTIVE
HCV COMMENT,HCGAC: NORMAL
HGB BLD-MCNC: 11.1 G/DL (ref 11.5–16)
IMM GRANULOCYTES # BLD AUTO: 0 K/UL (ref 0–0.04)
IMM GRANULOCYTES NFR BLD AUTO: 1 % (ref 0–0.5)
LYMPHOCYTES # BLD: 0.4 K/UL (ref 0.8–3.5)
LYMPHOCYTES NFR BLD: 6 % (ref 12–49)
MCH RBC QN AUTO: 28 PG (ref 26–34)
MCHC RBC AUTO-ENTMCNC: 32.2 G/DL (ref 30–36.5)
MCV RBC AUTO: 86.9 FL (ref 80–99)
MONOCYTES # BLD: 0.4 K/UL (ref 0–1)
MONOCYTES NFR BLD: 7 % (ref 5–13)
NEUTS SEG # BLD: 4.9 K/UL (ref 1.8–8)
NEUTS SEG NFR BLD: 86 % (ref 32–75)
P-R INTERVAL, ECG05: 160 MS
P-R INTERVAL, ECG05: 166 MS
PERFORMED BY, TECHID: ABNORMAL
PERFORMED BY, TECHID: NORMAL
PLATELET # BLD AUTO: 181 K/UL (ref 150–400)
PMV BLD AUTO: 11.2 FL (ref 8.9–12.9)
POTASSIUM SERPL-SCNC: 4.5 MMOL/L (ref 3.5–5.1)
PROT SERPL-MCNC: 5.8 G/DL (ref 6.4–8.2)
Q-T INTERVAL, ECG07: 406 MS
Q-T INTERVAL, ECG07: 424 MS
QRS DURATION, ECG06: 78 MS
QRS DURATION, ECG06: 80 MS
QTC CALCULATION (BEZET), ECG08: 477 MS
QTC CALCULATION (BEZET), ECG08: 486 MS
RBC # BLD AUTO: 3.97 M/UL (ref 3.8–5.2)
SODIUM SERPL-SCNC: 131 MMOL/L (ref 136–145)
VANCOMYCIN SERPL-MCNC: 6 UG/ML
VENTRICULAR RATE, ECG03: 79 BPM
VENTRICULAR RATE, ECG03: 83 BPM
WBC # BLD AUTO: 5.7 K/UL (ref 3.6–11)

## 2021-03-04 PROCEDURE — 82962 GLUCOSE BLOOD TEST: CPT

## 2021-03-04 PROCEDURE — 80053 COMPREHEN METABOLIC PANEL: CPT

## 2021-03-04 PROCEDURE — 36415 COLL VENOUS BLD VENIPUNCTURE: CPT

## 2021-03-04 PROCEDURE — 85025 COMPLETE CBC W/AUTO DIFF WBC: CPT

## 2021-03-04 PROCEDURE — 74011250637 HC RX REV CODE- 250/637: Performed by: INTERNAL MEDICINE

## 2021-03-04 PROCEDURE — 74011250637 HC RX REV CODE- 250/637: Performed by: FAMILY MEDICINE

## 2021-03-04 PROCEDURE — 74011636637 HC RX REV CODE- 636/637: Performed by: INTERNAL MEDICINE

## 2021-03-04 PROCEDURE — 74011636637 HC RX REV CODE- 636/637: Performed by: FAMILY MEDICINE

## 2021-03-04 PROCEDURE — 74011250636 HC RX REV CODE- 250/636: Performed by: INTERNAL MEDICINE

## 2021-03-04 PROCEDURE — 90935 HEMODIALYSIS ONE EVALUATION: CPT

## 2021-03-04 PROCEDURE — 80202 ASSAY OF VANCOMYCIN: CPT

## 2021-03-04 PROCEDURE — 65270000029 HC RM PRIVATE

## 2021-03-04 PROCEDURE — 74011250636 HC RX REV CODE- 250/636

## 2021-03-04 RX ORDER — MIRTAZAPINE 15 MG/1
7.5 TABLET, FILM COATED ORAL
Status: DISCONTINUED | OUTPATIENT
Start: 2021-03-04 | End: 2021-03-08 | Stop reason: HOSPADM

## 2021-03-04 RX ORDER — HEPARIN SODIUM 1000 [USP'U]/ML
INJECTION, SOLUTION INTRAVENOUS; SUBCUTANEOUS
Status: COMPLETED
Start: 2021-03-04 | End: 2021-03-04

## 2021-03-04 RX ADMIN — ISOSORBIDE DINITRATE 20 MG: 20 TABLET ORAL at 21:28

## 2021-03-04 RX ADMIN — MIRTAZAPINE 7.5 MG: 15 TABLET, FILM COATED ORAL at 21:28

## 2021-03-04 RX ADMIN — PREDNISONE 20 MG: 20 TABLET ORAL at 11:08

## 2021-03-04 RX ADMIN — PREDNISONE 20 MG: 20 TABLET ORAL at 16:10

## 2021-03-04 RX ADMIN — CARVEDILOL 6.25 MG: 3.12 TABLET, FILM COATED ORAL at 16:08

## 2021-03-04 RX ADMIN — HEPARIN SODIUM 3600 UNITS: 1000 INJECTION, SOLUTION INTRAVENOUS; SUBCUTANEOUS at 08:27

## 2021-03-04 RX ADMIN — ISOSORBIDE DINITRATE 20 MG: 20 TABLET ORAL at 16:08

## 2021-03-04 RX ADMIN — CARVEDILOL 6.25 MG: 3.12 TABLET, FILM COATED ORAL at 11:08

## 2021-03-04 RX ADMIN — HYDRALAZINE HYDROCHLORIDE 25 MG: 25 TABLET ORAL at 16:08

## 2021-03-04 RX ADMIN — AMLODIPINE BESYLATE 10 MG: 5 TABLET ORAL at 11:08

## 2021-03-04 RX ADMIN — HYDRALAZINE HYDROCHLORIDE 25 MG: 25 TABLET ORAL at 21:27

## 2021-03-04 RX ADMIN — SEVELAMER CARBONATE 800 MG: 800 TABLET, FILM COATED ORAL at 11:08

## 2021-03-04 RX ADMIN — Medication 1 CAPSULE: at 11:08

## 2021-03-04 RX ADMIN — PANTOPRAZOLE SODIUM 40 MG: 40 TABLET, DELAYED RELEASE ORAL at 11:08

## 2021-03-04 RX ADMIN — HYDRALAZINE HYDROCHLORIDE 25 MG: 25 TABLET ORAL at 11:08

## 2021-03-04 RX ADMIN — SEVELAMER CARBONATE 800 MG: 800 TABLET, FILM COATED ORAL at 16:08

## 2021-03-04 RX ADMIN — ISOSORBIDE DINITRATE 20 MG: 20 TABLET ORAL at 11:08

## 2021-03-04 RX ADMIN — INSULIN LISPRO 3 UNITS: 100 INJECTION, SOLUTION INTRAVENOUS; SUBCUTANEOUS at 17:21

## 2021-03-04 RX ADMIN — DOBUTAMINE IN DEXTROSE 5 MCG/KG/MIN: 200 INJECTION, SOLUTION INTRAVENOUS at 01:20

## 2021-03-04 NOTE — PROGRESS NOTES
CM met with patient to discuss DCP. Patient is recc for [facility], patient agreeable, gave no preference, referral sent via [facility]. CM and patient discussed possible outpatient HD chair. She reported that she was not sure if she would be going outpatient for HD at this time. No referral made for at this time for outpatient HD. CM continues to follow.

## 2021-03-04 NOTE — PROGRESS NOTES
Comprehensive Nutrition Assessment    Type and Reason for Visit: RD nutrition re-screen/LOS    Nutrition Recommendations/Plan:   Continue current renal, Consistent Carbohydrate (Diabetic) diet  Allow extra condiments  Allow snacks as desired  Food preferences added    Adjust insulin to promote euglycemia  Please document % of all meal/snack consumed, BMs  Weekly measured body weight    Nutrition Assessment:  Admitted for hypoxia, intubated x1 day, transferred to medical unit 3/3. Last HD today, 2.5L removed. Interviewed pt at bedside today, endorsed improving appetite and able to eat >/=50% of meals. Stated poor taste of food vs cold temps are largest barrier to improved intakes, RD to add extra condiments to order. Pt denies need for further nutrition intervention at this time. Labs and meds reviewed. Malnutrition Assessment:  Malnutrition Status:  No malnutrition    Context:  Chronic illness     Findings of the 6 clinical characteristics of malnutrition:   Energy Intake:  No significant decrease in energy intake  Weight Loss:  Unable to assess     Body Fat Loss:  Unable to assess,     Muscle Mass Loss:  Unable to assess,    Fluid Accumulation:  No significant fluid accumulation,       Nutrition Related Findings:  NFPE not performed, pt appears thin. Pt denies issues with c/s. No N/V/D/C, last BM 3/2, watery. No edema. Wounds:    None       Current Nutrition Therapies:  DIET RENAL Regular; Consistent Carb 1500-1600kcal    Anthropometric Measures:  · Height:  5' 2.99\" (160 cm)  · Current Body Wt:  48.8 kg (107 lb 9.4 oz)(3/3)   · Admission Body Wt:  107 lb 9.4 oz(3/3)    · Usual Body Wt:  (molly)     · Ideal Body Wt:  115 lbs:  93.6 %   · BMI Category:  Underweight (BMI less than 22) age over 72     No wt hx to assess.      Nutrition Diagnosis:   No nutrition diagnosis at this time     Nutrition Interventions:   Food and/or Nutrient Delivery: Continue current diet  Nutrition Education and Counseling: No recommendations at this time  Coordination of Nutrition Care: Continue to monitor while inpatient    Nutrition Monitoring and Evaluation:   Behavioral-Environmental Outcomes: None identified  Food/Nutrient Intake Outcomes: Food and nutrient intake  Physical Signs/Symptoms Outcomes: Diarrhea, Weight    Discharge Planning:    No discharge needs at this time     Electronically signed by Joan Morrissey RD on 3/4/2021 at 12:44 PM    Contact: Ext [phone], or via [employer]

## 2021-03-04 NOTE — PROGRESS NOTES
PULMONARY ICU NOTE  [facility]     Name: Javier Acosta MRN: [id number]   : 1950 Hospital: [address] [place] [address]   Date: 3/5/2021  Admission date: 2021 Hospital Day: 8         HPI:                  Hospital Problems  Never Reviewed           Codes Class Noted POA     PNA (pneumonia) ICD-10-CM: J18.9  ICD-9-CM: 486   2021 Unknown           SOB (shortness of breath) ICD-10-CM: R06.02  ICD-9-CM: 786.05   2021 Unknown                          [x]? High complexity decision making was performed  [x]? See my orders for details        Subjective/Initial History:      I was asked by Sumaya Salazar MD to see Javier Acosta  a 72 y.o.  female in consultation      Excerpts from admission 2021 or consult notes as follows:   50-year-old lady came in because of shortness of breath and dyspnea significant past medical history of end-stage renal disease on hemodialysis, Goodpasture syndrome anemia of chronic disease hypertension steroid-dependent, she was not able to dialysis at home because of generalized weakness and shortness of breath she has been on home dialysis no fever no chills she was put on 100% nonrebreather mask which has been switched to noninvasive ventilator not she is going for dialysis because of severe hypoxia so pulmonary critical care consult was called for further evaluation. She is a lifetime non-smoker.        This morning on 21 she is seen sitting up in a chair, alert and oriented.  Denies, chest pain, shortness of breath, coughing, headache, or lightheadedness.      No Known Allergies      MAR reviewed and pertinent medications noted or modified as needed          Current Facility-Administered Medications   Medication    [START ON 3/4/2021] Vancomycin Random Level ordered for 3-4-21 AM    heparin (porcine) 1,000 unit/mL injection 3,600 Units    hydrALAZINE (APRESOLINE) 20 mg/mL injection 10 mg    amLODIPine (NORVASC) tablet 10 mg    hydrALAZINE (APRESOLINE) tablet 25 mg    carvediloL (COREG) tablet 6.25 mg    hydrOXYzine pamoate (VISTARIL) capsule 50 mg    VANCOMYCIN INFORMATION NOTE 1 Each    DOBUTamine (DOBUTREX) 500 mg/250 mL (2,000 mcg/mL) infusion    NOREPINephrine (LEVOPHED) 16,000 mcg in dextrose 5% 250 mL infusion    insulin lispro (HUMALOG) injection    glucose chewable tablet 16 g    dextrose (D50W) injection syrg 12.5-25 g    glucagon (GLUCAGEN) injection 1 mg    [Held by provider] azaTHIOprine (IMURAN) tablet 25 mg    propofol (DIPRIVAN) 10 mg/mL infusion    metoprolol (LOPRESSOR) injection 5 mg    methylPREDNISolone (PF) (SOLU-MEDROL) injection 40 mg    vitamin B complex capsule 1 Cap    pantoprazole (PROTONIX) tablet 40 mg    sevelamer carbonate (RENVELA) tab 800 mg    acetaminophen (TYLENOL) tablet 650 mg     Or    acetaminophen (TYLENOL) suppository 650 mg    polyethylene glycol (MIRALAX) packet 17 g    ondansetron (ZOFRAN ODT) tablet 4 mg     Or    ondansetron (ZOFRAN) injection 4 mg    piperacillin-tazobactam (ZOSYN) 3.375 g in 0.9% sodium chloride (MBP/ADV) 100 mL MBP      Patient PCP: None  PMH:  has a past medical history of Chronic kidney disease and Heart failure ([facility] [address] PSH:   has no past surgical history on file. FHX: family history is not on file. SHX:  reports that she has never smoked. She has never used smokeless tobacco. She reports previous alcohol use. She reports previous drug use. ROS:  Unable to obtain as patient was lethargic and severely short of breath        Objective:      Vital Signs: Telemetry:    normal sinus rhythm Intake/Output:   Visit Vitals       BP (!) 145/96 (BP 1 Location: Left upper arm, BP Patient Position: At rest)   Pulse (!) 53   Temp 97.5 °F (36.4 °C) Comment: Simultaneous filing. User may not have seen previous data.    Resp 18   Ht 5' 3\" (1.6 m)   Wt 48 kg (105 lb 13.1 oz)   SpO2 99%   BMI 18.75 kg/m²         Temp (24hrs), Av °F (36.7 °C), Min:97.5 °F (36.4 °C), Max:98.4 °F (36.9 °C)         O2 Device: Room air O2 Flow Rate (L/min): 0 l/min              Wt Readings from Last 4 Encounters:   03/02/21 48 kg (105 lb 13.1 oz)           No intake or output data in the 24 hours ending 03/03/21 0832     Last shift:      No intake/output data recorded. Last 3 shifts: 03/01 1901 - 03/03 0700  In: 200 [I.V.:200]  Out: -          Physical Exam:   Patient is on room air. Physical Exam   Constitutional: She appears slightly anxious. HENT:   Head: Normocephalic and atraumatic. Eyes: Pupils are equal, round, and reactive to light. EOM are normal.   Neck: Normal range of motion. Neck supple. Cardiovascular: Normal rate and regular rhythm. Pulmonary/Chest: Lungs CTA in all fields. Abdominal: Soft. Musculoskeletal: Normal range of motion. Neurological: She is alert.    Skin: Skin is warm.         Labs:           Recent Labs     03/03/21  0400 03/02/21  0530 03/01/21  0558   WBC 5.7 7.6 6.3   HGB 11.8 11.5 10.5*    155 143*               Recent Labs     03/04/21  0403 03/03/21  0400 03/02/21  0530 03/01/21  1245 03/01/21  0558    133* 132*  --  133*   K 4.5 4.8 5.0  --  5.3*   CL 94 95* 95*  --  97   CO2 23 27 24  --  25    145* 111*  --  192*   BUN 84 57* 80*  --  61*   CREA 8.01 6.21* 8.27*  --  6.94*   CA 8.2 8.2* 7.9*  --  7.8*   PHOS   --   --  6.9*  --    ALB  2.8* 3.0*  --  3.0*   ALT 72 75 73  --  75           Recent Labs     03/02/21  0436 03/01/21  0535   PH 7.43 7.42   PCO2 35 41   PO2 72* 237*   HCO3 24 26   FIO2 21.0 50.0      No results for input(s): CPK, CKNDX, TROIQ in the last 72 hours.     No lab exists for component: CPKMB  No results found for: BNPP, BNP         Lab Results   Component Value Date/Time     Culture result: No growth 2 days 02/28/2021 09:30 AM     Culture result: No growth 3 days 02/27/2021 01:58 AM     Culture result: No growth 3 days 02/27/2021 01:45 AM   No results found for: TSH, TSHEXT, TSHEXT     Imaging:             CXR Results  (Last 48 hours)                 03/02/21 0355   XR CHEST PORT Final result     Narrative:   Chest single view. Comparison single view chest March 1, 2021       ET tube and NG tube have been removed. Stable right-sided permacath. Unchanged   appearance for the lungs. No gross interstitial or alveolar pulmonary edema. Cardiac and mediastinal structures unchanged. No pneumothorax or sizable pleural   effusion.                  Results from Hospital Encounter encounter on 02/26/21   XR CHEST PORT     Narrative Chest single view.     Comparison single view chest March 1, 2021     ET tube and NG tube have been removed. Stable right-sided permacath. Unchanged  appearance for the lungs. No gross interstitial or alveolar pulmonary edema. Cardiac and mediastinal structures unchanged. No pneumothorax or sizable pleural  effusion. XR CHEST PORT     Narrative Portable chest, 0318 hours, compared with 2/28/2021.        Impression Stable appearance of endotracheal tube, right central dialysis  catheter, gastric tube, and temporary right ventricular pacing lead. Bilateral  pulmonary edema and small pleural effusions, consistent with clinical CHF,  subjectively slightly improved. No pneumothorax or other acute change. XR CHEST PORT     Narrative Upright radiograph chest 11:12 AM compared with 3/27/2021 at 7:19 AM.     INDICATION: Tachypnea, pneumonia.     Right IJ central line remains in place. Heart size is stable. Extensive  bilateral airspace disease, right greater than left, in a predominantly central  distribution shows minimal improvement compared to the previous study. Defibrillator pads project over the right upper chest and left lower chest. No  pneumothorax. Definite displaced fractures.  Next        Impression Persistent bilateral airspace disease showing slight improvement  compared to earlier study.           Results from [facility] [place] encounter on 02/26/21 CTA CHEST W OR W WO CONT     Narrative CT dose reduction was achieved through use of a standardized protocol tailored  for this examination and automatic exposure control for dose modulation.      Contrast study maximum intensity projections     There is a extensive diffuse lung disease. Dense consolidation is seen  throughout much of the right lower lobe and there is mild variable groundglass  opacification and small foci of dense consolidation scattered elsewhere  throughout much of each lung, right greater than left, with subpleural sparing,  mostly on the LEFT. Right middle lobe is disproportionately less involved, as is  the anterior left upper lobe. Central airways are open     Pulmonary arteries are densely opacified and show no filling defect or  distortion. Aorta shows normal dimensions, without dissection. No mediastinal or  hilar adenopathy. Small moderate symmetric pleural effusions. No pericardial  effusion. No significant abdominal finding. Body wall edema        Impression The findings may be a combination of extensive pneumonia,  pneumonitis and edema. No evidence of PE            IMPRESSION:   1. Acute hypoxic respiratory failure  2. History of Goodpasture syndrome  3. Severe cardiomyopathy ejection fraction about 17%  4. Fluid overload pulmonary edema  5. End-stage renal disease on hemodialysis  6. Neutropenia and hyperkalemia  7. Anemia  8. Bradycardia resolved  7. Body mass index is 18.75 kg/m². 9. Pt is requiring Drug therapy requiring intensive monitoring for toxicity  10. Pt is unstable, unpredictable needing inpatient monitoring; is acutely ill and at high risk of sudden decline and decompensation with severe consequenses and continued end organ dysfunction and failure  11. Prognosis guarded        RECOMMENDATIONS/PLAN:      1. Extubated on 3/1 patient is on room air now  2. CAT scan of the chest shows pulmonary edema CHF with prior history of Goodpasture syndrome.   3. Agree with dialysis 4. Discontinue Solu-Medrol start patient on prednisone  5. Patient is afebrile normal white count  6. 2D echo shows ejection fraction of 17% off aminophylline and dobutamine  7. Supplemental O2 to keep sats > 93%  8. Aspiration precautions  9. Labs to follow electrolytes, renal function and and blood counts  10. Glucose monitoring and SSI  11. Bronchial hygiene with respiratory therapy techniques, bronchodilators  12.  DVT, SUP prophylaxis

## 2021-03-04 NOTE — PROGRESS NOTES
Progress Note      3/4/2021 6:19 AM  NAME: Anuradha Fernandes   MRN:  [id number]   Admit Diagnosis: PNA (pneumonia) [J18.9]  SOB (shortness of breath) [R06.02]      Problem List:   1.  Incomplete database secondary to altered mental status  2.  Patient presents for shortness of breath   3.  Acute hypoxic respiratory failure requiring intubation-now extubated  4.  Pneumonia  5.  Fluid overload, pulmonary edema  6.  Goodpasture's syndrome  7.  End-stage renal disease dialysis dependent  8.  Cardiomyopathy (ejection fraction =17%)  9.  Grade I (mild) diastolic dysfunction, or impaired relaxation  10.  Mild pulmonary hypertension (RVSP =42 mmHg)     11.  Valvular heart disease         11a.  Mild to moderate tricuspid regurgitation         11b.  Mild pulmonic regurgitation         11c.  Mild to moderate mitral regurgitation  12.  Profound bradycardia corrected  13.  Possible seizure disorder  14.  Gastroesophageal reflux disease (GERD)  16.  Thrombocytopenia  17.  Electrolyte abnormalities (hyponatremia, hypochloremia, and hypocalcemia)       Subjective: The patient is seen and examined in room 463. There were no acute cardiovascular events reported overnight. Currently, she denies any cardiovascular complaints. I again discussed the need for left heart catheterization with the patient. She remains none committal.  The left heart catheterization may be done as an outpatient, or at [facility] if the patient and family prepares.     Medications Personally Reviewed:    Current Facility-Administered Medications   Medication Dose Route Frequency    predniSONE (DELTASONE) tablet 20 mg  20 mg Oral BID WITH MEALS    hydrALAZINE (APRESOLINE) tablet 25 mg  25 mg Oral TID    isosorbide dinitrate (ISORDIL) tablet 20 mg  20 mg Oral TID    Vancomycin Random Level ordered for 3-4-21 AM   Other ONCE    heparin (porcine) 1,000 unit/mL injection 3,600 Units  3,600 Units Hemodialysis Q TU, TH & SAT    hydrALAZINE (APRESOLINE) 20 mg/mL injection 10 mg  10 mg IntraVENous Q6H PRN    amLODIPine (NORVASC) tablet 10 mg  10 mg Oral DAILY    carvediloL (COREG) tablet 6.25 mg  6.25 mg Oral BID WITH MEALS    hydrOXYzine pamoate (VISTARIL) capsule 50 mg  50 mg Oral Q6H PRN    VANCOMYCIN INFORMATION NOTE 1 Each  1 Each Other Rx Dosing/Monitoring    DOBUTamine (DOBUTREX) 500 mg/250 mL (2,000 mcg/mL) infusion  5 mcg/kg/min IntraVENous CONTINUOUS    NOREPINephrine (LEVOPHED) 16,000 mcg in dextrose 5% 250 mL infusion  2-16 mcg/min IntraVENous TITRATE    insulin lispro (HUMALOG) injection   SubCUTAneous AC&HS    glucose chewable tablet 16 g  4 Tab Oral PRN    dextrose (D50W) injection syrg 12.5-25 g  25-50 mL IntraVENous PRN    glucagon (GLUCAGEN) injection 1 mg  1 mg IntraMUSCular PRN    [Held by provider] azaTHIOprine (IMURAN) tablet 25 mg  25 mg Oral DAILY    propofol (DIPRIVAN) 10 mg/mL infusion  0-50 mcg/kg/min IntraVENous TITRATE    metoprolol (LOPRESSOR) injection 5 mg  5 mg IntraVENous Q6H PRN    vitamin B complex capsule 1 Cap  1 Cap Oral DAILY    pantoprazole (PROTONIX) tablet 40 mg  40 mg Oral DAILY    sevelamer carbonate (RENVELA) tab 800 mg  800 mg Oral TID WITH MEALS    acetaminophen (TYLENOL) tablet 650 mg  650 mg Oral Q6H PRN    Or    acetaminophen (TYLENOL) suppository 650 mg  650 mg Rectal Q6H PRN    polyethylene glycol (MIRALAX) packet 17 g  17 g Oral DAILY PRN    ondansetron (ZOFRAN ODT) tablet 4 mg  4 mg Oral Q8H PRN    Or    ondansetron (ZOFRAN) injection 4 mg  4 mg IntraVENous Q6H PRN    piperacillin-tazobactam (ZOSYN) 3.375 g in 0.9% sodium chloride (MBP/ADV) 100 mL MBP  3.375 g IntraVENous Q12H           Objective:      Physical Exam:  Last 24hrs VS reviewed since prior progress note.  Most recent are:    Visit Vitals  BP (P) 127/76 (BP Patient Position: At rest)   Pulse (P) 87   Temp (P) 98.9 °F (37.2 °C)   Resp (P) 16   Ht 5' 3\" (1.6 m)   Wt 48.8 kg (107 lb 9.4 oz)   SpO2 (P) 97%   BMI 19.06 kg/m²     No intake or output data in the 24 hours ending 03/04/21 0619     General Appearance: Well developed, in no acute respiratory distress. Chest: Lungs clear to auscultation bilaterally. Cardiovascular: JVP is not elevated, PMI is not attempted, normal intensity S1-S2, without S3. Abdomen: Soft, non-tender, bowel sounds are active. Extremities: No edema bilaterally. Data Review    Telemetry: Sinus rhythm without ventricular ectopy  EKG:   [x]  No new EKG for review    Lab Data Personally Reviewed:    Recent Labs     03/04/21 0403 03/03/21 0400   WBC 5.7 5.7   HGB 11.1* 11.8   HCT 34.5* 36.5    176     No results for input(s): INR, PTP, APTT, INREXT in the last 72 hours. Recent Labs     03/04/21 0403 03/03/21 0400 03/02/21  0530   * 133* 132*   K 4.5 4.8 5.0   CL 94* 95* 95*   CO2 23 27 24   BUN 84* 57* 80*   CREA 8.01* 6.21* 8.27*   * 145* 111*   CA 8.2* 8.2* 7.9*     No results for input(s): CPK, CKNDX, TROIQ in the last 72 hours. No lab exists for component: CPKMB  No results found for: CHOL, CHOLX, CHLST, CHOLV, HDL, HDLP, LDL, LDLC, DLDLP, [place], CHHD, CHHDX    Recent Labs     03/04/21 0403 03/03/21  0400 03/02/21  0530   AP 70 55 64   TP 5.8* 5.6* 5.9*   ALB 2.9* 2.8* 3.0*   GLOB 2.9 2.8 2.9     Recent Labs     03/02/21  0436   PH 7.43   PCO2 35   PO2 72*           Assessment/Plan:   1. Continue telemetry monitor  2. Continue to monitor serum electrolytes, renal function  3. Continue current cardiovascular medications including amlodipine, carvedilol, heparin, hydralazine, insulin, and isosorbide  4.   Continue to follow with you while hospitalized     Sheri Vogel MD

## 2021-03-04 NOTE — PROGRESS NOTES
Report called to [address] [place] [address]. Vss. No signs distress. Patient  aware of transfer.

## 2021-03-04 NOTE — PROGRESS NOTES
Consult for Vancomycin Dosing by Pharmacy by . Consult provided for this 72y.o. year old female , for indication of aspiration pneumonia. Day of Therapy: 6  Goal of Level(s): 15-20mcg/dL    Other Current Antibiotics: Zosyn    Results       Procedure Component Value Units Date/Time    CULTURE, RESPIRATORY/SPUTUM/BRONCH Ruffus Aruna [[id number]] Collected: 02/28/21 0930    Order Status: Completed Specimen: Sputum Updated: 03/02/21 1233     Special Requests: No Special Requests        GRAM STAIN Occasional WBCs seen               Rare Epithelial cells seen            No organisms seen        Culture result: No growth 2 days       CULTURE, BLOOD #1 [[id number]] Collected: 02/27/21 0158    Order Status: Completed Specimen: Blood Updated: 03/03/21 0718     Special Requests: No Special Requests        Culture result: No growth 3 days       CULTURE, BLOOD #2 [[id number]] Collected: 02/27/21 0145    Order Status: Completed Specimen: Blood Updated: 03/03/21 0718     Special Requests: No Special Requests        Culture result: No growth 3 days       CULTURE, BLOOD, PAIRED [[id number]] Collected: 02/26/21 1430    Order Status: Completed Specimen: Blood Updated: 03/03/21 0718     Special Requests: No Special Requests        Culture result: No growth 4 days       CULTURE, BLOOD [[id number]] Collected: 02/26/21 1430    Order Status: Canceled Specimen: Blood     COVID-19 RAPID TEST [[id number]] Collected: 02/26/21 1336    Order Status: Completed Specimen: Nasopharyngeal Updated: 02/26/21 1409     Specimen source Nasopharyngeal        COVID-19 rapid test Not Detected        Comment: Rapid Abbott ID Now   Rapid NAAT:  The specimen is NEGATIVE for SARS-CoV-2, the novel coronavirus associated with COVID-19. Negative results should be treated as presumptive and, if inconsistent with clinical signs and symptoms or necessary for patient management, should be tested with an alternative molecular assay.  Negative results do not preclude SARS-CoV-2 infection and should not be used as the sole basis for patient management decisions. This test has been authorized by the FDA under   an Emergency Use Authorization (EUA) for use by authorized laboratories. Fact sheet for Healthcare Providers: ConventionUpdate.co.nz Fact sheet for Patients: ConventionUpdate.co.nz   Methodology: Isothermal Nucleic Acid Amplification                   Serum Creatinine Creatinine   Date Value Ref Range Status   03/04/2021 8.01 (H) 0.55 - 1.02 mg/dL Final   03/03/2021 6.21 (H) 0.55 - 1.02 mg/dL Final     Comment:     Investigated per delta check protocol   03/02/2021 8.27 (H) 0.55 - 1.02 mg/dL Final      Creatinine Clearance Estimated Creatinine Clearance: 5.1 mL/min (A) (based on SCr of 8.01 mg/dL (H)). BUN Lab Results   Component Value Date/Time    BUN 84 (H) 03/04/2021 04:03 AM      WBC Lab Results   Component Value Date/Time    WBC 5.7 03/04/2021 04:03 AM      Temp 98.6 °F (37 °C) (Oral)     Last Level: No results found for: VANCT   Vancomycin, random   Date Value Ref Range Status   03/04/2021 6.0 ug/mL Final     Comment:     No reference range has been established. Ht Readings from Last 1 Encounters:   02/26/21 160 cm (63\")        Wt Readings from Last 1 Encounters:   03/03/21 48.8 kg (107 lb 9.4 oz)     Ideal body weight: 52.4 kg (115 lb 8.3 oz)     New Regimen:   HD patient T-Th-Sat, today vancomycin level 6.0 before HD. Will order vancomycin 750mg IV x 1 post HD. Random level 3/6 with AM labs before HD. Pharmacy to follow daily and will make changes to dose and/or frequency based on clinical status.   _________________________________     Pharmacist DODIE [facility]

## 2021-03-04 NOTE — PROGRESS NOTES
General Daily Progress Note          Patient Name:   Aruna Johnson       YOB: 1950       Age:  72 y.o. Admit Date: 2/26/2021      Subjective:     Patient at dialysis. Doing well after extubation on 3/1, talking, eating, alert, and awake. Hbg 11.1  Na+ 131     Awaiting Hep B and C panels    Objective:     Visit Vitals  /85 (BP 1 Location: Left upper arm)   Pulse 97   Temp 97.8 °F (36.6 °C)   Resp 16   Ht 5' 3\" (1.6 m)   Wt 48.8 kg (107 lb 9.4 oz)   SpO2 98%   BMI 19.06 kg/m²        Recent Results (from the past 24 hour(s))   GLUCOSE, POC    Collection Time: 03/03/21  5:20 PM   Result Value Ref Range    Glucose (POC) 110 (H) 65 - 100 mg/dL    Performed by Orion Philip    GLUCOSE, POC    Collection Time: 03/03/21 11:39 PM   Result Value Ref Range    Glucose (POC) 150 (H) 65 - 100 mg/dL    Performed by Lionel Bence, RANDOM    Collection Time: 03/04/21  4:03 AM   Result Value Ref Range    Vancomycin, random 6.0 ug/mL   CBC WITH AUTOMATED DIFF    Collection Time: 03/04/21  4:03 AM   Result Value Ref Range    WBC 5.7 3.6 - 11.0 K/uL    RBC 3.97 3.80 - 5.20 M/uL    HGB 11.1 (L) 11.5 - 16.0 g/dL    HCT 34.5 (L) 35.0 - 47.0 %    MCV 86.9 80.0 - 99.0 FL    MCH 28.0 26.0 - 34.0 PG    MCHC 32.2 30.0 - 36.5 g/dL    RDW 15.9 (H) 11.5 - 14.5 %    PLATELET 406 844 - 768 K/uL    MPV 11.2 8.9 - 12.9 FL    NEUTROPHILS 86 (H) 32 - 75 %    LYMPHOCYTES 6 (L) 12 - 49 %    MONOCYTES 7 5 - 13 %    EOSINOPHILS 0 0 - 7 %    BASOPHILS 0 0 - 1 %    IMMATURE GRANULOCYTES 1 (H) 0.0 - 0.5 %    ABS. NEUTROPHILS 4.9 1.8 - 8.0 K/UL    ABS. LYMPHOCYTES 0.4 (L) 0.8 - 3.5 K/UL    ABS. MONOCYTES 0.4 0.0 - 1.0 K/UL    ABS. EOSINOPHILS 0.0 0.0 - 0.4 K/UL    ABS. BASOPHILS 0.0 0.0 - 0.1 K/UL    ABS. IMM.  GRANS. 0.0 0.00 - 0.04 K/UL    DF AUTOMATED     METABOLIC PANEL, COMPREHENSIVE    Collection Time: 03/04/21  4:03 AM   Result Value Ref Range    Sodium 131 (L) 136 - 145 mmol/L    Potassium 4.5 3.5 - 5.1 mmol/L    Chloride 94 (L) 97 - 108 mmol/L    CO2 23 21 - 32 mmol/L    Anion gap 14 5 - 15 mmol/L    Glucose 112 (H) 65 - 100 mg/dL    BUN 84 (H) 6 - 20 mg/dL    Creatinine 8.01 (H) 0.55 - 1.02 mg/dL    BUN/Creatinine ratio 10 (L) 12 - 20      GFR est AA 6 (L) >60 ml/min/1.73m2    GFR est non-AA 5 (L) >60 ml/min/1.73m2    Calcium 8.2 (L) 8.5 - 10.1 mg/dL    Bilirubin, total 0.4 0.2 - 1.0 mg/dL    AST (SGOT) 32 15 - 37 U/L    ALT (SGPT) 72 12 - 78 U/L    Alk. phosphatase 70 45 - 117 U/L    Protein, total 5.8 (L) 6.4 - 8.2 g/dL    Albumin 2.9 (L) 3.5 - 5.0 g/dL    Globulin 2.9 2.0 - 4.0 g/dL    A-G Ratio 1.0 (L) 1.1 - 2.2     GLUCOSE, POC    Collection Time: 03/04/21 11:15 AM   Result Value Ref Range    Glucose (POC) 92 65 - 100 mg/dL    Performed by Dewayne Baca      [unfilled]      Review of Systems  Patient is alert awake denies any chest pain or shortness of breath nausea no vomiting      Physical Exam:      Constitutional: V alert awake answer all simple questions   HENT:   Head: Normocephalic and atraumatic. Eyes: Pupils are equal, round, and reactive to light. EOM are normal.   Cardiovascular: Normal rate, regular rhythm and normal heart sounds. Pulmonary/Chest: Decreased breath sounds   abdominal: Soft. Bowel sounds are normal. There is no abdominal tenderness. There is no rebound and no guarding. Musculoskeletal: Normal range of motion. Neurological: Lethargic and sleepy    XR CHEST PORT   Final Result      XR CHEST PORT   Final Result   Stable appearance of endotracheal tube, right central dialysis   catheter, gastric tube, and temporary right ventricular pacing lead. Bilateral   pulmonary edema and small pleural effusions, consistent with clinical CHF,   subjectively slightly improved. No pneumothorax or other acute change. XR CHEST PORT   Final Result   Bilateral airspace disease/edema showing slight improvement compared   to the previous study.       XR CHEST PORT   Final Result Persistent bilateral airspace disease showing slight improvement   compared to earlier study. XR CHEST PORT   Final Result   Bilateral central airspace disease/edema right greater than left   showing slight worsening on the right. CTA CHEST W OR W WO CONT   Final Result   The findings may be a combination of extensive pneumonia,   pneumonitis and edema. No evidence of PE      XR CHEST SNGL V   Final Result           Recent Results (from the past 24 hour(s))   GLUCOSE, POC    Collection Time: 03/03/21  5:20 PM   Result Value Ref Range    Glucose (POC) 110 (H) 65 - 100 mg/dL    Performed by Bree Lopez    GLUCOSE, POC    Collection Time: 03/03/21 11:39 PM   Result Value Ref Range    Glucose (POC) 150 (H) 65 - 100 mg/dL    Performed by Maile Serrano, RANDOM    Collection Time: 03/04/21  4:03 AM   Result Value Ref Range    Vancomycin, random 6.0 ug/mL   CBC WITH AUTOMATED DIFF    Collection Time: 03/04/21  4:03 AM   Result Value Ref Range    WBC 5.7 3.6 - 11.0 K/uL    RBC 3.97 3.80 - 5.20 M/uL    HGB 11.1 (L) 11.5 - 16.0 g/dL    HCT 34.5 (L) 35.0 - 47.0 %    MCV 86.9 80.0 - 99.0 FL    MCH 28.0 26.0 - 34.0 PG    MCHC 32.2 30.0 - 36.5 g/dL    RDW 15.9 (H) 11.5 - 14.5 %    PLATELET 993 898 - 985 K/uL    MPV 11.2 8.9 - 12.9 FL    NEUTROPHILS 86 (H) 32 - 75 %    LYMPHOCYTES 6 (L) 12 - 49 %    MONOCYTES 7 5 - 13 %    EOSINOPHILS 0 0 - 7 %    BASOPHILS 0 0 - 1 %    IMMATURE GRANULOCYTES 1 (H) 0.0 - 0.5 %    ABS. NEUTROPHILS 4.9 1.8 - 8.0 K/UL    ABS. LYMPHOCYTES 0.4 (L) 0.8 - 3.5 K/UL    ABS. MONOCYTES 0.4 0.0 - 1.0 K/UL    ABS. EOSINOPHILS 0.0 0.0 - 0.4 K/UL    ABS. BASOPHILS 0.0 0.0 - 0.1 K/UL    ABS. IMM.  GRANS. 0.0 0.00 - 0.04 K/UL    DF AUTOMATED     METABOLIC PANEL, COMPREHENSIVE    Collection Time: 03/04/21  4:03 AM   Result Value Ref Range    Sodium 131 (L) 136 - 145 mmol/L    Potassium 4.5 3.5 - 5.1 mmol/L    Chloride 94 (L) 97 - 108 mmol/L    CO2 23 21 - 32 mmol/L    Anion gap 14 5 - 15 mmol/L    Glucose 112 (H) 65 - 100 mg/dL    BUN 84 (H) 6 - 20 mg/dL    Creatinine 8.01 (H) 0.55 - 1.02 mg/dL    BUN/Creatinine ratio 10 (L) 12 - 20      GFR est AA 6 (L) >60 ml/min/1.73m2    GFR est non-AA 5 (L) >60 ml/min/1.73m2    Calcium 8.2 (L) 8.5 - 10.1 mg/dL    Bilirubin, total 0.4 0.2 - 1.0 mg/dL    AST (SGOT) 32 15 - 37 U/L    ALT (SGPT) 72 12 - 78 U/L    Alk.  phosphatase 70 45 - 117 U/L    Protein, total 5.8 (L) 6.4 - 8.2 g/dL    Albumin 2.9 (L) 3.5 - 5.0 g/dL    Globulin 2.9 2.0 - 4.0 g/dL    A-G Ratio 1.0 (L) 1.1 - 2.2     GLUCOSE, POC    Collection Time: 03/04/21 11:15 AM   Result Value Ref Range    Glucose (POC) 92 65 - 100 mg/dL    Performed by Lindsey Duran        Results     Procedure Component Value Units Date/Time    CULTURE, RESPIRATORY/SPUTUM/BRONCH Benetta Balling [[id number]] Collected: 02/28/21 0930    Order Status: Completed Specimen: Sputum Updated: 03/02/21 1233     Special Requests: No Special Requests        GRAM STAIN Occasional WBCs seen               Rare Epithelial cells seen            No organisms seen        Culture result: No growth 2 days       CULTURE, BLOOD #1 [[id number]] Collected: 02/27/21 0158    Order Status: Completed Specimen: Blood Updated: 03/03/21 0718     Special Requests: No Special Requests        Culture result: No growth 3 days       CULTURE, BLOOD #2 [[id number]] Collected: 02/27/21 0145    Order Status: Completed Specimen: Blood Updated: 03/03/21 0718     Special Requests: No Special Requests        Culture result: No growth 3 days       CULTURE, BLOOD, PAIRED [[id number]] Collected: 02/26/21 1430    Order Status: Completed Specimen: Blood Updated: 03/03/21 0718     Special Requests: No Special Requests        Culture result: No growth 4 days       CULTURE, BLOOD [[id number]] Collected: 02/26/21 1430    Order Status: Canceled Specimen: Blood     COVID-19 RAPID TEST [[id number]] Collected: 02/26/21 1336    Order Status: Completed Specimen: Nasopharyngeal Updated: 02/26/21 1409     Specimen source Nasopharyngeal        COVID-19 rapid test Not Detected        Comment: Rapid Abbott ID Now   Rapid NAAT:  The specimen is NEGATIVE for SARS-CoV-2, the novel coronavirus associated with COVID-19. Negative results should be treated as presumptive and, if inconsistent with clinical signs and symptoms or necessary for patient management, should be tested with an alternative molecular assay. Negative results do not preclude SARS-CoV-2 infection and should not be used as the sole basis for patient management decisions. This test has been authorized by the FDA under   an Emergency Use Authorization (EUA) for use by authorized laboratories. Fact sheet for Healthcare Providers: ConventionUpdate.co.nz Fact sheet for Patients: ConventionUpdate.co.nz   Methodology: Isothermal Nucleic Acid Amplification                Labs:     Recent Labs     03/04/21 0403 03/03/21  0400   WBC 5.7 5.7   HGB 11.1* 11.8   HCT 34.5* 36.5    176     Recent Labs     03/04/21 0403 03/03/21  0400 03/02/21  0530 03/01/21  1245   * 133* 132*  --    K 4.5 4.8 5.0  --    CL 94* 95* 95*  --    CO2 23 27 24  --    BUN 84* 57* 80*  --    CREA 8.01* 6.21* 8.27*  --    * 145* 111*  --    CA 8.2* 8.2* 7.9*  --    PHOS  --   --   --  6.9*     Recent Labs     03/04/21 0403 03/03/21  0400 03/02/21  0530   ALT 72 75 73   AP 70 55 64   TBILI 0.4 0.4 0.4   TP 5.8* 5.6* 5.9*   ALB 2.9* 2.8* 3.0*   GLOB 2.9 2.8 2.9     No results for input(s): INR, PTP, APTT, INREXT, INREXT in the last 72 hours. No results for input(s): FE, TIBC, PSAT, FERR in the last 72 hours. No results found for: FOL, RBCF   Recent Labs     03/02/21  0436   PH 7.43   PCO2 35   PO2 72*     No results for input(s): CPK, CKNDX, TROIQ in the last 72 hours.     No lab exists for component: CPKMB  No results found for: CHOL, CHOLX, CHLST, CHOLV, HDL, HDLP, LDL, LDLC, DLDLP, TGLX, TRIGL, TRIGP, CHHD, [facility]  Lab Results   Component Value Date/Time    Glucose (POC) 92 03/04/2021 11:15 AM    Glucose (POC) 150 (H) 03/03/2021 11:39 PM    Glucose (POC) 110 (H) 03/03/2021 05:20 PM    Glucose (POC) 159 (H) 03/03/2021 11:06 AM    Glucose (POC) 133 (H) 03/03/2021 08:00 AM     No results found for: COLOR, APPRN, SPGRU, REFSG, YESY, PROTU, GLUCU, KETU, BILU, UROU, CARTER, LEUKU, GLUKE, EPSU, BACTU, WBCU, RBCU, CASTS, UCRY      Assessment:     Acute hypoxemic respiratory failure extubated this morning   severe symptomatic bradycardia on Toprol urinary sphincter  End-stage renal disease on hemodialysis at home  Hypotensive  Goodpasture's syndrome  Fluid overload  Hyperkalemia  Anemia of chronic disease      Plan:   Discontinued IV Solu-Medrol 40 mg IV every 8 hours  On prednisone 20 mg   Protonix 40 mg daily  D/c  dobutamine and Levophed  Discontinue Vanco and Zosyn as patient has no fever WBC counts are normal      Follow-up with nephrology cardiology PT/OT and pulmonologist    Cardiology recommends left heart catheterization, patient is noncommittal, may be done as outpatient   PT/OT recommend HHPT and HHOT with family care    I spent critical care time 30 minutes independently        Current Facility-Administered Medications:     predniSONE (DELTASONE) tablet 20 mg, 20 mg, Oral, BID WITH MEALS, Oumar Rodriguez MD, 20 mg at 03/04/21 1108    hydrALAZINE (APRESOLINE) tablet 25 mg, 25 mg, Oral, TID, Dilma Barnhart MD, 25 mg at 03/04/21 1108    isosorbide dinitrate (ISORDIL) tablet 20 mg, 20 mg, Oral, TID, Maximo SIMS MD, 20 mg at 03/04/21 1108    heparin (porcine) 1,000 unit/mL injection 3,600 Units, 3,600 Units, Hemodialysis, Q TU, TH & SAT, Cuauhtemoc Gonzalez MD, 3,600 Units at 03/04/21 0827    hydrALAZINE (APRESOLINE) 20 mg/mL injection 10 mg, 10 mg, IntraVENous, Q6H PRN, Radha, Jayme Parker, MD    amLODIPine (NORVASC) tablet 10 mg, 10 mg, Oral, DAILY, Dilma Barnhart MD, 10 mg at 03/04/21 1101   carvediloL (COREG) tablet 6.25 mg, 6.25 mg, Oral, BID WITH MEALS, Pradeep SIMS MD, 6.25 mg at 03/04/21 1108    hydrOXYzine pamoate (VISTARIL) capsule 50 mg, 50 mg, Oral, Q6H PRN, Kim George MD, 50 mg at 02/27/21 0528    DOBUTamine (DOBUTREX) 500 mg/250 mL (2,000 mcg/mL) infusion, 5 mcg/kg/min, IntraVENous, CONTINUOUS, Agyeman, Merlinda Pott, MD, Last Rate: 10.2 mL/hr at 03/04/21 0120, 5 mcg/kg/min at 03/04/21 0120    NOREPINephrine (LEVOPHED) 16,000 mcg in dextrose 5% 250 mL infusion, 2-16 mcg/min, IntraVENous, TITRATE, Lisa George MD    insulin lispro (HUMALOG) injection, , SubCUTAneous, AC&HS, Lisa George MD, Stopped at 03/04/21 1130    glucose chewable tablet 16 g, 4 Tab, Oral, PRN, Lisa George MD    dextrose (D50W) injection syrg 12.5-25 g, 25-50 mL, IntraVENous, PRN, Lisa George MD    glucagon (GLUCAGEN) injection 1 mg, 1 mg, IntraMUSCular, PRN, Abby More MD    [Held by provider] azaTHIOprine (IMURAN) tablet 25 mg, 25 mg, Oral, DAILY, Edil Taylor MD, Stopped at 02/28/21 0900    propofol (DIPRIVAN) 10 mg/mL infusion, 0-50 mcg/kg/min, IntraVENous, TITRATE, Lisa George MD, Stopped at 03/01/21 0815    metoprolol (LOPRESSOR) injection 5 mg, 5 mg, IntraVENous, Q6H PRN, Ovidio Carreno MD, 5 mg at 02/27/21 1646    vitamin B complex capsule 1 Cap, 1 Cap, Oral, DAILY, Kim George MD, 1 Cap at 03/04/21 1108    pantoprazole (PROTONIX) tablet 40 mg, 40 mg, Oral, DAILY, Kim George MD, 40 mg at 03/04/21 1108    sevelamer carbonate (RENVELA) tab 800 mg, 800 mg, Oral, TID WITH MEALS, Lisa George MD, 800 mg at 03/04/21 1108    acetaminophen (TYLENOL) tablet 650 mg, 650 mg, Oral, Q6H PRN **OR** acetaminophen (TYLENOL) suppository 650 mg, 650 mg, Rectal, Q6H PRN, Kim George MD    polyethylene glycol (MIRALAX) packet 17 g, 17 g, Oral, DAILY PRN, Kim George MD    ondansetron (ZOFRAN ODT) tablet 4 mg, 4 mg, Oral, Q8H PRN **OR** ondansetron (ZOFRAN) injection 4 mg, 4 mg, IntraVENous, Q6H PRN, Kim George MD    [Held by provider] piperacillin-tazobactam (ZOSYN) 3.375 g in 0.9% sodium chloride (MBP/ADV) 100 mL MBP, 3.375 g, IntraVENous, Q12H, Alice George MD, Stopped at 03/04/21 0900

## 2021-03-04 NOTE — PROGRESS NOTES
General Daily Progress Note          Patient Name:   Parmjit Fletcher       YOB: 1950       Age:  72 y.o. Admit Date: 2/26/2021      Subjective:     Patient at dialysis. Doing well after extubation on 3/1, talking, eating, alert, and awake. Hbg 11.1  Na+ 131     Awaiting Hep B and C panels    Objective:     Visit Vitals  /87   Pulse 89   Temp 98.6 °F (37 °C) (Oral)   Resp 20   Ht 5' 3\" (1.6 m)   Wt 48.8 kg (107 lb 9.4 oz)   SpO2 98%   BMI 19.06 kg/m²        Recent Results (from the past 24 hour(s))   GLUCOSE, POC    Collection Time: 03/03/21 11:06 AM   Result Value Ref Range    Glucose (POC) 159 (H) 65 - 100 mg/dL    Performed by Judson Mathias    GLUCOSE, POC    Collection Time: 03/03/21  5:20 PM   Result Value Ref Range    Glucose (POC) 110 (H) 65 - 100 mg/dL    Performed by Sharilyn Sicard    GLUCOSE, POC    Collection Time: 03/03/21 11:39 PM   Result Value Ref Range    Glucose (POC) 150 (H) 65 - 100 mg/dL    Performed by Mirian Tovar, RANDOM    Collection Time: 03/04/21  4:03 AM   Result Value Ref Range    Vancomycin, random 6.0 ug/mL   CBC WITH AUTOMATED DIFF    Collection Time: 03/04/21  4:03 AM   Result Value Ref Range    WBC 5.7 3.6 - 11.0 K/uL    RBC 3.97 3.80 - 5.20 M/uL    HGB 11.1 (L) 11.5 - 16.0 g/dL    HCT 34.5 (L) 35.0 - 47.0 %    MCV 86.9 80.0 - 99.0 FL    MCH 28.0 26.0 - 34.0 PG    MCHC 32.2 30.0 - 36.5 g/dL    RDW 15.9 (H) 11.5 - 14.5 %    PLATELET 950 781 - 513 K/uL    MPV 11.2 8.9 - 12.9 FL    NEUTROPHILS 86 (H) 32 - 75 %    LYMPHOCYTES 6 (L) 12 - 49 %    MONOCYTES 7 5 - 13 %    EOSINOPHILS 0 0 - 7 %    BASOPHILS 0 0 - 1 %    IMMATURE GRANULOCYTES 1 (H) 0.0 - 0.5 %    ABS. NEUTROPHILS 4.9 1.8 - 8.0 K/UL    ABS. LYMPHOCYTES 0.4 (L) 0.8 - 3.5 K/UL    ABS. MONOCYTES 0.4 0.0 - 1.0 K/UL    ABS. EOSINOPHILS 0.0 0.0 - 0.4 K/UL    ABS. BASOPHILS 0.0 0.0 - 0.1 K/UL    ABS. IMM.  GRANS. 0.0 0.00 - 0.04 K/UL    DF AUTOMATED     METABOLIC PANEL, COMPREHENSIVE    Collection Time: 03/04/21  4:03 AM   Result Value Ref Range    Sodium 131 (L) 136 - 145 mmol/L    Potassium 4.5 3.5 - 5.1 mmol/L    Chloride 94 (L) 97 - 108 mmol/L    CO2 23 21 - 32 mmol/L    Anion gap 14 5 - 15 mmol/L    Glucose 112 (H) 65 - 100 mg/dL    BUN 84 (H) 6 - 20 mg/dL    Creatinine 8.01 (H) 0.55 - 1.02 mg/dL    BUN/Creatinine ratio 10 (L) 12 - 20      GFR est AA 6 (L) >60 ml/min/1.73m2    GFR est non-AA 5 (L) >60 ml/min/1.73m2    Calcium 8.2 (L) 8.5 - 10.1 mg/dL    Bilirubin, total 0.4 0.2 - 1.0 mg/dL    AST (SGOT) 32 15 - 37 U/L    ALT (SGPT) 72 12 - 78 U/L    Alk. phosphatase 70 45 - 117 U/L    Protein, total 5.8 (L) 6.4 - 8.2 g/dL    Albumin 2.9 (L) 3.5 - 5.0 g/dL    Globulin 2.9 2.0 - 4.0 g/dL    A-G Ratio 1.0 (L) 1.1 - 2.2       [unfilled]      Review of Systems  Patient is alert awake denies any chest pain or shortness of breath nausea no vomiting      Physical Exam:      Constitutional: V alert awake answer all simple questions   HENT:   Head: Normocephalic and atraumatic. Eyes: Pupils are equal, round, and reactive to light. EOM are normal.   Cardiovascular: Normal rate, regular rhythm and normal heart sounds. Pulmonary/Chest: Decreased breath sounds   abdominal: Soft. Bowel sounds are normal. There is no abdominal tenderness. There is no rebound and no guarding. Musculoskeletal: Normal range of motion. Neurological: Lethargic and sleepy    XR CHEST PORT   Final Result      XR CHEST PORT   Final Result   Stable appearance of endotracheal tube, right central dialysis   catheter, gastric tube, and temporary right ventricular pacing lead. Bilateral   pulmonary edema and small pleural effusions, consistent with clinical CHF,   subjectively slightly improved. No pneumothorax or other acute change. XR CHEST PORT   Final Result   Bilateral airspace disease/edema showing slight improvement compared   to the previous study.       XR CHEST PORT   Final Result   Persistent bilateral airspace disease showing slight improvement   compared to earlier study. XR CHEST PORT   Final Result   Bilateral central airspace disease/edema right greater than left   showing slight worsening on the right. CTA CHEST W OR W WO CONT   Final Result   The findings may be a combination of extensive pneumonia,   pneumonitis and edema. No evidence of PE      XR CHEST SNGL V   Final Result           Recent Results (from the past 24 hour(s))   GLUCOSE, POC    Collection Time: 03/03/21 11:06 AM   Result Value Ref Range    Glucose (POC) 159 (H) 65 - 100 mg/dL    Performed by Farida Le    GLUCOSE, POC    Collection Time: 03/03/21  5:20 PM   Result Value Ref Range    Glucose (POC) 110 (H) 65 - 100 mg/dL    Performed by Estelle Kelly    GLUCOSE, POC    Collection Time: 03/03/21 11:39 PM   Result Value Ref Range    Glucose (POC) 150 (H) 65 - 100 mg/dL    Performed by Cristin Chen, RANDOM    Collection Time: 03/04/21  4:03 AM   Result Value Ref Range    Vancomycin, random 6.0 ug/mL   CBC WITH AUTOMATED DIFF    Collection Time: 03/04/21  4:03 AM   Result Value Ref Range    WBC 5.7 3.6 - 11.0 K/uL    RBC 3.97 3.80 - 5.20 M/uL    HGB 11.1 (L) 11.5 - 16.0 g/dL    HCT 34.5 (L) 35.0 - 47.0 %    MCV 86.9 80.0 - 99.0 FL    MCH 28.0 26.0 - 34.0 PG    MCHC 32.2 30.0 - 36.5 g/dL    RDW 15.9 (H) 11.5 - 14.5 %    PLATELET 732 614 - 721 K/uL    MPV 11.2 8.9 - 12.9 FL    NEUTROPHILS 86 (H) 32 - 75 %    LYMPHOCYTES 6 (L) 12 - 49 %    MONOCYTES 7 5 - 13 %    EOSINOPHILS 0 0 - 7 %    BASOPHILS 0 0 - 1 %    IMMATURE GRANULOCYTES 1 (H) 0.0 - 0.5 %    ABS. NEUTROPHILS 4.9 1.8 - 8.0 K/UL    ABS. LYMPHOCYTES 0.4 (L) 0.8 - 3.5 K/UL    ABS. MONOCYTES 0.4 0.0 - 1.0 K/UL    ABS. EOSINOPHILS 0.0 0.0 - 0.4 K/UL    ABS. BASOPHILS 0.0 0.0 - 0.1 K/UL    ABS. IMM.  GRANS. 0.0 0.00 - 0.04 K/UL    DF AUTOMATED     METABOLIC PANEL, COMPREHENSIVE    Collection Time: 03/04/21  4:03 AM   Result Value Ref Range Sodium 131 (L) 136 - 145 mmol/L    Potassium 4.5 3.5 - 5.1 mmol/L    Chloride 94 (L) 97 - 108 mmol/L    CO2 23 21 - 32 mmol/L    Anion gap 14 5 - 15 mmol/L    Glucose 112 (H) 65 - 100 mg/dL    BUN 84 (H) 6 - 20 mg/dL    Creatinine 8.01 (H) 0.55 - 1.02 mg/dL    BUN/Creatinine ratio 10 (L) 12 - 20      GFR est AA 6 (L) >60 ml/min/1.73m2    GFR est non-AA 5 (L) >60 ml/min/1.73m2    Calcium 8.2 (L) 8.5 - 10.1 mg/dL    Bilirubin, total 0.4 0.2 - 1.0 mg/dL    AST (SGOT) 32 15 - 37 U/L    ALT (SGPT) 72 12 - 78 U/L    Alk.  phosphatase 70 45 - 117 U/L    Protein, total 5.8 (L) 6.4 - 8.2 g/dL    Albumin 2.9 (L) 3.5 - 5.0 g/dL    Globulin 2.9 2.0 - 4.0 g/dL    A-G Ratio 1.0 (L) 1.1 - 2.2         Results     Procedure Component Value Units Date/Time    CULTURE, RESPIRATORY/SPUTUM/BRONCH Mildred Shoemaker [[id number]] Collected: 02/28/21 0930    Order Status: Completed Specimen: Sputum Updated: 03/02/21 1233     Special Requests: No Special Requests        GRAM STAIN Occasional WBCs seen               Rare Epithelial cells seen            No organisms seen        Culture result: No growth 2 days       CULTURE, BLOOD #1 [[id number]] Collected: 02/27/21 0158    Order Status: Completed Specimen: Blood Updated: 03/03/21 0718     Special Requests: No Special Requests        Culture result: No growth 3 days       CULTURE, BLOOD #2 [[id number]] Collected: 02/27/21 0145    Order Status: Completed Specimen: Blood Updated: 03/03/21 0718     Special Requests: No Special Requests        Culture result: No growth 3 days       CULTURE, BLOOD, PAIRED [[id number]] Collected: 02/26/21 1430    Order Status: Completed Specimen: Blood Updated: 03/03/21 0718     Special Requests: No Special Requests        Culture result: No growth 4 days       CULTURE, BLOOD [[id number]] Collected: 02/26/21 1430    Order Status: Canceled Specimen: Blood     COVID-19 RAPID TEST [[id number]] Collected: 02/26/21 1336    Order Status: Completed Specimen: Nasopharyngeal Updated: 02/26/21 1409     Specimen source Nasopharyngeal        COVID-19 rapid test Not Detected        Comment: Rapid Abbott ID Now   Rapid NAAT:  The specimen is NEGATIVE for SARS-CoV-2, the novel coronavirus associated with COVID-19. Negative results should be treated as presumptive and, if inconsistent with clinical signs and symptoms or necessary for patient management, should be tested with an alternative molecular assay. Negative results do not preclude SARS-CoV-2 infection and should not be used as the sole basis for patient management decisions. This test has been authorized by the FDA under   an Emergency Use Authorization (EUA) for use by authorized laboratories. Fact sheet for Healthcare Providers: ConventionUpdate.co.nz Fact sheet for Patients: ConventionUpdate.co.nz   Methodology: Isothermal Nucleic Acid Amplification                Labs:     Recent Labs     03/04/21 0403 03/03/21  0400   WBC 5.7 5.7   HGB 11.1* 11.8   HCT 34.5* 36.5    176     Recent Labs     03/04/21 0403 03/03/21  0400 03/02/21  0530 03/01/21  1245   * 133* 132*  --    K 4.5 4.8 5.0  --    CL 94* 95* 95*  --    CO2 23 27 24  --    BUN 84* 57* 80*  --    CREA 8.01* 6.21* 8.27*  --    * 145* 111*  --    CA 8.2* 8.2* 7.9*  --    PHOS  --   --   --  6.9*     Recent Labs     03/04/21 0403 03/03/21  0400 03/02/21  0530   ALT 72 75 73   AP 70 55 64   TBILI 0.4 0.4 0.4   TP 5.8* 5.6* 5.9*   ALB 2.9* 2.8* 3.0*   GLOB 2.9 2.8 2.9     No results for input(s): INR, PTP, APTT, INREXT, INREXT in the last 72 hours. No results for input(s): FE, TIBC, PSAT, FERR in the last 72 hours. No results found for: FOL, RBCF   Recent Labs     03/02/21  0436   PH 7.43   PCO2 35   PO2 72*     No results for input(s): CPK, CKNDX, TROIQ in the last 72 hours.     No lab exists for component: CPKMB  No results found for: CHOL, CHOLX, CHLST, CHOLV, HDL, HDLP, LDL, LDLC, DLDLP, TGLX, TRIGL, TRIGP, [facility], [facility]  Lab Results   Component Value Date/Time    Glucose (POC) 150 (H) 03/03/2021 11:39 PM    Glucose (POC) 110 (H) 03/03/2021 05:20 PM    Glucose (POC) 159 (H) 03/03/2021 11:06 AM    Glucose (POC) 133 (H) 03/03/2021 08:00 AM    Glucose (POC) 133 (H) 03/02/2021 09:52 PM     No results found for: COLOR, APPRN, SPGRU, REFSG, YESY, PROTU, GLUCU, KETU, BILU, UROU, CARTER, LEUKU, GLUKE, EPSU, BACTU, WBCU, RBCU, CASTS, UCRY      Assessment:     Acute hypoxemic respiratory failure extubated this morning   severe symptomatic bradycardia on Toprol urinary sphincter  End-stage renal disease on hemodialysis at home  Hypotensive  Goodpasture's syndrome  Fluid overload  Hyperkalemia  Anemia of chronic disease      Plan:   Discontinued IV Solu-Medrol 40 mg IV every 8 hours  On prednisone 20 mg   Protonix 40 mg daily  D/c  dobutamine and Levophed  On vancomycin and IV Zosyn    Follow-up with nephrology cardiology PT/OT and pulmonologist    Cardiology recommends left heart catheterization, patient is noncommittal, may be done as outpatient   PT/OT recommend HHPT and HHOT with family care    I spent critical care time 30 minutes independently        Current Facility-Administered Medications:     predniSONE (DELTASONE) tablet 20 mg, 20 mg, Oral, BID WITH MEALS, Nasir Rajput MD, 20 mg at 03/03/21 1725    hydrALAZINE (APRESOLINE) tablet 25 mg, 25 mg, Oral, TID, Julisa Lerner MD, 25 mg at 03/03/21 2343    isosorbide dinitrate (ISORDIL) tablet 20 mg, 20 mg, Oral, TID, Julisa Lerner MD, 20 mg at 03/03/21 2346    Vancomycin Random Level ordered for 3-4-21 AM, , Other, ONCE, Kim George MD    heparin (porcine) 1,000 unit/mL injection 3,600 Units, 3,600 Units, Hemodialysis, Q TU, TH & SAT, Anali Gonzalez MD, 3,600 Units at 03/04/21 0827    hydrALAZINE (APRESOLINE) 20 mg/mL injection 10 mg, 10 mg, IntraVENous, Q6H PRN, Sandeep Garcia MD    amLODIPine (NORVASC) tablet 10 mg, 10 mg, Oral, DAILY, Radha Modesta Cline MD, 10 mg at 03/03/21 0839    carvediloL (COREG) tablet 6.25 mg, 6.25 mg, Oral, BID WITH MEALS, Bernarda SIMS MD, Stopped at 03/03/21 0800    hydrOXYzine pamoate (VISTARIL) capsule 50 mg, 50 mg, Oral, Q6H PRN, Kim George MD, 50 mg at 02/27/21 0528    VANCOMYCIN INFORMATION NOTE 1 Each, 1 Each, Other, Rx Dosing/Monitoring, Kim George MD    DOBUTamine (DOBUTREX) 500 mg/250 mL (2,000 mcg/mL) infusion, 5 mcg/kg/min, IntraVENous, CONTINUOUS, Radha, Modesta Cline MD, Last Rate: 10.2 mL/hr at 03/04/21 0120, 5 mcg/kg/min at 03/04/21 0120    NOREPINephrine (LEVOPHED) 16,000 mcg in dextrose 5% 250 mL infusion, 2-16 mcg/min, IntraVENous, TITRATE, Kacey George MD    insulin lispro (HUMALOG) injection, , SubCUTAneous, AC&HS, Kim George MD, 3 Units at 03/03/21 2347    glucose chewable tablet 16 g, 4 Tab, Oral, PRN, Kim George MD    dextrose (D50W) injection syrg 12.5-25 g, 25-50 mL, IntraVENous, PRN, Kacey George MD    glucagon (GLUCAGEN) injection 1 mg, 1 mg, IntraMUSCular, PRN, Alix Altamirano MD    [Held by provider] azaTHIOprine (IMURAN) tablet 25 mg, 25 mg, Oral, DAILY, Kaitlin Winter MD, Stopped at 02/28/21 0900    propofol (DIPRIVAN) 10 mg/mL infusion, 0-50 mcg/kg/min, IntraVENous, TITRATE, Kim George MD, Stopped at 03/01/21 0815    metoprolol (LOPRESSOR) injection 5 mg, 5 mg, IntraVENous, Q6H PRN, Bernarda SIMS MD, 5 mg at 02/27/21 1646    vitamin B complex capsule 1 Cap, 1 Cap, Oral, DAILY, Kmi George MD, 1 Cap at 03/03/21 1207    pantoprazole (PROTONIX) tablet 40 mg, 40 mg, Oral, DAILY, Kim George MD, 40 mg at 03/03/21 1935    sevelamer carbonate (RENVELA) tab 800 mg, 800 mg, Oral, TID WITH MEALS, Kim George MD, 800 mg at 03/03/21 1725    acetaminophen (TYLENOL) tablet 650 mg, 650 mg, Oral, Q6H PRN **OR** acetaminophen (TYLENOL) suppository 650 mg, 650 mg, Rectal, Q6H PRN, MD Corine Dean polyethylene glycol (MIRALAX) packet 17 g, 17 g, Oral, DAILY PRN, Jorge L George MD    ondansetron (ZOFRAN ODT) tablet 4 mg, 4 mg, Oral, Q8H PRN **OR** ondansetron (ZOFRAN) injection 4 mg, 4 mg, IntraVENous, Q6H PRN, Kim George MD    piperacillin-tazobactam (ZOSYN) 3.375 g in 0.9% sodium chloride (MBP/ADV) 100 mL MBP, 3.375 g, IntraVENous, Q12H, Kim George MD, Last Rate: 200 mL/hr at 03/03/21 2347, 3.375 g at 03/03/21 2347

## 2021-03-04 NOTE — PROGRESS NOTES
Renal Daily Progress Note    Admit Date: 2/26/2021      Subjective:   She was seen on hemodialysis today. She reports that she has not been eating well. She has a cough that is minimally productive. Denies hemoptysis.       Current Facility-Administered Medications   Medication Dose Route Frequency    predniSONE (DELTASONE) tablet 20 mg  20 mg Oral BID WITH MEALS    hydrALAZINE (APRESOLINE) tablet 25 mg  25 mg Oral TID    isosorbide dinitrate (ISORDIL) tablet 20 mg  20 mg Oral TID    heparin (porcine) 1,000 unit/mL injection 3,600 Units  3,600 Units Hemodialysis Q TU, TH & SAT    hydrALAZINE (APRESOLINE) 20 mg/mL injection 10 mg  10 mg IntraVENous Q6H PRN    amLODIPine (NORVASC) tablet 10 mg  10 mg Oral DAILY    carvediloL (COREG) tablet 6.25 mg  6.25 mg Oral BID WITH MEALS    hydrOXYzine pamoate (VISTARIL) capsule 50 mg  50 mg Oral Q6H PRN    DOBUTamine (DOBUTREX) 500 mg/250 mL (2,000 mcg/mL) infusion  5 mcg/kg/min IntraVENous CONTINUOUS    NOREPINephrine (LEVOPHED) 16,000 mcg in dextrose 5% 250 mL infusion  2-16 mcg/min IntraVENous TITRATE    insulin lispro (HUMALOG) injection   SubCUTAneous AC&HS    glucose chewable tablet 16 g  4 Tab Oral PRN    dextrose (D50W) injection syrg 12.5-25 g  25-50 mL IntraVENous PRN    glucagon (GLUCAGEN) injection 1 mg  1 mg IntraMUSCular PRN    [Held by provider] azaTHIOprine (IMURAN) tablet 25 mg  25 mg Oral DAILY    propofol (DIPRIVAN) 10 mg/mL infusion  0-50 mcg/kg/min IntraVENous TITRATE    metoprolol (LOPRESSOR) injection 5 mg  5 mg IntraVENous Q6H PRN    vitamin B complex capsule 1 Cap  1 Cap Oral DAILY    pantoprazole (PROTONIX) tablet 40 mg  40 mg Oral DAILY    sevelamer carbonate (RENVELA) tab 800 mg  800 mg Oral TID WITH MEALS    acetaminophen (TYLENOL) tablet 650 mg  650 mg Oral Q6H PRN    Or    acetaminophen (TYLENOL) suppository 650 mg  650 mg Rectal Q6H PRN    polyethylene glycol (MIRALAX) packet 17 g  17 g Oral DAILY PRN    ondansetron (ZOFRAN ODT) tablet 4 mg  4 mg Oral Q8H PRN    Or    ondansetron (ZOFRAN) injection 4 mg  4 mg IntraVENous Q6H PRN    [Held by provider] piperacillin-tazobactam (ZOSYN) 3.375 g in 0.9% sodium chloride (MBP/ADV) 100 mL MBP  3.375 g IntraVENous Q12H        Review of Systems    Review of Systems   Respiratory: Negative for shortness of breath. Cardiovascular: Negative for chest pain. Neurological: Negative for headaches. Objective:     Patient Vitals for the past 8 hrs:   BP Temp Temp src Pulse Resp SpO2   03/04/21 1114 132/85 97.8 °F (36.6 °C)  97 16    03/04/21 1045 135/80 98.6 °F (37 °C) Oral 89 18    03/04/21 1015 (!) 134/93   (!) 101     03/04/21 0945 130/87   94     03/04/21 0915 134/87   89     03/04/21 0845 130/79   85     03/04/21 0815 133/87   84     03/04/21 0745 135/80 98.6 °F (37 °C) Oral 77 20    03/04/21 0715 130/76 98 °F (36.7 °C)  77 20 98 %   03/04/21 0503 127/76 98.9 °F (37.2 °C)  87 16 97 %     03/04 0701 - 03/04 1900  In: -   Out: 2500   No intake/output data recorded. Physical Exam:   Physical Exam   Neck: No JVD present. Cardiovascular: Regular rhythm. Pulmonary/Chest: Breath sounds normal.   Abdominal: Soft. Bowel sounds are normal.   Musculoskeletal:         General: No edema. Neurological: She is alert. Skin: Skin is warm. Right IJ permacath functioning well        XR CHEST PORT   Final Result      XR CHEST PORT   Final Result   Stable appearance of endotracheal tube, right central dialysis   catheter, gastric tube, and temporary right ventricular pacing lead. Bilateral   pulmonary edema and small pleural effusions, consistent with clinical CHF,   subjectively slightly improved. No pneumothorax or other acute change. XR CHEST PORT   Final Result   Bilateral airspace disease/edema showing slight improvement compared   to the previous study.       XR CHEST PORT   Final Result   Persistent bilateral airspace disease showing slight improvement   compared to earlier study. XR CHEST PORT   Final Result   Bilateral central airspace disease/edema right greater than left   showing slight worsening on the right. CTA CHEST W OR W WO CONT   Final Result   The findings may be a combination of extensive pneumonia,   pneumonitis and edema. No evidence of PE      XR CHEST SNGL V   Final Result           Data Review   Recent Labs     03/04/21  0403 03/03/21  0400 03/02/21  0530   WBC 5.7 5.7 7.6   HGB 11.1* 11.8 11.5   HCT 34.5* 36.5 35.8    176 155     Recent Labs     03/04/21  0403 03/03/21  0400 03/02/21  0530   * 133* 132*   K 4.5 4.8 5.0   CL 94* 95* 95*   CO2 23 27 24   * 145* 111*   BUN 84* 57* 80*   CREA 8.01* 6.21* 8.27*   CA 8.2* 8.2* 7.9*   ALB 2.9* 2.8* 3.0*   ALT 72 75 73     No components found for: GLPOC  Recent Labs     03/02/21  0436   PH 7.43   PCO2 35   PO2 72*   HCO3 24   FIO2 21.0     No results for input(s): INR, INREXT, INREXT, INREXT in the last 72 hours. Assessment:           Active Problems:    PNA (pneumonia) (2/26/2021)      SOB (shortness of breath) (2/26/2021)    ESRD hemodialysis dependent  History of Goodpasture's syndrome  Anemia  Thrombocytopenia- resolved. Hypertension- under better control  Cardiomyopathy  Secondary hyperparathyroidism  Hyponatremia  Plan:   Hemodialysis on a Tuesday Thursday Saturday schedule. Will check hepatitis panel in preparation for in center hemodialysis. Case management to see her regarding inpatient dialysis chair  She has been on home hemodialysis and would perhaps do better at least for the short-term with in center hemodialysis on discharge.

## 2021-03-04 NOTE — CONSULTS
PULMONARY NOTE  [facility]    Name: Alan Cano MRN: [id number]   : 1950 Hospital: [address] [place] [address]   Date: 3/4/2021  Admission date: 2021 Hospital Day: 7       HPI:     Hospital Problems  Never Reviewed          Codes Class Noted POA    PNA (pneumonia) ICD-10-CM: J18.9  ICD-9-CM: 486  2021 Unknown        SOB (shortness of breath) ICD-10-CM: R06.02  ICD-9-CM: 786.05  2021 Unknown                   [x] High complexity decision making was performed  [x] See my orders for details      Subjective/Initial History:     I was asked by Radha Conteh MD to see Alan Cano  a 72 y.o.  female in consultation     Excerpts from admission 2021 or consult notes as follows:   71-year-old lady came in because of shortness of breath and dyspnea significant past medical history of end-stage renal disease on hemodialysis, Goodpasture syndrome anemia of chronic disease hypertension steroid-dependent, she was not able to dialysis at home because of generalized weakness and shortness of breath she has been on home dialysis no fever no chills she was put on 100% nonrebreather mask which has been switched to noninvasive ventilator not she is going for dialysis because of severe hypoxia so pulmonary critical care consult was called for further evaluation. She is a lifetime non-smoker.       No Known Allergies     MAR reviewed and pertinent medications noted or modified as needed     Current Facility-Administered Medications   Medication    vancomycin (VANCOCIN) 750 mg in 0.9% sodium chloride 250 mL (VIAL-MATE)    Vancomycin random level 3/6 at 04:00     predniSONE (DELTASONE) tablet 20 mg    hydrALAZINE (APRESOLINE) tablet 25 mg    isosorbide dinitrate (ISORDIL) tablet 20 mg    heparin (porcine) 1,000 unit/mL injection 3,600 Units    hydrALAZINE (APRESOLINE) 20 mg/mL injection 10 mg    amLODIPine (NORVASC) tablet 10 mg    carvediloL (COREG) tablet 6.25 mg    hydrOXYzine pamoate (VISTARIL) capsule 50 mg    VANCOMYCIN INFORMATION NOTE 1 Each    DOBUTamine (DOBUTREX) 500 mg/250 mL (2,000 mcg/mL) infusion    NOREPINephrine (LEVOPHED) 16,000 mcg in dextrose 5% 250 mL infusion    insulin lispro (HUMALOG) injection    glucose chewable tablet 16 g    dextrose (D50W) injection syrg 12.5-25 g    glucagon (GLUCAGEN) injection 1 mg    [Held by provider] azaTHIOprine (IMURAN) tablet 25 mg    propofol (DIPRIVAN) 10 mg/mL infusion    metoprolol (LOPRESSOR) injection 5 mg    vitamin B complex capsule 1 Cap    pantoprazole (PROTONIX) tablet 40 mg    sevelamer carbonate (RENVELA) tab 800 mg    acetaminophen (TYLENOL) tablet 650 mg    Or    acetaminophen (TYLENOL) suppository 650 mg    polyethylene glycol (MIRALAX) packet 17 g    ondansetron (ZOFRAN ODT) tablet 4 mg    Or    ondansetron (ZOFRAN) injection 4 mg    piperacillin-tazobactam (ZOSYN) 3.375 g in 0.9% sodium chloride (MBP/ADV) 100 mL MBP      Patient PCP: None  PMH:  has a past medical history of Chronic kidney disease and Heart failure ([facility] [address] PSH:   has no past surgical history on file. FHX: family history is not on file. SHX:  reports that she has never smoked. She has never used smokeless tobacco. She reports previous alcohol use. She reports previous drug use.      ROS:  Unable to obtain as patient was lethargic and severely short of breath      Objective:     Vital Signs: Telemetry:    normal sinus rhythm Intake/Output:   Visit Vitals  /87   Pulse 94   Temp 98.6 °F (37 °C) (Oral)   Resp 20   Ht 5' 3\" (1.6 m)   Wt 48.8 kg (107 lb 9.4 oz)   SpO2 98%   BMI 19.06 kg/m²       Temp (24hrs), Av.4 °F (36.9 °C), Min:98 °F (36.7 °C), Max:98.9 °F (37.2 °C)        O2 Device: Room air O2 Flow Rate (L/min): 0 l/min       Wt Readings from Last 4 Encounters:   21 48.8 kg (107 lb 9.4 oz)          Intake/Output Summary (Last 24 hours) at 3/4/2021 1020  Last data filed at 3/4/2021 0744  Gross per 24 hour   Intake    Output 0 ml   Net 0 ml       Last shift:      No intake/output data recorded. Last 3 shifts: No intake/output data recorded. Physical Exam:   Patient is intubated on ventilator  Physical Exam   Constitutional: She appears distressed. HENT:   Head: Normocephalic and atraumatic. Eyes: Pupils are equal, round, and reactive to light. EOM are normal.   Neck: Normal range of motion. Neck supple. Cardiovascular: Normal rate and regular rhythm. Pulmonary/Chest: She is in respiratory distress. She has rales. Abdominal: Soft. Musculoskeletal: Normal range of motion. Neurological: She is alert. Skin: Skin is warm. Labs:    Recent Labs     03/04/21  0403 03/03/21  0400 03/02/21  0530   WBC 5.7 5.7 7.6   HGB 11.1* 11.8 11.5    176 155     Recent Labs     03/04/21  0403 03/03/21  0400 03/02/21  0530 03/01/21  1245   * 133* 132*  --    K 4.5 4.8 5.0  --    CL 94* 95* 95*  --    CO2 23 27 24  --    * 145* 111*  --    BUN 84* 57* 80*  --    CREA 8.01* 6.21* 8.27*  --    CA 8.2* 8.2* 7.9*  --    PHOS  --   --   --  6.9*   ALB 2.9* 2.8* 3.0*  --    ALT 72 75 73  --      Recent Labs     03/02/21  0436   PH 7.43   PCO2 35   PO2 72*   HCO3 24   FIO2 21.0     No results for input(s): CPK, CKNDX, TROIQ in the last 72 hours. No lab exists for component: CPKMB  No results found for: BNPP, BNP   Lab Results   Component Value Date/Time    Culture result: No growth 2 days 02/28/2021 09:30 AM    Culture result: No growth 3 days 02/27/2021 01:58 AM    Culture result: No growth 3 days 02/27/2021 01:45 AM   No results found for: TSH, TSHEXT, TSHEXT    Imaging:    CXR Results  (Last 48 hours)    None        Results from Hospital Encounter encounter on 02/26/21   XR CHEST PORT    Narrative Chest single view. Comparison single view chest March 1, 2021    ET tube and NG tube have been removed. Stable right-sided permacath. Unchanged  appearance for the lungs.  No gross interstitial or alveolar pulmonary edema. Cardiac and mediastinal structures unchanged. No pneumothorax or sizable pleural  effusion. XR CHEST PORT    Narrative Portable chest, 0318 hours, compared with 2/28/2021. Impression Stable appearance of endotracheal tube, right central dialysis  catheter, gastric tube, and temporary right ventricular pacing lead. Bilateral  pulmonary edema and small pleural effusions, consistent with clinical CHF,  subjectively slightly improved. No pneumothorax or other acute change. XR CHEST PORT    Narrative Upright radiograph chest 11:12 AM compared with 3/27/2021 at 7:19 AM.    INDICATION: Tachypnea, pneumonia. Right IJ central line remains in place. Heart size is stable. Extensive  bilateral airspace disease, right greater than left, in a predominantly central  distribution shows minimal improvement compared to the previous study. Defibrillator pads project over the right upper chest and left lower chest. No  pneumothorax. Definite displaced fractures. Next      Impression Persistent bilateral airspace disease showing slight improvement  compared to earlier study. Results from [place] encounter on 02/26/21   CTA CHEST W OR W WO CONT    Narrative CT dose reduction was achieved through use of a standardized protocol tailored  for this examination and automatic exposure control for dose modulation. Contrast study maximum intensity projections    There is a extensive diffuse lung disease. Dense consolidation is seen  throughout much of the right lower lobe and there is mild variable groundglass  opacification and small foci of dense consolidation scattered elsewhere  throughout much of each lung, right greater than left, with subpleural sparing,  mostly on the LEFT. Right middle lobe is disproportionately less involved, as is  the anterior left upper lobe.  Central airways are open    Pulmonary arteries are densely opacified and show no filling defect or  distortion. Aorta shows normal dimensions, without dissection. No mediastinal or  hilar adenopathy. Small moderate symmetric pleural effusions. No pericardial  effusion. No significant abdominal finding. Body wall edema      Impression The findings may be a combination of extensive pneumonia,  pneumonitis and edema. No evidence of PE         IMPRESSION:   1. Acute hypoxic respiratory failure  2. History of Goodpasture syndrome  3. Severe cardiomyopathy ejection fraction about 17%  4. Fluid overload pulmonary edema  5. End-stage renal disease on hemodialysis  6. Neutropenia and hyperkalemia  7. Anemia  8. Bradycardia resolved  Body mass index is 19.06 kg/m². 9. Pt is requiring Drug therapy requiring intensive monitoring for toxicity  10. Pt is unstable, unpredictable needing inpatient monitoring; is acutely ill and at high risk of sudden decline and decompensation with severe consequenses and continued end organ dysfunction and failure  11. Prognosis guarded       RECOMMENDATIONS/PLAN:     1. Extubated on 3/1 patient is on room air now  2. CAT scan of the chest shows pulmonary edema CHF with prior history of Goodpasture syndrome. 3. Agree with dialysis   4. Patient on prednisone  5. Patient is afebrile normal white count  6. 2D echo shows ejection fraction of 17% off aminophylline and dobutamine  7. Supplemental O2 to keep sats > 93%  8. Aspiration precautions  9. Labs to follow electrolytes, renal function and and blood counts  10. Glucose monitoring and SSI  11. Bronchial hygiene with respiratory therapy techniques, bronchodilators  12.  DVT, SUP prophylaxis       ·           Suleman Jade MD

## 2021-03-05 ENCOUNTER — ANESTHESIA (OUTPATIENT)
Dept: MEDSURG UNIT | Age: 71
DRG: 208 | End: 2021-03-05
Payer: MEDICARE

## 2021-03-05 ENCOUNTER — ANESTHESIA EVENT (OUTPATIENT)
Dept: MEDSURG UNIT | Age: 71
DRG: 208 | End: 2021-03-05
Payer: MEDICARE

## 2021-03-05 LAB
ALBUMIN SERPL-MCNC: 3.3 G/DL (ref 3.5–5)
ALBUMIN/GLOB SERPL: 1.1 {RATIO} (ref 1.1–2.2)
ALP SERPL-CCNC: 92 U/L (ref 45–117)
ALT SERPL-CCNC: 58 U/L (ref 12–78)
ANION GAP SERPL CALC-SCNC: 13 MMOL/L (ref 5–15)
AST SERPL W P-5'-P-CCNC: 25 U/L (ref 15–37)
BACTERIA SPEC CULT: NORMAL
BASOPHILS # BLD: 0 K/UL (ref 0–0.1)
BASOPHILS NFR BLD: 0 % (ref 0–1)
BILIRUB SERPL-MCNC: 0.4 MG/DL (ref 0.2–1)
BUN SERPL-MCNC: 60 MG/DL (ref 6–20)
BUN/CREAT SERPL: 9 (ref 12–20)
CA-I BLD-MCNC: 8.6 MG/DL (ref 8.5–10.1)
CHLORIDE SERPL-SCNC: 92 MMOL/L (ref 97–108)
CO2 SERPL-SCNC: 24 MMOL/L (ref 21–32)
CREAT SERPL-MCNC: 6.76 MG/DL (ref 0.55–1.02)
DIFFERENTIAL METHOD BLD: ABNORMAL
EOSINOPHIL # BLD: 0 K/UL (ref 0–0.4)
EOSINOPHIL NFR BLD: 0 % (ref 0–7)
ERYTHROCYTE [DISTWIDTH] IN BLOOD BY AUTOMATED COUNT: 16.1 % (ref 11.5–14.5)
GLOBULIN SER CALC-MCNC: 3.1 G/DL (ref 2–4)
GLUCOSE BLD STRIP.AUTO-MCNC: 104 MG/DL (ref 65–100)
GLUCOSE BLD STRIP.AUTO-MCNC: 166 MG/DL (ref 65–100)
GLUCOSE BLD STRIP.AUTO-MCNC: 205 MG/DL (ref 65–100)
GLUCOSE BLD STRIP.AUTO-MCNC: 239 MG/DL (ref 65–100)
GLUCOSE SERPL-MCNC: 147 MG/DL (ref 65–100)
HBV CORE AB SERPL QL IA: NEGATIVE
HCT VFR BLD AUTO: 36.6 % (ref 35–47)
HGB BLD-MCNC: 11.7 G/DL (ref 11.5–16)
IMM GRANULOCYTES # BLD AUTO: 0.1 K/UL (ref 0–0.04)
IMM GRANULOCYTES NFR BLD AUTO: 1 % (ref 0–0.5)
LYMPHOCYTES # BLD: 0.2 K/UL (ref 0.8–3.5)
LYMPHOCYTES NFR BLD: 3 % (ref 12–49)
MCH RBC QN AUTO: 28.3 PG (ref 26–34)
MCHC RBC AUTO-ENTMCNC: 32 G/DL (ref 30–36.5)
MCV RBC AUTO: 88.4 FL (ref 80–99)
MONOCYTES # BLD: 0.3 K/UL (ref 0–1)
MONOCYTES NFR BLD: 5 % (ref 5–13)
NEUTS SEG # BLD: 6 K/UL (ref 1.8–8)
NEUTS SEG NFR BLD: 91 % (ref 32–75)
PERFORMED BY, TECHID: ABNORMAL
PLATELET # BLD AUTO: 213 K/UL (ref 150–400)
PMV BLD AUTO: 11.2 FL (ref 8.9–12.9)
POTASSIUM SERPL-SCNC: 4.3 MMOL/L (ref 3.5–5.1)
PROT SERPL-MCNC: 6.4 G/DL (ref 6.4–8.2)
RBC # BLD AUTO: 4.14 M/UL (ref 3.8–5.2)
SODIUM SERPL-SCNC: 129 MMOL/L (ref 136–145)
SPECIAL REQUESTS,SREQ: NORMAL
WBC # BLD AUTO: 6.6 K/UL (ref 3.6–11)

## 2021-03-05 PROCEDURE — 92526 ORAL FUNCTION THERAPY: CPT

## 2021-03-05 PROCEDURE — 74011250637 HC RX REV CODE- 250/637: Performed by: FAMILY MEDICINE

## 2021-03-05 PROCEDURE — 74011250636 HC RX REV CODE- 250/636: Performed by: LEGAL MEDICINE

## 2021-03-05 PROCEDURE — 74011250637 HC RX REV CODE- 250/637: Performed by: INTERNAL MEDICINE

## 2021-03-05 PROCEDURE — 80053 COMPREHEN METABOLIC PANEL: CPT

## 2021-03-05 PROCEDURE — 74011636637 HC RX REV CODE- 636/637: Performed by: FAMILY MEDICINE

## 2021-03-05 PROCEDURE — 74011636637 HC RX REV CODE- 636/637: Performed by: INTERNAL MEDICINE

## 2021-03-05 PROCEDURE — 36415 COLL VENOUS BLD VENIPUNCTURE: CPT

## 2021-03-05 PROCEDURE — 85025 COMPLETE CBC W/AUTO DIFF WBC: CPT

## 2021-03-05 PROCEDURE — 74011250636 HC RX REV CODE- 250/636: Performed by: INTERNAL MEDICINE

## 2021-03-05 PROCEDURE — 82962 GLUCOSE BLOOD TEST: CPT

## 2021-03-05 PROCEDURE — 65270000029 HC RM PRIVATE

## 2021-03-05 RX ADMIN — ISOSORBIDE DINITRATE 20 MG: 20 TABLET ORAL at 20:55

## 2021-03-05 RX ADMIN — CARVEDILOL 6.25 MG: 3.12 TABLET, FILM COATED ORAL at 08:16

## 2021-03-05 RX ADMIN — SEVELAMER CARBONATE 800 MG: 800 TABLET, FILM COATED ORAL at 16:27

## 2021-03-05 RX ADMIN — Medication 1 CAPSULE: at 08:16

## 2021-03-05 RX ADMIN — SEVELAMER CARBONATE 800 MG: 800 TABLET, FILM COATED ORAL at 12:08

## 2021-03-05 RX ADMIN — ISOSORBIDE DINITRATE 20 MG: 20 TABLET ORAL at 08:16

## 2021-03-05 RX ADMIN — AMLODIPINE BESYLATE 10 MG: 5 TABLET ORAL at 08:16

## 2021-03-05 RX ADMIN — MIRTAZAPINE 7.5 MG: 15 TABLET, FILM COATED ORAL at 20:56

## 2021-03-05 RX ADMIN — HYDRALAZINE HYDROCHLORIDE 25 MG: 25 TABLET ORAL at 20:55

## 2021-03-05 RX ADMIN — INSULIN LISPRO 2 UNITS: 100 INJECTION, SOLUTION INTRAVENOUS; SUBCUTANEOUS at 20:56

## 2021-03-05 RX ADMIN — PANTOPRAZOLE SODIUM 40 MG: 40 TABLET, DELAYED RELEASE ORAL at 08:16

## 2021-03-05 RX ADMIN — PREDNISONE 20 MG: 20 TABLET ORAL at 08:16

## 2021-03-05 RX ADMIN — PREDNISONE 20 MG: 20 TABLET ORAL at 16:27

## 2021-03-05 RX ADMIN — ISOSORBIDE DINITRATE 20 MG: 20 TABLET ORAL at 16:27

## 2021-03-05 RX ADMIN — INSULIN LISPRO 4 UNITS: 100 INJECTION, SOLUTION INTRAVENOUS; SUBCUTANEOUS at 12:06

## 2021-03-05 RX ADMIN — INSULIN LISPRO 3 UNITS: 100 INJECTION, SOLUTION INTRAVENOUS; SUBCUTANEOUS at 16:27

## 2021-03-05 RX ADMIN — CARVEDILOL 6.25 MG: 3.12 TABLET, FILM COATED ORAL at 16:27

## 2021-03-05 RX ADMIN — SEVELAMER CARBONATE 800 MG: 800 TABLET, FILM COATED ORAL at 08:16

## 2021-03-05 RX ADMIN — HYDRALAZINE HYDROCHLORIDE 25 MG: 25 TABLET ORAL at 08:16

## 2021-03-05 RX ADMIN — HYDRALAZINE HYDROCHLORIDE 25 MG: 25 TABLET ORAL at 16:27

## 2021-03-05 RX ADMIN — AZATHIOPRINE 25 MG: 50 TABLET ORAL at 08:16

## 2021-03-05 RX ADMIN — DOBUTAMINE IN DEXTROSE 5 MCG/KG/MIN: 200 INJECTION, SOLUTION INTRAVENOUS at 02:15

## 2021-03-05 NOTE — PROGRESS NOTES
SPEECH LANGUAGE PATHOLOGY DYSPHAGIA TREATMENT  Patient: Camila Reed (72 y.o. female)  Date: 3/5/2021  Diagnosis: PNA (pneumonia) [J18.9]  SOB (shortness of breath) [R06.02] <principal problem not specified>  Procedure(s) (LRB):  INSERT TEMPORARY PACEMAKER (N/A) 6 Days Post-Op  Precautions: Fall       ASSESSMENT:  At this time,patient does not present w/ a significant dysphagia. Administered thin liquids via cup and hard solid cracker. Timely oral transit and mastication noted. HLE and protraction adequate to palpation. Pharyngeal phase adequate to ausculation. No overt clinical indicators of aspiration/penetration noted on all consistencies. Over-all oral and pharyngeal swallow WFL. Patient oriented to self and situation only. Patient had difficulty recalling time and current president. PLAN:  Recommendations and Planned Interventions:  Continue patient on regular, renal diet. Aspiration/penetration precautions. No further ST warranted at this time. Re consult as needed. Patient may benefit from neuro consult OP basis if decreased orientation and cognition continues. SUBJECTIVE:   Patient seen at bedside. Alert and reporting no difficulty on current diet. OBJECTIVE:     CXR Results  (Last 48 hours)      None          CT Results  (Last 48 hours)      None           Cognitive and Communication Status:  Neurologic State: Alert  Orientation Level: Oriented X4  Cognition: Memory loss, Appropriate decision making, Appropriate for age attention/concentration, Appropriate safety awareness        Safety/Judgement: Fall prevention, Home safety      Pain:  Pain Scale 1: Numeric (0 - 10)  Pain Intensity 1: 0       After treatment:   Patient left in no apparent distress in bed    COMMUNICATION/EDUCATION:   Patient was educated regarding her deficit(s) of dysphagia, swallow safety precautions, diet recs and POC. She demonstrated Good understanding as evidenced by verbal understanding.       Ruchi Daughters Kareem Dewey, SLP M.S. [facility]SLP  Time Calculation: 10 mins    Student clinician, Sam Tim, treated and documented. Clinician supervised during treatment session and reviewed above documentation. Problem: Dysphagia (Adult)  Goal: *Acute Goals and Plan of Care (Insert Text)  Description: Speech Therapy Goals  Initiated 3/2/2021  -Patient will tolerate regular diet with thin liquids without signs/symptoms of aspiration given minimal cues within 7 day(s). [ ] Not met  [ Lormelanie Dears  MET   [ ] Progressing  [ ] Bonifacio Eastman  -Patient will demonstrate understanding of swallow safety precautions and aspiration precautions, diet recs with no cues within 7 day(s).         [ ] Not met  [ Lorelee Dears  MET   [ ] Progressing  [ ] Discontinue      Outcome: Resolved/Met

## 2021-03-05 NOTE — PROGRESS NOTES
General Daily Progress Note          Patient Name:   Lizzie Mcbride       YOB: 1950       Age:  72 y.o. Admit Date: 2/26/2021      Subjective:     Patient seen in her room, sitting up in bed. She states that she is feeling well. Doing well after extubation on 3/1, talking, eating, alert, and awake. She was dialyzed yesterday. She denies chest pain, shortness of breath, and weakness. /77  P 67    Labs pending today    Hep B and C panels negative     Objective:     Visit Vitals  BP (!) 150/77 (BP 1 Location: Left upper arm)   Pulse 67   Temp 98.4 °F (36.9 °C)   Resp 16   Ht 5' 2.99\" (1.6 m)   Wt 48.8 kg (107 lb 9.4 oz)   SpO2 95%   BMI 19.06 kg/m²        Recent Results (from the past 24 hour(s))   GLUCOSE, POC    Collection Time: 03/04/21 11:15 AM   Result Value Ref Range    Glucose (POC) 92 65 - 100 mg/dL    Performed by Benji Snyder, POC    Collection Time: 03/04/21  4:22 PM   Result Value Ref Range    Glucose (POC) 172 (H) 65 - 100 mg/dL    Performed by Jake Luna    GLUCOSE, POC    Collection Time: 03/04/21  9:10 PM   Result Value Ref Range    Glucose (POC) 129 (H) 65 - 100 mg/dL    Performed by [address] [place] [address], POC    Collection Time: 03/05/21  7:37 AM   Result Value Ref Range    Glucose (POC) 104 (H) 65 - 100 mg/dL    Performed by Lady June      [unfilled]      Review of Systems  Patient is alert awake denies any chest pain or shortness of breath nausea no vomiting      Physical Exam:      Constitutional: V alert awake answer all simple questions   HENT:   Head: Normocephalic and atraumatic. Eyes: Pupils are equal, round, and reactive to light. EOM are normal.   Cardiovascular: Normal rate, regular rhythm and normal heart sounds. Pulmonary/Chest: Clear to auscultation   abdominal: Soft. Bowel sounds are normal. There is no abdominal tenderness. There is no rebound and no guarding. Musculoskeletal: Normal range of motion.    Neurological: Lethargic and sleepy    XR CHEST PORT   Final Result      XR CHEST PORT   Final Result   Stable appearance of endotracheal tube, right central dialysis   catheter, gastric tube, and temporary right ventricular pacing lead. Bilateral   pulmonary edema and small pleural effusions, consistent with clinical CHF,   subjectively slightly improved. No pneumothorax or other acute change. XR CHEST PORT   Final Result   Bilateral airspace disease/edema showing slight improvement compared   to the previous study. XR CHEST PORT   Final Result   Persistent bilateral airspace disease showing slight improvement   compared to earlier study. XR CHEST PORT   Final Result   Bilateral central airspace disease/edema right greater than left   showing slight worsening on the right. CTA CHEST W OR W WO CONT   Final Result   The findings may be a combination of extensive pneumonia,   pneumonitis and edema.  No evidence of PE      XR CHEST SNGL V   Final Result           Recent Results (from the past 24 hour(s))   GLUCOSE, POC    Collection Time: 03/04/21 11:15 AM   Result Value Ref Range    Glucose (POC) 92 65 - 100 mg/dL    Performed by Dhiraj White, POC    Collection Time: 03/04/21  4:22 PM   Result Value Ref Range    Glucose (POC) 172 (H) 65 - 100 mg/dL    Performed by Paloma Dwyer    GLUCOSE, POC    Collection Time: 03/04/21  9:10 PM   Result Value Ref Range    Glucose (POC) 129 (H) 65 - 100 mg/dL    Performed by [address] [place] [address], POC    Collection Time: 03/05/21  7:37 AM   Result Value Ref Range    Glucose (POC) 104 (H) 65 - 100 mg/dL    Performed by Elmer Myers        Results     Procedure Component Value Units Date/Time    CULTURE, RESPIRATORY/SPUTUM/BRONCH Gal Kathleen [[id number]] Collected: 02/28/21 0930    Order Status: Completed Specimen: Sputum Updated: 03/02/21 1233     Special Requests: No Special Requests        GRAM STAIN Occasional WBCs seen               Rare Epithelial cells seen No organisms seen        Culture result: No growth 2 days       CULTURE, BLOOD #1 [[id number]] Collected: 02/27/21 0158    Order Status: Completed Specimen: Blood Updated: 03/05/21 0719     Special Requests: No Special Requests        Culture result: No growth 5 days       CULTURE, BLOOD #2 [[id number]] Collected: 02/27/21 0145    Order Status: Completed Specimen: Blood Updated: 03/05/21 0719     Special Requests: No Special Requests        Culture result: No growth 5 days       CULTURE, BLOOD, PAIRED [[id number]] Collected: 02/26/21 1430    Order Status: Completed Specimen: Blood Updated: 03/05/21 0719     Special Requests: No Special Requests        Culture result: No growth 6 days       CULTURE, BLOOD [[id number]] Collected: 02/26/21 1430    Order Status: Canceled Specimen: Blood     COVID-19 RAPID TEST [[id number]] Collected: 02/26/21 1336    Order Status: Completed Specimen: Nasopharyngeal Updated: 02/26/21 1409     Specimen source Nasopharyngeal        COVID-19 rapid test Not Detected        Comment: Rapid Abbott ID Now   Rapid NAAT:  The specimen is NEGATIVE for SARS-CoV-2, the novel coronavirus associated with COVID-19. Negative results should be treated as presumptive and, if inconsistent with clinical signs and symptoms or necessary for patient management, should be tested with an alternative molecular assay. Negative results do not preclude SARS-CoV-2 infection and should not be used as the sole basis for patient management decisions. This test has been authorized by the FDA under   an Emergency Use Authorization (EUA) for use by authorized laboratories.  Fact sheet for Healthcare Providers: ConventionUpdate.co.nz Fact sheet for Patients: ConventionUpdate.co.nz   Methodology: Isothermal Nucleic Acid Amplification                Labs:     Recent Labs     03/04/21  0403 03/03/21  0400   WBC 5.7 5.7   HGB 11.1* 11.8   HCT 34.5* 36.5    176     Recent Labs 03/04/21  0403 03/03/21  0400   * 133*   K 4.5 4.8   CL 94* 95*   CO2 23 27   BUN 84* 57*   CREA 8.01* 6.21*   * 145*   CA 8.2* 8.2*     Recent Labs     03/04/21  0403 03/03/21  0400   ALT 72 75   AP 70 55   TBILI 0.4 0.4   TP 5.8* 5.6*   ALB 2.9* 2.8*   GLOB 2.9 2.8     No results for input(s): INR, PTP, APTT, INREXT, INREXT in the last 72 hours. No results for input(s): FE, TIBC, PSAT, FERR in the last 72 hours. No results found for: FOL, RBCF   No results for input(s): PH, PCO2, PO2 in the last 72 hours. No results for input(s): CPK, CKNDX, TROIQ in the last 72 hours. No lab exists for component: CPKMB  No results found for: CHOL, CHOLX, CHLST, CHOLV, HDL, HDLP, LDL, LDLC, DLDLP, TGLX, TRIGL, TRIGP, CHHD, CHHDX  Lab Results   Component Value Date/Time    Glucose (POC) 104 (H) 03/05/2021 07:37 AM    Glucose (POC) 129 (H) 03/04/2021 09:10 PM    Glucose (POC) 172 (H) 03/04/2021 04:22 PM    Glucose (POC) 92 03/04/2021 11:15 AM    Glucose (POC) 150 (H) 03/03/2021 11:39 PM     No results found for: COLOR, APPRN, SPGRU, REFSG, YESY, PROTU, GLUCU, KETU, BILU, UROU, CARTER, LEUKU, GLUKE, EPSU, BACTU, WBCU, RBCU, CASTS, UCRY      Assessment:     Acute hypoxemic respiratory failure extubated on 3/1  severe symptomatic bradycardia on Toprol urinary sphincter  End-stage renal disease on hemodialysis at home  Hypertensive  Goodpasture's syndrome  Fluid overload  Hyperkalemia  Anemia of chronic disease      Plan:      On prednisone 20 mg   Protonix 40 mg daily  Discontinued Vanco and Zosyn as patient has no fever WBC counts are normal    Follow-up with nephrology cardiology PT/OT and pulmonologist    Cardiology arranging LifeVest placement prior to discharge     CM sent referrals for [facility]      Current Facility-Administered Medications:     mirtazapine (REMERON) tablet 7.5 mg, 7.5 mg, Oral, QHS, Jeff Snyder MD, 7.5 mg at 03/04/21 2128    predniSONE (DELTASONE) tablet 20 mg, 20 mg, Oral, BID WITH Mariya Almonte MD, 20 mg at 03/05/21 0816    hydrALAZINE (APRESOLINE) tablet 25 mg, 25 mg, Oral, TID, Deni SIMS MD, 25 mg at 03/05/21 0816    isosorbide dinitrate (ISORDIL) tablet 20 mg, 20 mg, Oral, TID, Deni SIMS MD, 20 mg at 03/05/21 0816    heparin (porcine) 1,000 unit/mL injection 3,600 Units, 3,600 Units, Hemodialysis, Q TU, TH & SAT, Maddi Gonzalez MD, 3,600 Units at 03/04/21 0827    hydrALAZINE (APRESOLINE) 20 mg/mL injection 10 mg, 10 mg, IntraVENous, Q6H PRN, Deni SIMS MD    amLODIPine (NORVASC) tablet 10 mg, 10 mg, Oral, DAILY, Deni SIMS MD, 10 mg at 03/05/21 0816    carvediloL (COREG) tablet 6.25 mg, 6.25 mg, Oral, BID WITH MEALS, Deni SIMS MD, 6.25 mg at 03/05/21 0816    hydrOXYzine pamoate (VISTARIL) capsule 50 mg, 50 mg, Oral, Q6H PRN, Kim George MD, 50 mg at 02/27/21 0528    DOBUTamine (DOBUTREX) 500 mg/250 mL (2,000 mcg/mL) infusion, 5 mcg/kg/min, IntraVENous, CONTINUOUS, Francisco Garcia MD, Last Rate: 10.2 mL/hr at 03/05/21 0215, 5 mcg/kg/min at 03/05/21 0215    NOREPINephrine (LEVOPHED) 16,000 mcg in dextrose 5% 250 mL infusion, 2-16 mcg/min, IntraVENous, TITRATE, Juanjose George MD    insulin lispro (HUMALOG) injection, , SubCUTAneous, AC&HS, Kim George MD, Stopped at 03/05/21 0730    glucose chewable tablet 16 g, 4 Tab, Oral, PRN, Kim George MD    dextrose (D50W) injection syrg 12.5-25 g, 25-50 mL, IntraVENous, PRN, Kim George MD    glucagon (GLUCAGEN) injection 1 mg, 1 mg, IntraMUSCular, PRN, Kim George MD    azaTHIOprine (IMURAN) tablet 25 mg, 25 mg, Oral, DAILY, Griselda Loft, MD, 25 mg at 03/05/21 0816    propofol (DIPRIVAN) 10 mg/mL infusion, 0-50 mcg/kg/min, IntraVENous, TITRATE, Juanjose George MD, Stopped at 03/01/21 0815    metoprolol (LOPRESSOR) injection 5 mg, 5 mg, IntraVENous, Q6H PRN, Deni SIMS MD, 5 mg at 02/27/21 1646    vitamin B complex capsule 1 Cap, 1 Cap, Oral, DAILY, Kim George MD, 1 Cap at 03/05/21 0816    pantoprazole (PROTONIX) tablet 40 mg, 40 mg, Oral, DAILY, Kim George MD, 40 mg at 03/05/21 0816    sevelamer carbonate (RENVELA) tab 800 mg, 800 mg, Oral, TID WITH MEALS, Tiffany George MD, 800 mg at 03/05/21 0816    acetaminophen (TYLENOL) tablet 650 mg, 650 mg, Oral, Q6H PRN **OR** acetaminophen (TYLENOL) suppository 650 mg, 650 mg, Rectal, Q6H PRN, Tiffany George MD    polyethylene glycol (MIRALAX) packet 17 g, 17 g, Oral, DAILY PRN, Tiffany George MD    ondansetron (ZOFRAN ODT) tablet 4 mg, 4 mg, Oral, Q8H PRN **OR** ondansetron (ZOFRAN) injection 4 mg, 4 mg, IntraVENous, Q6H PRN, Kim George MD    [Held by provider] piperacillin-tazobactam (ZOSYN) 3.375 g in 0.9% sodium chloride (MBP/ADV) 100 mL MBP, 3.375 g, IntraVENous, Q12H, Kim George MD, Stopped at 03/04/21 0900

## 2021-03-05 NOTE — CONSULTS
PULMONARY NOTE  [facility]    Name: Javier Acosta MRN: [id number]   : 1950 Hospital: [facility]   Date: 3/5/2021  Admission date: 2021 Hospital Day: 8       HPI:     Hospital Problems  Never Reviewed          Codes Class Noted POA    PNA (pneumonia) ICD-10-CM: J18.9  ICD-9-CM: 486  2021 Unknown        SOB (shortness of breath) ICD-10-CM: R06.02  ICD-9-CM: 786.05  2021 Unknown                   [x] High complexity decision making was performed  [x] See my orders for details      Subjective/Initial History:     I was asked by Sumaya Salazar MD to see Javier Acosta  a 72 y.o.  female in consultation     Excerpts from admission 2021 or consult notes as follows:   73-year-old lady came in because of shortness of breath and dyspnea significant past medical history of end-stage renal disease on hemodialysis, Goodpasture syndrome anemia of chronic disease hypertension steroid-dependent, she was not able to dialysis at home because of generalized weakness and shortness of breath she has been on home dialysis no fever no chills she was put on 100% nonrebreather mask which has been switched to noninvasive ventilator not she is going for dialysis because of severe hypoxia so pulmonary critical care consult was called for further evaluation. She is a lifetime non-smoker.       No Known Allergies     MAR reviewed and pertinent medications noted or modified as needed     Current Facility-Administered Medications   Medication    mirtazapine (REMERON) tablet 7.5 mg    predniSONE (DELTASONE) tablet 20 mg    hydrALAZINE (APRESOLINE) tablet 25 mg    isosorbide dinitrate (ISORDIL) tablet 20 mg    heparin (porcine) 1,000 unit/mL injection 3,600 Units    hydrALAZINE (APRESOLINE) 20 mg/mL injection 10 mg    amLODIPine (NORVASC) tablet 10 mg    carvediloL (COREG) tablet 6.25 mg    hydrOXYzine pamoate (VISTARIL) capsule 50 mg    DOBUTamine (DOBUTREX) 500 mg/250 mL (2,000 mcg/mL) infusion    NOREPINephrine (LEVOPHED) 16,000 mcg in dextrose 5% 250 mL infusion    insulin lispro (HUMALOG) injection    glucose chewable tablet 16 g    dextrose (D50W) injection syrg 12.5-25 g    glucagon (GLUCAGEN) injection 1 mg    azaTHIOprine (IMURAN) tablet 25 mg    propofol (DIPRIVAN) 10 mg/mL infusion    metoprolol (LOPRESSOR) injection 5 mg    vitamin B complex capsule 1 Cap    pantoprazole (PROTONIX) tablet 40 mg    sevelamer carbonate (RENVELA) tab 800 mg    acetaminophen (TYLENOL) tablet 650 mg    Or    acetaminophen (TYLENOL) suppository 650 mg    polyethylene glycol (MIRALAX) packet 17 g    ondansetron (ZOFRAN ODT) tablet 4 mg    Or    ondansetron (ZOFRAN) injection 4 mg    [Held by provider] piperacillin-tazobactam (ZOSYN) 3.375 g in 0.9% sodium chloride (MBP/ADV) 100 mL MBP      Patient PCP: None  PMH:  has a past medical history of Chronic kidney disease and Heart failure ([facility], [place] [address] PSH:   has no past surgical history on file. FHX: family history is not on file. SHX:  reports that she has never smoked. She has never used smokeless tobacco. She reports previous alcohol use. She reports previous drug use.      ROS:  Unable to obtain as patient was lethargic and severely short of breath      Objective:     Vital Signs: Telemetry:    normal sinus rhythm Intake/Output:   Visit Vitals  BP (!) 150/77 (BP 1 Location: Left upper arm)   Pulse 67   Temp 98.4 °F (36.9 °C)   Resp 16   Ht 5' 2.99\" (1.6 m)   Wt 48.8 kg (107 lb 9.4 oz)   SpO2 95%   BMI 19.06 kg/m²       Temp (24hrs), Av.4 °F (36.9 °C), Min:97.8 °F (36.6 °C), Max:98.6 °F (37 °C)        O2 Device: Room air O2 Flow Rate (L/min): 0 l/min       Wt Readings from Last 4 Encounters:   21 48.8 kg (107 lb 9.4 oz)          Intake/Output Summary (Last 24 hours) at 3/5/2021 1031  Last data filed at 3/4/2021 1331  Gross per 24 hour   Intake 120 ml   Output 2500 ml   Net -2380 ml       Last shift:      No intake/output data recorded. Last 3 shifts: 03/03 1901 - 03/05 0700  In: 120 [P.O.:120]  Out: 2500        Physical Exam:   Patient is intubated on ventilator  Physical Exam   Constitutional: She appears distressed. HENT:   Head: Normocephalic and atraumatic. Eyes: Pupils are equal, round, and reactive to light. EOM are normal.   Neck: Normal range of motion. Neck supple. Cardiovascular: Normal rate and regular rhythm. Pulmonary/Chest: She is in respiratory distress. She has rales. Abdominal: Soft. Musculoskeletal: Normal range of motion. Neurological: She is alert. Skin: Skin is warm. Labs:    Recent Labs     03/04/21  0403 03/03/21  0400   WBC 5.7 5.7   HGB 11.1* 11.8    176     Recent Labs     03/04/21  0403 03/03/21  0400   * 133*   K 4.5 4.8   CL 94* 95*   CO2 23 27   * 145*   BUN 84* 57*   CREA 8.01* 6.21*   CA 8.2* 8.2*   ALB 2.9* 2.8*   ALT 72 75     No results for input(s): PH, PCO2, PO2, HCO3, FIO2 in the last 72 hours. No results for input(s): CPK, CKNDX, TROIQ in the last 72 hours. No lab exists for component: CPKMB  No results found for: BNPP, BNP   Lab Results   Component Value Date/Time    Culture result: No growth 2 days 02/28/2021 09:30 AM    Culture result: No growth 5 days 02/27/2021 01:58 AM    Culture result: No growth 5 days 02/27/2021 01:45 AM   No results found for: TSH, TSHEXT, TSHEXT    Imaging:    CXR Results  (Last 48 hours)    None        Results from Hospital Encounter encounter on 02/26/21   XR CHEST PORT    Narrative Chest single view. Comparison single view chest March 1, 2021    ET tube and NG tube have been removed. Stable right-sided permacath. Unchanged  appearance for the lungs. No gross interstitial or alveolar pulmonary edema. Cardiac and mediastinal structures unchanged. No pneumothorax or sizable pleural  effusion. XR CHEST PORT    Narrative Portable chest, 0318 hours, compared with 2/28/2021.       Impression Stable appearance of endotracheal tube, right central dialysis  catheter, gastric tube, and temporary right ventricular pacing lead. Bilateral  pulmonary edema and small pleural effusions, consistent with clinical CHF,  subjectively slightly improved. No pneumothorax or other acute change. XR CHEST PORT    Narrative Upright radiograph chest 11:12 AM compared with 3/27/2021 at 7:19 AM.    INDICATION: Tachypnea, pneumonia. Right IJ central line remains in place. Heart size is stable. Extensive  bilateral airspace disease, right greater than left, in a predominantly central  distribution shows minimal improvement compared to the previous study. Defibrillator pads project over the right upper chest and left lower chest. No  pneumothorax. Definite displaced fractures. Next      Impression Persistent bilateral airspace disease showing slight improvement  compared to earlier study. Results from [place] encounter on 02/26/21   CTA CHEST W OR W WO CONT    Narrative CT dose reduction was achieved through use of a standardized protocol tailored  for this examination and automatic exposure control for dose modulation. Contrast study maximum intensity projections    There is a extensive diffuse lung disease. Dense consolidation is seen  throughout much of the right lower lobe and there is mild variable groundglass  opacification and small foci of dense consolidation scattered elsewhere  throughout much of each lung, right greater than left, with subpleural sparing,  mostly on the LEFT. Right middle lobe is disproportionately less involved, as is  the anterior left upper lobe. Central airways are open    Pulmonary arteries are densely opacified and show no filling defect or  distortion. Aorta shows normal dimensions, without dissection. No mediastinal or  hilar adenopathy. Small moderate symmetric pleural effusions. No pericardial  effusion. No significant abdominal finding.  Body wall edema      Impression The findings may be a combination of extensive pneumonia,  pneumonitis and edema. No evidence of PE         IMPRESSION:   1. Acute hypoxic respiratory failure  2. History of Goodpasture syndrome  3. Severe cardiomyopathy ejection fraction about 17%  4. Fluid overload pulmonary edema  5. End-stage renal disease on hemodialysis  6. Neutropenia and hyperkalemia  7. Anemia  8. Bradycardia resolved  Body mass index is 19.06 kg/m². RECOMMENDATIONS/PLAN:     1. Extubated on 3/1 patient is on room air now  2. CAT scan of the chest shows pulmonary edema CHF with prior history of Goodpasture syndrome. 3. Agree with dialysis   4. Patient on prednisone  5.  Patient is afebrile normal white count  6. 2D echo shows ejection fraction of 17% off aminophylline and dobutamine         ·           Ozzy Bentley MD

## 2021-03-05 NOTE — PROGRESS NOTES
CM spoke to patient spouse Parisa Higginbotham [phone]) regarding discharge planning needs for patient. CM explained that patient was accepted into [facility]. Spouse stated that he is interested in getting a PCA for patient. CM will continue to follow patient for discharge planning needs.

## 2021-03-05 NOTE — PROGRESS NOTES
General Daily Progress Note          Patient Name:   Sherri Shetty       YOB: 1950       Age:  72 y.o. Admit Date: 2/26/2021      Subjective:     Patient seen in her room, sitting up in bed. She states that she is feeling well. Doing well after extubation on 3/1, talking, eating, alert, and awake. She was dialyzed yesterday. She denies chest pain, shortness of breath, and weakness. /77  P 67    Labs pending today    Hep B and C panels negative     Objective:     Visit Vitals  BP (!) 150/77 (BP 1 Location: Left upper arm)   Pulse 67   Temp 98.4 °F (36.9 °C)   Resp 16   Ht 5' 2.99\" (1.6 m)   Wt 48.8 kg (107 lb 9.4 oz)   SpO2 95%   BMI 19.06 kg/m²        Recent Results (from the past 24 hour(s))   GLUCOSE, POC    Collection Time: 03/04/21  4:22 PM   Result Value Ref Range    Glucose (POC) 172 (H) 65 - 100 mg/dL    Performed by [address] [place] [address], POC    Collection Time: 03/04/21  9:10 PM   Result Value Ref Range    Glucose (POC) 129 (H) 65 - 100 mg/dL    Performed by [address] [place] [address], POC    Collection Time: 03/05/21  7:37 AM   Result Value Ref Range    Glucose (POC) 104 (H) 65 - 100 mg/dL    Performed by Bree Samaniego, POC    Collection Time: 03/05/21 10:51 AM   Result Value Ref Range    Glucose (POC) 205 (H) 65 - 100 mg/dL    Performed by Corby Rosales      [unfilled]      Review of Systems  Patient is alert awake denies any chest pain or shortness of breath nausea no vomiting      Physical Exam:      Constitutional: V alert awake answer all simple questions   HENT:   Head: Normocephalic and atraumatic. Eyes: Pupils are equal, round, and reactive to light. EOM are normal.   Cardiovascular: Normal rate, regular rhythm and normal heart sounds. Pulmonary/Chest: Clear to auscultation   abdominal: Soft. Bowel sounds are normal. There is no abdominal tenderness. There is no rebound and no guarding. Musculoskeletal: Normal range of motion.    Neurological: Lethargic and sleepy    XR CHEST PORT   Final Result      XR CHEST PORT   Final Result   Stable appearance of endotracheal tube, right central dialysis   catheter, gastric tube, and temporary right ventricular pacing lead. Bilateral   pulmonary edema and small pleural effusions, consistent with clinical CHF,   subjectively slightly improved. No pneumothorax or other acute change. XR CHEST PORT   Final Result   Bilateral airspace disease/edema showing slight improvement compared   to the previous study. XR CHEST PORT   Final Result   Persistent bilateral airspace disease showing slight improvement   compared to earlier study. XR CHEST PORT   Final Result   Bilateral central airspace disease/edema right greater than left   showing slight worsening on the right. CTA CHEST W OR W WO CONT   Final Result   The findings may be a combination of extensive pneumonia,   pneumonitis and edema.  No evidence of PE      XR CHEST SNGL V   Final Result           Recent Results (from the past 24 hour(s))   GLUCOSE, POC    Collection Time: 03/04/21  4:22 PM   Result Value Ref Range    Glucose (POC) 172 (H) 65 - 100 mg/dL    Performed by Kimmie Rosario    GLUCOSE, POC    Collection Time: 03/04/21  9:10 PM   Result Value Ref Range    Glucose (POC) 129 (H) 65 - 100 mg/dL    Performed by [address] [place] [address], POC    Collection Time: 03/05/21  7:37 AM   Result Value Ref Range    Glucose (POC) 104 (H) 65 - 100 mg/dL    Performed by Anil [address], POC    Collection Time: 03/05/21 10:51 AM   Result Value Ref Range    Glucose (POC) 205 (H) 65 - 100 mg/dL    Performed by Margot Gross        Results     Procedure Component Value Units Date/Time    CULTURE, RESPIRATORY/SPUTUM/BRONCH Barbarashekhar Shafer [[id number]] Collected: 02/28/21 0930    Order Status: Completed Specimen: Sputum Updated: 03/02/21 1233     Special Requests: No Special Requests        GRAM STAIN Occasional WBCs seen               Rare Epithelial cells seen            No organisms seen        Culture result: No growth 2 days       CULTURE, BLOOD #1 [[id number]] Collected: 02/27/21 0158    Order Status: Completed Specimen: Blood Updated: 03/05/21 0719     Special Requests: No Special Requests        Culture result: No growth 5 days       CULTURE, BLOOD #2 [[id number]] Collected: 02/27/21 0145    Order Status: Completed Specimen: Blood Updated: 03/05/21 0719     Special Requests: No Special Requests        Culture result: No growth 5 days       CULTURE, BLOOD, PAIRED [[id number]] Collected: 02/26/21 1430    Order Status: Completed Specimen: Blood Updated: 03/05/21 0719     Special Requests: No Special Requests        Culture result: No growth 6 days       CULTURE, BLOOD [[id number]] Collected: 02/26/21 1430    Order Status: Canceled Specimen: Blood     COVID-19 RAPID TEST [[id number]] Collected: 02/26/21 1336    Order Status: Completed Specimen: Nasopharyngeal Updated: 02/26/21 1409     Specimen source Nasopharyngeal        COVID-19 rapid test Not Detected        Comment: Rapid Abbott ID Now   Rapid NAAT:  The specimen is NEGATIVE for SARS-CoV-2, the novel coronavirus associated with COVID-19. Negative results should be treated as presumptive and, if inconsistent with clinical signs and symptoms or necessary for patient management, should be tested with an alternative molecular assay. Negative results do not preclude SARS-CoV-2 infection and should not be used as the sole basis for patient management decisions. This test has been authorized by the FDA under   an Emergency Use Authorization (EUA) for use by authorized laboratories.  Fact sheet for Healthcare Providers: ConventionUpdate.co.nz Fact sheet for Patients: ConventionUpdate.co.nz   Methodology: Isothermal Nucleic Acid Amplification                Labs:     Recent Labs     03/04/21  0403 03/03/21  0400   WBC 5.7 5.7   HGB 11.1* 11.8   HCT 34.5* 36.5    176     Recent Labs     03/04/21  0403 03/03/21  0400   * 133*   K 4.5 4.8   CL 94* 95*   CO2 23 27   BUN 84* 57*   CREA 8.01* 6.21*   * 145*   CA 8.2* 8.2*     Recent Labs     03/04/21  0403 03/03/21  0400   ALT 72 75   AP 70 55   TBILI 0.4 0.4   TP 5.8* 5.6*   ALB 2.9* 2.8*   GLOB 2.9 2.8     No results for input(s): INR, PTP, APTT, INREXT, INREXT in the last 72 hours. No results for input(s): FE, TIBC, PSAT, FERR in the last 72 hours. No results found for: FOL, RBCF   No results for input(s): PH, PCO2, PO2 in the last 72 hours. No results for input(s): CPK, CKNDX, TROIQ in the last 72 hours.     No lab exists for component: CPKMB  No results found for: CHOL, CHOLX, CHLST, CHOLV, HDL, HDLP, LDL, LDLC, DLDLP, TGLX, TRIGL, TRIGP, CHHD, CHHDX  Lab Results   Component Value Date/Time    Glucose (POC) 205 (H) 03/05/2021 10:51 AM    Glucose (POC) 104 (H) 03/05/2021 07:37 AM    Glucose (POC) 129 (H) 03/04/2021 09:10 PM    Glucose (POC) 172 (H) 03/04/2021 04:22 PM    Glucose (POC) 92 03/04/2021 11:15 AM     No results found for: COLOR, APPRN, SPGRU, REFSG, YESY, PROTU, GLUCU, KETU, BILU, UROU, CARTER, LEUKU, GLUKE, EPSU, BACTU, WBCU, RBCU, CASTS, UCRY      Assessment:     Acute hypoxemic respiratory failure extubated on 3/1  severe symptomatic bradycardia on Toprol urinary sphincter  End-stage renal disease on hemodialysis at home  End-stage cardiomyopathy with ejection fraction of 17%  Hypertensive  Goodpasture's syndrome  Fluid overload  Hyperkalemia  Anemia of chronic disease      Plan:   On dobutamine drip as per cardiology  Patient on Norvasc 10 mg daily  On Imuran 25 mg daily  Coreg 6.25 twice daily  Hydralazine 25 mg 3 times a day  On isosorbide 20 mg 3 times a day  Remeron 7.5 mg nightly   On prednisone 20 mg   Protonix 40 mg daily  Discontinued Vanco and Zosyn as patient has no fever WBC counts are normal    Follow-up with nephrology cardiology PT/OT and pulmonologist    Cardiology arranging LifeVest placement prior to discharge     CM sent referrals for [facility]      Current Facility-Administered Medications:     mirtazapine (REMERON) tablet 7.5 mg, 7.5 mg, Oral, QHS, Jeff Snyder MD, 7.5 mg at 03/04/21 2128    predniSONE (DELTASONE) tablet 20 mg, 20 mg, Oral, BID WITH MEALS, Nasir Rajput MD, 20 mg at 03/05/21 0816    hydrALAZINE (APRESOLINE) tablet 25 mg, 25 mg, Oral, TID, Alex SIMS MD, 25 mg at 03/05/21 0816    isosorbide dinitrate (ISORDIL) tablet 20 mg, 20 mg, Oral, TID, Alex SIMS MD, 20 mg at 03/05/21 0816    heparin (porcine) 1,000 unit/mL injection 3,600 Units, 3,600 Units, Hemodialysis, Q TU, TH & SAT, Sangita Gonzalez MD, 3,600 Units at 03/04/21 0827    hydrALAZINE (APRESOLINE) 20 mg/mL injection 10 mg, 10 mg, IntraVENous, Q6H PRN, Alex SIMS MD    amLODIPine (NORVASC) tablet 10 mg, 10 mg, Oral, DAILY, Jose Luis Harvey MD, 10 mg at 03/05/21 0816    carvediloL (COREG) tablet 6.25 mg, 6.25 mg, Oral, BID WITH MEALS, Alex SIMS MD, 6.25 mg at 03/05/21 0816    hydrOXYzine pamoate (VISTARIL) capsule 50 mg, 50 mg, Oral, Q6H PRN, Kim George MD, 50 mg at 02/27/21 0528    DOBUTamine (DOBUTREX) 500 mg/250 mL (2,000 mcg/mL) infusion, 5 mcg/kg/min, IntraVENous, CONTINUOUS, Alex SIMS MD, Last Rate: 10.2 mL/hr at 03/05/21 0215, 5 mcg/kg/min at 03/05/21 0215    NOREPINephrine (LEVOPHED) 16,000 mcg in dextrose 5% 250 mL infusion, 2-16 mcg/min, IntraVENous, TITRATE, Divya George MD    insulin lispro (HUMALOG) injection, , SubCUTAneous, AC&HS, Kim George MD, Stopped at 03/05/21 0730    glucose chewable tablet 16 g, 4 Tab, Oral, PRN, Kim George MD    dextrose (D50W) injection syrg 12.5-25 g, 25-50 mL, IntraVENous, PRN, Kim George MD    glucagon (GLUCAGEN) injection 1 mg, 1 mg, IntraMUSCular, PRN, Kim George MD    azaTHIOprine (IMURAN) tablet 25 mg, 25 mg, Oral, DAILY, Nimesh Estrada MD, 25 mg at 03/05/21 9722    propofol (DIPRIVAN) 10 mg/mL infusion, 0-50 mcg/kg/min, IntraVENous, TITRATE, Ramón George MD, Stopped at 03/01/21 0815    metoprolol (LOPRESSOR) injection 5 mg, 5 mg, IntraVENous, Q6H PRN, Marjan SIMS MD, 5 mg at 02/27/21 1646    vitamin B complex capsule 1 Cap, 1 Cap, Oral, DAILY, Kim George MD, 1 Cap at 03/05/21 0816    pantoprazole (PROTONIX) tablet 40 mg, 40 mg, Oral, DAILY, Kim George MD, 40 mg at 03/05/21 0816    sevelamer carbonate (RENVELA) tab 800 mg, 800 mg, Oral, TID WITH MEALS, Ramón George MD, 800 mg at 03/05/21 0816    acetaminophen (TYLENOL) tablet 650 mg, 650 mg, Oral, Q6H PRN **OR** acetaminophen (TYLENOL) suppository 650 mg, 650 mg, Rectal, Q6H PRN, Ramón George MD    polyethylene glycol (MIRALAX) packet 17 g, 17 g, Oral, DAILY PRN, Ramón George MD    ondansetron (ZOFRAN ODT) tablet 4 mg, 4 mg, Oral, Q8H PRN **OR** ondansetron (ZOFRAN) injection 4 mg, 4 mg, IntraVENous, Q6H PRN, Kim George MD    [Held by provider] piperacillin-tazobactam (ZOSYN) 3.375 g in 0.9% sodium chloride (MBP/ADV) 100 mL MBP, 3.375 g, IntraVENous, Q12H, Kim George MD, Stopped at 03/04/21 0900

## 2021-03-05 NOTE — PROGRESS NOTES
CM spoke with patient's , Bubba Parada [phone], at his request to discuss DCP. Mr. Gerardo Aguirre asked about services patient would be returning with. CM explained patient would have [facility]. Mr. Gerardo Aguirre asked about PCA services, CM explained that they are typically privately paid for unless patient would qualify for Medicaid, Mr. Gerardo Aguirre reported that they would not qualify for medicaid. Mr. Gerardo Aguirre also asked CM if patient should return home with dialysis or go to the center where she was previously [facility]). CHERY explained that he would need to speak with the nephrologist in order to make that decision with the physician. Once decision is made CM can set up dialysis chair, if necessary, at this time Mr. Gerardo Aguirre did not want CM to send referral until decision was made. CM continues to follow.

## 2021-03-05 NOTE — PROGRESS NOTES
Progress Note      3/5/2021 5:50 AM  NAME: Katherine Alanis   MRN:  [id number]   Admit Diagnosis: PNA (pneumonia) [J18.9]  SOB (shortness of breath) [R06.02]      Problem List:   1.  Incomplete database secondary to altered mental status  2.  Patient presents for shortness of breath   3.  Acute hypoxic respiratory failure requiring intubation-now extubated  4.  Pneumonia  5.  Fluid overload, pulmonary edema  6.  Goodpasture's syndrome  7.  End-stage renal disease dialysis dependent  8.  Cardiomyopathy (ejection fraction =17%)  9.  Grade I (mild) diastolic dysfunction, or impaired relaxation  10.  Mild pulmonary hypertension (RVSP =42 mmHg)     11.  Valvular heart disease         11a.  Mild to moderate tricuspid regurgitation         11b.  Mild pulmonic regurgitation         11c.  Mild to moderate mitral regurgitation  12.  Profound bradycardia corrected  13.  Possible seizure disorder  14.  Gastroesophageal reflux disease (GERD)  16.  Thrombocytopenia  17.  Electrolyte abnormalities (hyponatremia, hypochloremia, and hypocalcemia)       Subjective: The patient is seen and examined in room 463. There were no acute cardiovascular events reported overnight. Currently, she denies any cardiovascular complaints. Yesterday afternoon I spoke with the patient and her . The  indicates the patient is in the process of being evaluated for kidney transplantation at the [facility]. He also indicates they desire the patient's primary cardiologist to be Osman Amaral MD, with the [address] She has agreed to accept the patient. Given the patient's significant cardiomyopathy (ejection fraction =17%) the patient will require a LifeVest prior to discharge. Discussed with RN events overnight.      Medications Personally Reviewed:    Current Facility-Administered Medications   Medication Dose Route Frequency    mirtazapine (REMERON) tablet 7.5 mg  7.5 mg Oral QHS    predniSONE (DELTASONE) tablet 20 mg  20 mg Oral BID WITH MEALS    hydrALAZINE (APRESOLINE) tablet 25 mg  25 mg Oral TID    isosorbide dinitrate (ISORDIL) tablet 20 mg  20 mg Oral TID    heparin (porcine) 1,000 unit/mL injection 3,600 Units  3,600 Units Hemodialysis Q TU, TH & SAT    hydrALAZINE (APRESOLINE) 20 mg/mL injection 10 mg  10 mg IntraVENous Q6H PRN    amLODIPine (NORVASC) tablet 10 mg  10 mg Oral DAILY    carvediloL (COREG) tablet 6.25 mg  6.25 mg Oral BID WITH MEALS    hydrOXYzine pamoate (VISTARIL) capsule 50 mg  50 mg Oral Q6H PRN    DOBUTamine (DOBUTREX) 500 mg/250 mL (2,000 mcg/mL) infusion  5 mcg/kg/min IntraVENous CONTINUOUS    NOREPINephrine (LEVOPHED) 16,000 mcg in dextrose 5% 250 mL infusion  2-16 mcg/min IntraVENous TITRATE    insulin lispro (HUMALOG) injection   SubCUTAneous AC&HS    glucose chewable tablet 16 g  4 Tab Oral PRN    dextrose (D50W) injection syrg 12.5-25 g  25-50 mL IntraVENous PRN    glucagon (GLUCAGEN) injection 1 mg  1 mg IntraMUSCular PRN    azaTHIOprine (IMURAN) tablet 25 mg  25 mg Oral DAILY    propofol (DIPRIVAN) 10 mg/mL infusion  0-50 mcg/kg/min IntraVENous TITRATE    metoprolol (LOPRESSOR) injection 5 mg  5 mg IntraVENous Q6H PRN    vitamin B complex capsule 1 Cap  1 Cap Oral DAILY    pantoprazole (PROTONIX) tablet 40 mg  40 mg Oral DAILY    sevelamer carbonate (RENVELA) tab 800 mg  800 mg Oral TID WITH MEALS    acetaminophen (TYLENOL) tablet 650 mg  650 mg Oral Q6H PRN    Or    acetaminophen (TYLENOL) suppository 650 mg  650 mg Rectal Q6H PRN    polyethylene glycol (MIRALAX) packet 17 g  17 g Oral DAILY PRN    ondansetron (ZOFRAN ODT) tablet 4 mg  4 mg Oral Q8H PRN    Or    ondansetron (ZOFRAN) injection 4 mg  4 mg IntraVENous Q6H PRN    [Held by provider] piperacillin-tazobactam (ZOSYN) 3.375 g in 0.9% sodium chloride (MBP/ADV) 100 mL MBP  3.375 g IntraVENous Q12H           Objective:      Physical Exam:  Last 24hrs VS reviewed since prior progress note. Most recent are:    Visit Vitals  /75   Pulse 87   Temp 98.6 °F (37 °C)   Resp 20   Ht 5' 2.99\" (1.6 m)   Wt 48.8 kg (107 lb 9.4 oz)   SpO2 98%   BMI 19.06 kg/m²       Intake/Output Summary (Last 24 hours) at 3/5/2021 0550  Last data filed at 3/4/2021 1331  Gross per 24 hour   Intake 120 ml   Output 2500 ml   Net -2380 ml        General Appearance: Well developed, in no acute respiratory distress. Chest: Lungs clear to auscultation bilaterally. Cardiovascular: JVP is not elevated, PMI is not attempted, normal intensity S1 and S2, without S3. Abdomen: Soft, non-tender, bowel sounds are active. Extremities: No edema bilaterally. Data Review    Telemetry: Sinus rhythm without ventricular ectopy    EKG:   [x]  No new EKG for review    Lab Data Personally Reviewed:    Recent Labs     03/04/21 0403 03/03/21 0400   WBC 5.7 5.7   HGB 11.1* 11.8   HCT 34.5* 36.5    176     No results for input(s): INR, PTP, APTT, INREXT in the last 72 hours. Recent Labs     03/04/21 0403 03/03/21 0400   * 133*   K 4.5 4.8   CL 94* 95*   CO2 23 27   BUN 84* 57*   CREA 8.01* 6.21*   * 145*   CA 8.2* 8.2*     No results for input(s): CPK, CKNDX, TROIQ in the last 72 hours. No lab exists for component: CPKMB  No results found for: CHOL, CHOLX, CHLST, CHOLV, HDL, HDLP, LDL, LDLC, DLDLP, TGLX, TRIGL, TRIGP, CHHD, CHHDX    Recent Labs     03/04/21 0403 03/03/21 0400   AP 70 55   TP 5.8* 5.6*   ALB 2.9* 2.8*   GLOB 2.9 2.8     No results for input(s): PH, PCO2, PO2 in the last 72 hours. Assessment/Plan:   1. Continue telemetry monitoring while hospitalized  2. Continue to monitor serum electrolytes, and renal function  3. Continue current cardiovascular medications including amlodipine, carvedilol, heparin, hydralazine, insulin, and isosorbide  4. Arrange wearable cardio defibrillator (LifeVest) prior to discharge  5.   The patient is to follow-up with Santhosh Hermosillo MD within 1 to 2 weeks after discharge for continued cardiovascular care     Moises Humphrey MD

## 2021-03-05 NOTE — ANESTHESIA PROCEDURE NOTES
Emergent Intubation  Performed by: Danielle Garcia DO  Authorized by: Danielle Garcia DO     Emergent Intubation:   Location:  ICU  Date/Time:  2/27/2021 12:30 PM    Spontaneous Ventilation: absent    Preoxygenated: Yes      Airway Documentation:   Airway:  ETT - Cuffed  Advanced Technique:  Chloe  Insertion Site:  Oral  ETT size (mm):  7.5  ETT Insertion depth (cm):  24  Placement verified by: auscultation, EtCO2 and BBS    Attempts:  1  Difficult airway: No    Anesthesia called for urgent/emergent intubation. Patient was identified using two patient identification methods. 100% oxygen via Ambu bag, wall suction, emergency medications, and intubating supplies were readily available. Patient pre-oxygenated with 100% oxygen via Ambu bag. Rapid sequence induction was performed using anesthesia induction agents and muscle relaxants as needed. Patient was intubated with endotracheal tube. End tidal carbon dioxide was confirmed via colorimetry, capnography, bilateral breath sounds, and chest x-ray as needed per the primary team. Patient's vital signs were as expected after induction and/or endotracheal intubation. Sedation recommendations were given to the primary team. The patient tolerated the procedure well and no complications were observed.

## 2021-03-05 NOTE — PROGRESS NOTES
Renal Daily Progress Note    Admit Date: 2/26/2021      Subjective:   She was seen  today. She is still on IV dobutamine. She has been started on mirtazapine as an appetite stimulant.     Current Facility-Administered Medications   Medication Dose Route Frequency    mirtazapine (REMERON) tablet 7.5 mg  7.5 mg Oral QHS    predniSONE (DELTASONE) tablet 20 mg  20 mg Oral BID WITH MEALS    hydrALAZINE (APRESOLINE) tablet 25 mg  25 mg Oral TID    isosorbide dinitrate (ISORDIL) tablet 20 mg  20 mg Oral TID    heparin (porcine) 1,000 unit/mL injection 3,600 Units  3,600 Units Hemodialysis Q TU, TH & SAT    hydrALAZINE (APRESOLINE) 20 mg/mL injection 10 mg  10 mg IntraVENous Q6H PRN    amLODIPine (NORVASC) tablet 10 mg  10 mg Oral DAILY    carvediloL (COREG) tablet 6.25 mg  6.25 mg Oral BID WITH MEALS    hydrOXYzine pamoate (VISTARIL) capsule 50 mg  50 mg Oral Q6H PRN    DOBUTamine (DOBUTREX) 500 mg/250 mL (2,000 mcg/mL) infusion  5 mcg/kg/min IntraVENous CONTINUOUS    NOREPINephrine (LEVOPHED) 16,000 mcg in dextrose 5% 250 mL infusion  2-16 mcg/min IntraVENous TITRATE    insulin lispro (HUMALOG) injection   SubCUTAneous AC&HS    glucose chewable tablet 16 g  4 Tab Oral PRN    dextrose (D50W) injection syrg 12.5-25 g  25-50 mL IntraVENous PRN    glucagon (GLUCAGEN) injection 1 mg  1 mg IntraMUSCular PRN    azaTHIOprine (IMURAN) tablet 25 mg  25 mg Oral DAILY    propofol (DIPRIVAN) 10 mg/mL infusion  0-50 mcg/kg/min IntraVENous TITRATE    metoprolol (LOPRESSOR) injection 5 mg  5 mg IntraVENous Q6H PRN    vitamin B complex capsule 1 Cap  1 Cap Oral DAILY    pantoprazole (PROTONIX) tablet 40 mg  40 mg Oral DAILY    sevelamer carbonate (RENVELA) tab 800 mg  800 mg Oral TID WITH MEALS    acetaminophen (TYLENOL) tablet 650 mg  650 mg Oral Q6H PRN    Or    acetaminophen (TYLENOL) suppository 650 mg  650 mg Rectal Q6H PRN    polyethylene glycol (MIRALAX) packet 17 g  17 g Oral DAILY PRN    ondansetron (ZOFRAN ODT) tablet 4 mg  4 mg Oral Q8H PRN    Or    ondansetron (ZOFRAN) injection 4 mg  4 mg IntraVENous Q6H PRN    [Held by provider] piperacillin-tazobactam (ZOSYN) 3.375 g in 0.9% sodium chloride (MBP/ADV) 100 mL MBP  3.375 g IntraVENous Q12H        Review of Systems    Review of Systems   Respiratory: Negative for shortness of breath. Cardiovascular: Negative for chest pain. Neurological: Negative for headaches. Objective:     Patient Vitals for the past 8 hrs:   BP Temp Pulse Resp SpO2   03/05/21 1200 131/77 97.4 °F (36.3 °C) 92 20 98 %   03/05/21 0759 (!) 150/77 98.4 °F (36.9 °C) 67 16 95 %     No intake/output data recorded. 03/03 1901 - 03/05 0700  In: 120 [P.O.:120]  Out: 2500     Physical Exam:   Physical Exam   Neck: No JVD present. Cardiovascular: Regular rhythm. Pulmonary/Chest: Breath sounds normal.   Abdominal: Soft. Bowel sounds are normal.   Musculoskeletal:         General: No edema. Neurological: She is alert. Skin: Skin is warm. Right IJ permacath functioning well        XR CHEST PORT   Final Result      XR CHEST PORT   Final Result   Stable appearance of endotracheal tube, right central dialysis   catheter, gastric tube, and temporary right ventricular pacing lead. Bilateral   pulmonary edema and small pleural effusions, consistent with clinical CHF,   subjectively slightly improved. No pneumothorax or other acute change. XR CHEST PORT   Final Result   Bilateral airspace disease/edema showing slight improvement compared   to the previous study. XR CHEST PORT   Final Result   Persistent bilateral airspace disease showing slight improvement   compared to earlier study. XR CHEST PORT   Final Result   Bilateral central airspace disease/edema right greater than left   showing slight worsening on the right. CTA CHEST W OR W WO CONT   Final Result   The findings may be a combination of extensive pneumonia,   pneumonitis and edema.  No evidence of PE XR CHEST SNGL V   Final Result           Data Review   Recent Labs     03/04/21  0403 03/03/21  0400   WBC 5.7 5.7   HGB 11.1* 11.8   HCT 34.5* 36.5    176     Recent Labs     03/04/21 0403 03/03/21  0400   * 133*   K 4.5 4.8   CL 94* 95*   CO2 23 27   * 145*   BUN 84* 57*   CREA 8.01* 6.21*   CA 8.2* 8.2*   ALB 2.9* 2.8*   ALT 72 75     No components found for: GLPOC  No results for input(s): PH, PCO2, PO2, HCO3, FIO2 in the last 72 hours. No results for input(s): INR, INREXT, INREXT, INREXT in the last 72 hours. Assessment:           Active Problems:    PNA (pneumonia) (2/26/2021)      SOB (shortness of breath) (2/26/2021)    ESRD hemodialysis dependent  History of Goodpasture's syndrome  Anemia  Thrombocytopenia- resolved. Hypertension- under better control  Cardiomyopathy  Secondary hyperparathyroidism  Hyponatremia  Plan:   Hemodialysis on a Tuesday Thursday Saturday schedule. Hepatitis panel is negative ---  in preparation for in center hemodialysis. Case management to see her regarding inpatient dialysis chair. She has been on home hemodialysis and would perhaps do better at least for the short-term with in center hemodialysis on discharge.

## 2021-03-06 LAB
ALBUMIN SERPL-MCNC: 3.1 G/DL (ref 3.5–5)
ALBUMIN/GLOB SERPL: 1.1 {RATIO} (ref 1.1–2.2)
ALP SERPL-CCNC: 135 U/L (ref 45–117)
ALT SERPL-CCNC: 46 U/L (ref 12–78)
ANION GAP SERPL CALC-SCNC: 12 MMOL/L (ref 5–15)
AST SERPL W P-5'-P-CCNC: 21 U/L (ref 15–37)
BACTERIA SPEC CULT: NORMAL
BACTERIA SPEC CULT: NORMAL
BASOPHILS # BLD: 0 K/UL (ref 0–0.1)
BASOPHILS NFR BLD: 0 % (ref 0–1)
BILIRUB SERPL-MCNC: 0.3 MG/DL (ref 0.2–1)
BUN SERPL-MCNC: 78 MG/DL (ref 6–20)
BUN/CREAT SERPL: 10 (ref 12–20)
CA-I BLD-MCNC: 8.4 MG/DL (ref 8.5–10.1)
CHLORIDE SERPL-SCNC: 93 MMOL/L (ref 97–108)
CO2 SERPL-SCNC: 23 MMOL/L (ref 21–32)
CREAT SERPL-MCNC: 7.76 MG/DL (ref 0.55–1.02)
DIFFERENTIAL METHOD BLD: ABNORMAL
EOSINOPHIL # BLD: 0 K/UL (ref 0–0.4)
EOSINOPHIL NFR BLD: 0 % (ref 0–7)
ERYTHROCYTE [DISTWIDTH] IN BLOOD BY AUTOMATED COUNT: 16.2 % (ref 11.5–14.5)
GLOBULIN SER CALC-MCNC: 2.7 G/DL (ref 2–4)
GLUCOSE BLD STRIP.AUTO-MCNC: 140 MG/DL (ref 65–100)
GLUCOSE BLD STRIP.AUTO-MCNC: 250 MG/DL (ref 65–100)
GLUCOSE SERPL-MCNC: 131 MG/DL (ref 65–100)
HCT VFR BLD AUTO: 32.6 % (ref 35–47)
HGB BLD-MCNC: 10.5 G/DL (ref 11.5–16)
IMM GRANULOCYTES # BLD AUTO: 0.1 K/UL (ref 0–0.04)
IMM GRANULOCYTES NFR BLD AUTO: 1 % (ref 0–0.5)
LYMPHOCYTES # BLD: 0.4 K/UL (ref 0.8–3.5)
LYMPHOCYTES NFR BLD: 7 % (ref 12–49)
MCH RBC QN AUTO: 27.8 PG (ref 26–34)
MCHC RBC AUTO-ENTMCNC: 32.2 G/DL (ref 30–36.5)
MCV RBC AUTO: 86.2 FL (ref 80–99)
MONOCYTES # BLD: 0.4 K/UL (ref 0–1)
MONOCYTES NFR BLD: 7 % (ref 5–13)
NEUTS SEG # BLD: 5 K/UL (ref 1.8–8)
NEUTS SEG NFR BLD: 85 % (ref 32–75)
PERFORMED BY, TECHID: ABNORMAL
PERFORMED BY, TECHID: ABNORMAL
PLATELET # BLD AUTO: 209 K/UL (ref 150–400)
PMV BLD AUTO: 10.5 FL (ref 8.9–12.9)
POTASSIUM SERPL-SCNC: 4.6 MMOL/L (ref 3.5–5.1)
PROT SERPL-MCNC: 5.8 G/DL (ref 6.4–8.2)
RBC # BLD AUTO: 3.78 M/UL (ref 3.8–5.2)
SODIUM SERPL-SCNC: 128 MMOL/L (ref 136–145)
SPECIAL REQUESTS,SREQ: NORMAL
SPECIAL REQUESTS,SREQ: NORMAL
WBC # BLD AUTO: 5.8 K/UL (ref 3.6–11)

## 2021-03-06 PROCEDURE — 74011250637 HC RX REV CODE- 250/637: Performed by: INTERNAL MEDICINE

## 2021-03-06 PROCEDURE — 65270000029 HC RM PRIVATE

## 2021-03-06 PROCEDURE — 74011636637 HC RX REV CODE- 636/637: Performed by: INTERNAL MEDICINE

## 2021-03-06 PROCEDURE — 74011250636 HC RX REV CODE- 250/636: Performed by: LEGAL MEDICINE

## 2021-03-06 PROCEDURE — 83540 ASSAY OF IRON: CPT

## 2021-03-06 PROCEDURE — 85025 COMPLETE CBC W/AUTO DIFF WBC: CPT

## 2021-03-06 PROCEDURE — 80053 COMPREHEN METABOLIC PANEL: CPT

## 2021-03-06 PROCEDURE — 74011250636 HC RX REV CODE- 250/636: Performed by: INTERNAL MEDICINE

## 2021-03-06 PROCEDURE — 36415 COLL VENOUS BLD VENIPUNCTURE: CPT

## 2021-03-06 PROCEDURE — 74011250637 HC RX REV CODE- 250/637: Performed by: FAMILY MEDICINE

## 2021-03-06 PROCEDURE — 90935 HEMODIALYSIS ONE EVALUATION: CPT

## 2021-03-06 PROCEDURE — 82962 GLUCOSE BLOOD TEST: CPT

## 2021-03-06 RX ORDER — SEVELAMER CARBONATE 800 MG/1
1600 TABLET, FILM COATED ORAL
Status: DISCONTINUED | OUTPATIENT
Start: 2021-03-06 | End: 2021-03-08 | Stop reason: HOSPADM

## 2021-03-06 RX ORDER — HEPARIN SODIUM 1000 [USP'U]/ML
INJECTION, SOLUTION INTRAVENOUS; SUBCUTANEOUS
Status: DISPENSED
Start: 2021-03-06 | End: 2021-03-06

## 2021-03-06 RX ADMIN — PREDNISONE 20 MG: 20 TABLET ORAL at 12:46

## 2021-03-06 RX ADMIN — ISOSORBIDE DINITRATE 20 MG: 20 TABLET ORAL at 21:41

## 2021-03-06 RX ADMIN — HYDRALAZINE HYDROCHLORIDE 25 MG: 25 TABLET ORAL at 16:45

## 2021-03-06 RX ADMIN — DOBUTAMINE IN DEXTROSE 5 MCG/KG/MIN: 200 INJECTION, SOLUTION INTRAVENOUS at 04:24

## 2021-03-06 RX ADMIN — MIRTAZAPINE 7.5 MG: 15 TABLET, FILM COATED ORAL at 21:40

## 2021-03-06 RX ADMIN — ISOSORBIDE DINITRATE 20 MG: 20 TABLET ORAL at 12:46

## 2021-03-06 RX ADMIN — HYDRALAZINE HYDROCHLORIDE 25 MG: 25 TABLET ORAL at 12:46

## 2021-03-06 RX ADMIN — PREDNISONE 20 MG: 20 TABLET ORAL at 16:45

## 2021-03-06 RX ADMIN — CARVEDILOL 6.25 MG: 3.12 TABLET, FILM COATED ORAL at 16:45

## 2021-03-06 RX ADMIN — AMLODIPINE BESYLATE 10 MG: 5 TABLET ORAL at 12:46

## 2021-03-06 RX ADMIN — SEVELAMER CARBONATE 1600 MG: 800 TABLET, FILM COATED ORAL at 12:46

## 2021-03-06 RX ADMIN — AZATHIOPRINE 25 MG: 50 TABLET ORAL at 12:46

## 2021-03-06 RX ADMIN — PANTOPRAZOLE SODIUM 40 MG: 40 TABLET, DELAYED RELEASE ORAL at 12:46

## 2021-03-06 RX ADMIN — CARVEDILOL 6.25 MG: 3.12 TABLET, FILM COATED ORAL at 12:46

## 2021-03-06 RX ADMIN — Medication 1 CAPSULE: at 12:46

## 2021-03-06 RX ADMIN — HYDRALAZINE HYDROCHLORIDE 25 MG: 25 TABLET ORAL at 21:40

## 2021-03-06 RX ADMIN — SEVELAMER CARBONATE 1600 MG: 800 TABLET, FILM COATED ORAL at 16:45

## 2021-03-06 RX ADMIN — ISOSORBIDE DINITRATE 20 MG: 20 TABLET ORAL at 16:45

## 2021-03-06 NOTE — PROGRESS NOTES
Progress Note      3/6/2021 11:43 AM  NAME: Zuleyka Jimenez   MRN:  [id number]   Admit Diagnosis: PNA (pneumonia) [J18.9]  SOB (shortness of breath) [R06.02]      Problem List:   1.  Incomplete database secondary to altered mental status  2.  Patient presents for shortness of breath   3.  Acute hypoxic respiratory failure requiring intubation-now extubated  4.  Pneumonia  5.  Fluid overload, pulmonary edema  6.  Goodpasture's syndrome  7.  End-stage renal disease dialysis dependent  8.  Cardiomyopathy (ejection fraction =17%)  9.  Grade I (mild) diastolic dysfunction, or impaired relaxation  10.  Mild pulmonary hypertension (RVSP =42 mmHg)     11.  Valvular heart disease         11a.  Mild to moderate tricuspid regurgitation         11b.  Mild pulmonic regurgitation         11c.  Mild to moderate mitral regurgitation  12.  Profound bradycardia corrected  13.  Possible seizure disorder  14.  Gastroesophageal reflux disease (GERD)  16.  Thrombocytopenia  17.  Electrolyte abnormalities (hyponatremia, hypochloremia, and hypocalcemia)       Subjective:      Patient observed resting in bed with eyes opened. Alert & oriented x 3. No complaints expressed. No acute distress noted.  indicates the patient is in the process of being evaluated for kidney transplantation at the [facility]. He also indicates they desire the patient's primary cardiologist to be Doris Heaton MD, with the [address] She has agreed to accept the patient. Given the patient's significant cardiomyopathy (ejection fraction =17%) the patient will require a LifeVest prior to discharge. Discussed with RN events overnight.      Medications Personally Reviewed:    Current Facility-Administered Medications   Medication Dose Route Frequency    heparin (porcine) 1,000 unit/mL injection        sevelamer carbonate (RENVELA) tab 1,600 mg  1,600 mg Oral TID WITH MEALS    mirtazapine (REMERON) tablet 7.5 mg  7.5 mg Oral QHS    predniSONE (DELTASONE) tablet 20 mg  20 mg Oral BID WITH MEALS    hydrALAZINE (APRESOLINE) tablet 25 mg  25 mg Oral TID    isosorbide dinitrate (ISORDIL) tablet 20 mg  20 mg Oral TID    heparin (porcine) 1,000 unit/mL injection 3,600 Units  3,600 Units Hemodialysis Q TU, TH & SAT    hydrALAZINE (APRESOLINE) 20 mg/mL injection 10 mg  10 mg IntraVENous Q6H PRN    amLODIPine (NORVASC) tablet 10 mg  10 mg Oral DAILY    carvediloL (COREG) tablet 6.25 mg  6.25 mg Oral BID WITH MEALS    hydrOXYzine pamoate (VISTARIL) capsule 50 mg  50 mg Oral Q6H PRN    DOBUTamine (DOBUTREX) 500 mg/250 mL (2,000 mcg/mL) infusion  5 mcg/kg/min IntraVENous CONTINUOUS    NOREPINephrine (LEVOPHED) 16,000 mcg in dextrose 5% 250 mL infusion  2-16 mcg/min IntraVENous TITRATE    insulin lispro (HUMALOG) injection   SubCUTAneous AC&HS    glucose chewable tablet 16 g  4 Tab Oral PRN    dextrose (D50W) injection syrg 12.5-25 g  25-50 mL IntraVENous PRN    glucagon (GLUCAGEN) injection 1 mg  1 mg IntraMUSCular PRN    azaTHIOprine (IMURAN) tablet 25 mg  25 mg Oral DAILY    propofol (DIPRIVAN) 10 mg/mL infusion  0-50 mcg/kg/min IntraVENous TITRATE    metoprolol (LOPRESSOR) injection 5 mg  5 mg IntraVENous Q6H PRN    vitamin B complex capsule 1 Cap  1 Cap Oral DAILY    pantoprazole (PROTONIX) tablet 40 mg  40 mg Oral DAILY    acetaminophen (TYLENOL) tablet 650 mg  650 mg Oral Q6H PRN    Or    acetaminophen (TYLENOL) suppository 650 mg  650 mg Rectal Q6H PRN    polyethylene glycol (MIRALAX) packet 17 g  17 g Oral DAILY PRN    ondansetron (ZOFRAN ODT) tablet 4 mg  4 mg Oral Q8H PRN    Or    ondansetron (ZOFRAN) injection 4 mg  4 mg IntraVENous Q6H PRN    [Held by provider] piperacillin-tazobactam (ZOSYN) 3.375 g in 0.9% sodium chloride (MBP/ADV) 100 mL MBP  3.375 g IntraVENous Q12H           Objective:      Physical Exam:  Last 24hrs VS reviewed since prior progress note.  Most recent are:    Visit Vitals  BP (!) 154/86 (BP Patient Position: At rest)   Pulse 89   Temp 98 °F (36.7 °C)   Resp 20   Ht 5' 2.99\" (1.6 m)   Wt 48.8 kg (107 lb 9.4 oz)   SpO2 98%   BMI 19.06 kg/m²       Intake/Output Summary (Last 24 hours) at 3/6/2021 1248  Last data filed at 3/6/2021 1108  Gross per 24 hour   Intake    Output 2500 ml   Net -2500 ml        General Appearance: Well developed, in no acute respiratory distress. Chest: Lungs clear to auscultation bilaterally. Cardiovascular: JVP is not elevated, PMI is not attempted, normal intensity S1 and S2, without S3. Abdomen: Soft, non-tender, bowel sounds are active. Extremities: No edema bilaterally. Data Review    Telemetry: Sinus rhythm without ventricular ectopy    EKG:   [x]  No new EKG for review    Lab Data Personally Reviewed:    Recent Labs     03/06/21 0714 03/05/21  1413   WBC 5.8 6.6   HGB 10.5* 11.7   HCT 32.6* 36.6    213     No results for input(s): INR, PTP, APTT, INREXT, INREXT in the last 72 hours. Recent Labs     03/06/21 0714 03/05/21 1413 03/04/21  0403   * 129* 131*   K 4.6 4.3 4.5   CL 93* 92* 94*   CO2 23 24 23   BUN 78* 60* 84*   CREA 7.76* 6.76* 8.01*   * 147* 112*   CA 8.4* 8.6 8.2*     No results for input(s): CPK, CKNDX, TROIQ in the last 72 hours. No lab exists for component: CPKMB  No results found for: CHOL, CHOLX, CHLST, CHOLV, HDL, HDLP, LDL, LDLC, DLDLP, Shruthi Levels, CHHD, CHHDX    Recent Labs     03/06/21 0714 03/05/21 1413 03/04/21  0403   * 92 70   TP 5.8* 6.4 5.8*   ALB 3.1* 3.3* 2.9*   GLOB 2.7 3.1 2.9     No results for input(s): PH, PCO2, PO2 in the last 72 hours. Assessment/Plan:   1. Sinus rhythm on telemetry without acute cardiovascular events overnight  2.  Hgb/hct stable, Bun/creat trending upward. 3.  Vital signs are hemodynamically stable  4. Continue current cardiovascular medications including amlodipine, carvedilol, hydralazine, and isosorbide  5.   Arrange wearable cardio defibrillator (LifeVest) prior to discharge  6.   The patient is to follow-up with All Trinh MD within 1 to 2 weeks after discharge for continued cardiovascular care     Rafat Rand NP

## 2021-03-06 NOTE — PROGRESS NOTES
Nephrology Progress Note  Date:3/6/2021       Room:[facility]  Patient Name:Kaitlynn Romero     Date of Birth:     Age:70 y.o. Subjective    Subjective:  Symptoms:  No shortness of breath, chest pain or diarrhea. Diet:  No nausea or vomiting. Seen and evaluated at bedside during dialysis. No complaints during treatment, no adverse events reported by the RN         Review of Systems   Constitutional: Negative for fever. HENT: Negative for facial swelling. Respiratory: Negative for chest tightness and shortness of breath. Cardiovascular: Negative for chest pain and palpitations. Gastrointestinal: Negative for abdominal distention, constipation, diarrhea, nausea and vomiting. Genitourinary: Negative for dysuria and hematuria. Musculoskeletal: Negative for joint swelling. Neurological: Negative for syncope, light-headedness and numbness. Psychiatric/Behavioral: Negative for confusion. All other systems reviewed and are negative. Objective         Vitals Last 24 Hours:  TEMPERATURE:  Temp  Av.1 °F (36.7 °C)  Min: 97.4 °F (36.3 °C)  Max: 98.6 °F (37 °C)  RESPIRATIONS RANGE: Resp  Av.2  Min: 18  Max: 20  PULSE OXIMETRY RANGE: SpO2  Av.6 %  Min: 95 %  Max: 100 %  PULSE RANGE: Pulse  Av.6  Min: 70  Max: 94  BLOOD PRESSURE RANGE: Systolic (66WPF), CPZ:013 , Min:127 , BZS:917   ; Diastolic (37NBY), WZQ:63, Min:73, Max:97    I/O (24Hr): No intake or output data in the 24 hours ending 21 1109  Objective:  General Appearance: In no acute distress. Vital signs: (most recent): Blood pressure (!) 159/97, pulse 85, temperature 98.3 °F (36.8 °C), temperature source Oral, resp. rate 20, height 5' 2.99\" (1.6 m), weight 48.8 kg (107 lb 9.4 oz), SpO2 95 %. Vital signs are normal.  No fever. HEENT: Normal HEENT exam.    Lungs:  Normal effort. Heart: Normal rate. Abdomen: Abdomen is soft.   Bowel sounds are normal.   There is no abdominal tenderness. Pulses: Distal pulses are intact. Neurological: Patient is alert. Pupils:  Pupils are equal, round, and reactive to light. Skin:  Warm. Labs/Imaging/Diagnostics    Labs:  CBC:  Recent Labs     03/06/21 0714 03/05/21 1413 03/04/21  0403   WBC 5.8 6.6 5.7   RBC 3.78* 4.14 3.97   HGB 10.5* 11.7 11.1*   HCT 32.6* 36.6 34.5*   MCV 86.2 88.4 86.9   RDW 16.2* 16.1* 15.9*    213 181     CHEMISTRIES:  Recent Labs     03/06/21 0714 03/05/21 1413 03/04/21  0403   * 129* 131*   K 4.6 4.3 4.5   CL 93* 92* 94*   CO2 23 24 23   BUN 78* 60* 84*   CA 8.4* 8.6 8.2*   PT/INR:No results for input(s): INR, INREXT in the last 72 hours. No lab exists for component: PROTIME  APTT:No results for input(s): APTT in the last 72 hours. LIVER PROFILE:  Recent Labs     03/06/21  0714 03/05/21 1413 03/04/21  0403   AST 21 25 32   ALT 46 58 72     Lab Results   Component Value Date/Time    ALT (SGPT) 46 03/06/2021 07:14 AM    AST (SGOT) 21 03/06/2021 07:14 AM    Alk. phosphatase 135 (H) 03/06/2021 07:14 AM    Bilirubin, total 0.3 03/06/2021 07:14 AM     Recent Results (from the past 24 hour(s))   CBC WITH AUTOMATED DIFF    Collection Time: 03/05/21  2:13 PM   Result Value Ref Range    WBC 6.6 3.6 - 11.0 K/uL    RBC 4.14 3.80 - 5.20 M/uL    HGB 11.7 11.5 - 16.0 g/dL    HCT 36.6 35.0 - 47.0 %    MCV 88.4 80.0 - 99.0 FL    MCH 28.3 26.0 - 34.0 PG    MCHC 32.0 30.0 - 36.5 g/dL    RDW 16.1 (H) 11.5 - 14.5 %    PLATELET 337 384 - 469 K/uL    MPV 11.2 8.9 - 12.9 FL    NEUTROPHILS 91 (H) 32 - 75 %    LYMPHOCYTES 3 (L) 12 - 49 %    MONOCYTES 5 5 - 13 %    EOSINOPHILS 0 0 - 7 %    BASOPHILS 0 0 - 1 %    IMMATURE GRANULOCYTES 1 (H) 0.0 - 0.5 %    ABS. NEUTROPHILS 6.0 1.8 - 8.0 K/UL    ABS. LYMPHOCYTES 0.2 (L) 0.8 - 3.5 K/UL    ABS. MONOCYTES 0.3 0.0 - 1.0 K/UL    ABS. EOSINOPHILS 0.0 0.0 - 0.4 K/UL    ABS. BASOPHILS 0.0 0.0 - 0.1 K/UL    ABS. IMM.  GRANS. 0.1 (H) 0.00 - 0.04 K/UL    DF AUTOMATED METABOLIC PANEL, COMPREHENSIVE    Collection Time: 03/05/21  2:13 PM   Result Value Ref Range    Sodium 129 (L) 136 - 145 mmol/L    Potassium 4.3 3.5 - 5.1 mmol/L    Chloride 92 (L) 97 - 108 mmol/L    CO2 24 21 - 32 mmol/L    Anion gap 13 5 - 15 mmol/L    Glucose 147 (H) 65 - 100 mg/dL    BUN 60 (H) 6 - 20 mg/dL    Creatinine 6.76 (H) 0.55 - 1.02 mg/dL    BUN/Creatinine ratio 9 (L) 12 - 20      GFR est AA 7 (L) >60 ml/min/1.73m2    GFR est non-AA 6 (L) >60 ml/min/1.73m2    Calcium 8.6 8.5 - 10.1 mg/dL    Bilirubin, total 0.4 0.2 - 1.0 mg/dL    AST (SGOT) 25 15 - 37 U/L    ALT (SGPT) 58 12 - 78 U/L    Alk. phosphatase 92 45 - 117 U/L    Protein, total 6.4 6.4 - 8.2 g/dL    Albumin 3.3 (L) 3.5 - 5.0 g/dL    Globulin 3.1 2.0 - 4.0 g/dL    A-G Ratio 1.1 1.1 - 2.2     GLUCOSE, POC    Collection Time: 03/05/21  4:02 PM   Result Value Ref Range    Glucose (POC) 166 (H) 65 - 100 mg/dL    Performed by Dave Alanis    GLUCOSE, POC    Collection Time: 03/05/21  8:51 PM   Result Value Ref Range    Glucose (POC) 239 (H) 65 - 100 mg/dL    Performed by JULIO SAINI    CBC WITH AUTOMATED DIFF    Collection Time: 03/06/21  7:14 AM   Result Value Ref Range    WBC 5.8 3.6 - 11.0 K/uL    RBC 3.78 (L) 3.80 - 5.20 M/uL    HGB 10.5 (L) 11.5 - 16.0 g/dL    HCT 32.6 (L) 35.0 - 47.0 %    MCV 86.2 80.0 - 99.0 FL    MCH 27.8 26.0 - 34.0 PG    MCHC 32.2 30.0 - 36.5 g/dL    RDW 16.2 (H) 11.5 - 14.5 %    PLATELET 780 488 - 934 K/uL    MPV 10.5 8.9 - 12.9 FL    NEUTROPHILS 85 (H) 32 - 75 %    LYMPHOCYTES 7 (L) 12 - 49 %    MONOCYTES 7 5 - 13 %    EOSINOPHILS 0 0 - 7 %    BASOPHILS 0 0 - 1 %    IMMATURE GRANULOCYTES 1 (H) 0.0 - 0.5 %    ABS. NEUTROPHILS 5.0 1.8 - 8.0 K/UL    ABS. LYMPHOCYTES 0.4 (L) 0.8 - 3.5 K/UL    ABS. MONOCYTES 0.4 0.0 - 1.0 K/UL    ABS. EOSINOPHILS 0.0 0.0 - 0.4 K/UL    ABS. BASOPHILS 0.0 0.0 - 0.1 K/UL    ABS. IMM.  GRANS. 0.1 (H) 0.00 - 0.04 K/UL    DF AUTOMATED     METABOLIC PANEL, COMPREHENSIVE    Collection Time: 03/06/21  7:14 AM   Result Value Ref Range    Sodium 128 (L) 136 - 145 mmol/L    Potassium 4.6 3.5 - 5.1 mmol/L    Chloride 93 (L) 97 - 108 mmol/L    CO2 23 21 - 32 mmol/L    Anion gap 12 5 - 15 mmol/L    Glucose 131 (H) 65 - 100 mg/dL    BUN 78 (H) 6 - 20 mg/dL    Creatinine 7.76 (H) 0.55 - 1.02 mg/dL    BUN/Creatinine ratio 10 (L) 12 - 20      GFR est AA 6 (L) >60 ml/min/1.73m2    GFR est non-AA 5 (L) >60 ml/min/1.73m2    Calcium 8.4 (L) 8.5 - 10.1 mg/dL    Bilirubin, total 0.3 0.2 - 1.0 mg/dL    AST (SGOT) 21 15 - 37 U/L    ALT (SGPT) 46 12 - 78 U/L    Alk. phosphatase 135 (H) 45 - 117 U/L    Protein, total 5.8 (L) 6.4 - 8.2 g/dL    Albumin 3.1 (L) 3.5 - 5.0 g/dL    Globulin 2.7 2.0 - 4.0 g/dL    A-G Ratio 1.1 1.1 - 2.2         Imaging Last 24 Hours:  No results found.        Current Facility-Administered Medications:     heparin (porcine) 1,000 unit/mL injection, , , ,     mirtazapine (REMERON) tablet 7.5 mg, 7.5 mg, Oral, QHS, [facility] Hospital[facility] MD, 7.5 mg at 03/05/21 2056    predniSONE (DELTASONE) tablet 20 mg, 20 mg, Oral, BID WITH MEALS, Nasir Rajput MD, 20 mg at 03/05/21 1627    hydrALAZINE (APRESOLINE) tablet 25 mg, 25 mg, Oral, TID, Roberta SIMS MD, 25 mg at 03/05/21 2055    isosorbide dinitrate (ISORDIL) tablet 20 mg, 20 mg, Oral, TID, Roberta SIMS MD, 20 mg at 03/05/21 2055    heparin (porcine) 1,000 unit/mL injection 3,600 Units, 3,600 Units, Hemodialysis, Q TU, TH & SAT, Mp Gonzalez MD, 3,600 Units at 03/04/21 0827    hydrALAZINE (APRESOLINE) 20 mg/mL injection 10 mg, 10 mg, IntraVENous, Q6H PRN, Eric Garcia MD    amLODIPine (NORVASC) tablet 10 mg, 10 mg, Oral, DAILY, Roberta SIMS MD, 10 mg at 03/05/21 0816    carvediloL (COREG) tablet 6.25 mg, 6.25 mg, Oral, BID WITH MEALS, Eric Garcia MD, 6.25 mg at 03/05/21 1627    hydrOXYzine pamoate (VISTARIL) capsule 50 mg, 50 mg, Oral, Q6H PRN, Kim George MD, 50 mg at 02/27/21 0528    DOBUTamine (DOBUTREX) 500 mg/250 mL (2,000 mcg/mL) infusion, 5 mcg/kg/min, IntraVENous, CONTINUOUS, Maximo SIMS MD, Last Rate: 10.2 mL/hr at 03/06/21 0424, 5 mcg/kg/min at 03/06/21 0424    NOREPINephrine (LEVOPHED) 16,000 mcg in dextrose 5% 250 mL infusion, 2-16 mcg/min, IntraVENous, TITRATE, Bushra George MD    insulin lispro (HUMALOG) injection, , SubCUTAneous, AC&HS, Kim George MD, 2 Units at 03/05/21 2056    glucose chewable tablet 16 g, 4 Tab, Oral, PRN, Bushra George MD    dextrose (D50W) injection syrg 12.5-25 g, 25-50 mL, IntraVENous, PRN, Bushra George MD    glucagon (GLUCAGEN) injection 1 mg, 1 mg, IntraMUSCular, PRN, Ashley Lujan MD  [facility]  azaTHIOprine (IMURAN) tablet 25 mg, 25 mg, Oral, DAILY, Willian Powell MD, 25 mg at 03/05/21 0816    propofol (DIPRIVAN) 10 mg/mL infusion, 0-50 mcg/kg/min, IntraVENous, TITRATE, Bushra George MD, Stopped at 03/01/21 0815    metoprolol (LOPRESSOR) injection 5 mg, 5 mg, IntraVENous, Q6H PRN, Maximo SIMS MD, 5 mg at 02/27/21 1646    vitamin B complex capsule 1 Cap, 1 Cap, Oral, DAILY, Kim George MD, 1 Cap at 03/05/21 0816    pantoprazole (PROTONIX) tablet 40 mg, 40 mg, Oral, DAILY, Kim George MD, 40 mg at 03/05/21 0816    sevelamer carbonate (RENVELA) tab 800 mg, 800 mg, Oral, TID WITH MEALS, Bushra George MD, 800 mg at 03/05/21 1627    acetaminophen (TYLENOL) tablet 650 mg, 650 mg, Oral, Q6H PRN **OR** acetaminophen (TYLENOL) suppository 650 mg, 650 mg, Rectal, Q6H PRN, Bushra George MD    polyethylene glycol (MIRALAX) packet 17 g, 17 g, Oral, DAILY PRN, Bushra George MD    ondansetron (ZOFRAN ODT) tablet 4 mg, 4 mg, Oral, Q8H PRN **OR** ondansetron (ZOFRAN) injection 4 mg, 4 mg, IntraVENous, Q6H PRN, Kim George MD    [Held by provider] piperacillin-tazobactam (ZOSYN) 3.375 g in 0.9% sodium chloride (MBP/ADV) 100 mL MBP, 3.375 g, IntraVENous, Q12H, Bushra George MD, Stopped at 03/04/21 0900    Assessment//Plan   Active Problems:    PNA (pneumonia) (2/26/2021)      SOB (shortness of breath) (2/26/2021)      Assessment & Plan     1. ESRD on HD- dialysis indicated today for metabolic clearance and volume management  C/w strict I/O monitoring  C/w strict renal diet as tolerated    2. Anemia of chronic disease - h/h stable. C/w cbc trend for additional monitoring   3. Secondary hyperparathyroidism -  c/w renvela 1600mg PO TID   4.  HTN - hold BP meds before dialysis   5. Hep B serologies non reactive  6. CHF c/b Fluid overload - c/w strict fluid restriction <1L/d. Will max'd UF during dialysis.          Electronically signed by Heladio Maier MD on 3/6/2021 at 11:13 AM

## 2021-03-06 NOTE — CONSULTS
PULMONARY NOTE  [facility]    Name: Fabiano Bansal MRN: [id number]   : 1950 Hospital: [facility]   Date: 3/6/2021  Admission date: 2021 Hospital Day: 9       HPI:     Hospital Problems  Never Reviewed          Codes Class Noted POA    PNA (pneumonia) ICD-10-CM: J18.9  ICD-9-CM: 589  2021 Unknown        SOB (shortness of breath) ICD-10-CM: R06.02  ICD-9-CM: 786.05  2021 Unknown                   [x] High complexity decision making was performed  [x] See my orders for details      Subjective/Initial History:     I was asked by Mike Crump MD to see Fabiano Bansal  a 72 y.o.  female in consultation     Excerpts from admission 2021 or consult notes as follows:   71-year-old lady came in because of shortness of breath and dyspnea significant past medical history of end-stage renal disease on hemodialysis, Goodpasture syndrome anemia of chronic disease hypertension steroid-dependent, she was not able to dialysis at home because of generalized weakness and shortness of breath she has been on home dialysis no fever no chills she was put on 100% nonrebreather mask which has been switched to noninvasive ventilator not she is going for dialysis because of severe hypoxia so pulmonary critical care consult was called for further evaluation. She is a lifetime non-smoker.       No Known Allergies     MAR reviewed and pertinent medications noted or modified as needed     Current Facility-Administered Medications   Medication    mirtazapine (REMERON) tablet 7.5 mg    predniSONE (DELTASONE) tablet 20 mg    hydrALAZINE (APRESOLINE) tablet 25 mg    isosorbide dinitrate (ISORDIL) tablet 20 mg    heparin (porcine) 1,000 unit/mL injection 3,600 Units    hydrALAZINE (APRESOLINE) 20 mg/mL injection 10 mg    amLODIPine (NORVASC) tablet 10 mg    carvediloL (COREG) tablet 6.25 mg    hydrOXYzine pamoate (VISTARIL) capsule 50 mg    DOBUTamine (DOBUTREX) 500 mg/250 mL (2,000 mcg/mL) infusion    NOREPINephrine (LEVOPHED) 16,000 mcg in dextrose 5% 250 mL infusion    insulin lispro (HUMALOG) injection    glucose chewable tablet 16 g    dextrose (D50W) injection syrg 12.5-25 g    glucagon (GLUCAGEN) injection 1 mg    azaTHIOprine (IMURAN) tablet 25 mg    propofol (DIPRIVAN) 10 mg/mL infusion    metoprolol (LOPRESSOR) injection 5 mg    vitamin B complex capsule 1 Cap    pantoprazole (PROTONIX) tablet 40 mg    sevelamer carbonate (RENVELA) tab 800 mg    acetaminophen (TYLENOL) tablet 650 mg    Or    acetaminophen (TYLENOL) suppository 650 mg    polyethylene glycol (MIRALAX) packet 17 g    ondansetron (ZOFRAN ODT) tablet 4 mg    Or    ondansetron (ZOFRAN) injection 4 mg    [Held by provider] piperacillin-tazobactam (ZOSYN) 3.375 g in 0.9% sodium chloride (MBP/ADV) 100 mL MBP      Patient PCP: None  PMH:  has a past medical history of Chronic kidney disease and Heart failure ([facility] [address] PSH:   has no past surgical history on file. FHX: family history is not on file. SHX:  reports that she has never smoked. She has never used smokeless tobacco. She reports previous alcohol use. She reports previous drug use. ROS:  Unable to obtain as patient was lethargic and severely short of breath      Objective:     Vital Signs: Telemetry:    normal sinus rhythm Intake/Output:   Visit Vitals  BP (!) 159/89   Pulse 72   Temp 98.3 °F (36.8 °C) (Oral)   Resp 20   Ht 5' 2.99\" (1.6 m)   Wt 48.8 kg (107 lb 9.4 oz)   SpO2 95%   BMI 19.06 kg/m²       Temp (24hrs), Av.1 °F (36.7 °C), Min:97.4 °F (36.3 °C), Max:98.6 °F (37 °C)        O2 Device: Room air O2 Flow Rate (L/min): 0 l/min       Wt Readings from Last 4 Encounters:   21 48.8 kg (107 lb 9.4 oz)        No intake or output data in the 24 hours ending 21 0934    Last shift:      No intake/output data recorded. Last 3 shifts: No intake/output data recorded.        Physical Exam:   Patient is intubated on ventilator  Physical Exam   Constitutional: She appears distressed. HENT:   Head: Normocephalic and atraumatic. Eyes: Pupils are equal, round, and reactive to light. EOM are normal.   Neck: Normal range of motion. Neck supple. Cardiovascular: Normal rate and regular rhythm. Pulmonary/Chest: She is in respiratory distress. She has rales. Abdominal: Soft. Musculoskeletal: Normal range of motion. Neurological: She is alert. Skin: Skin is warm. Labs:    Recent Labs     03/06/21 0714 03/05/21 1413 03/04/21  0403   WBC 5.8 6.6 5.7   HGB 10.5* 11.7 11.1*    213 181     Recent Labs     03/06/21 0714 03/05/21 1413 03/04/21  0403   * 129* 131*   K 4.6 4.3 4.5   CL 93* 92* 94*   CO2 23 24 23   * 147* 112*   BUN 78* 60* 84*   CREA 7.76* 6.76* 8.01*   CA 8.4* 8.6 8.2*   ALB 3.1* 3.3* 2.9*   ALT 46 58 72     No results for input(s): PH, PCO2, PO2, HCO3, FIO2 in the last 72 hours. No results for input(s): CPK, CKNDX, TROIQ in the last 72 hours. No lab exists for component: CPKMB  No results found for: BNPP, BNP   Lab Results   Component Value Date/Time    Culture result: No growth 2 days 02/28/2021 09:30 AM    Culture result: No growth 5 days 02/27/2021 01:58 AM    Culture result: No growth 5 days 02/27/2021 01:45 AM   No results found for: TSH, TSHEXT, TSHEXT    Imaging:    CXR Results  (Last 48 hours)    None        Results from Hospital Encounter encounter on 02/26/21   XR CHEST PORT    Narrative Chest single view. Comparison single view chest March 1, 2021    ET tube and NG tube have been removed. Stable right-sided permacath. Unchanged  appearance for the lungs. No gross interstitial or alveolar pulmonary edema. Cardiac and mediastinal structures unchanged. No pneumothorax or sizable pleural  effusion. XR CHEST PORT    Narrative Portable chest, 0318 hours, compared with 2/28/2021.       Impression Stable appearance of endotracheal tube, right central dialysis  catheter, gastric tube, and temporary right ventricular pacing lead. Bilateral  pulmonary edema and small pleural effusions, consistent with clinical CHF,  subjectively slightly improved. No pneumothorax or other acute change. XR CHEST PORT    Narrative Upright radiograph chest 11:12 AM compared with 3/27/2021 at 7:19 AM.    INDICATION: Tachypnea, pneumonia. Right IJ central line remains in place. Heart size is stable. Extensive  bilateral airspace disease, right greater than left, in a predominantly central  distribution shows minimal improvement compared to the previous study. Defibrillator pads project over the right upper chest and left lower chest. No  pneumothorax. Definite displaced fractures. Next      Impression Persistent bilateral airspace disease showing slight improvement  compared to earlier study. Results from [place] encounter on 02/26/21   CTA CHEST W OR W WO CONT    Narrative CT dose reduction was achieved through use of a standardized protocol tailored  for this examination and automatic exposure control for dose modulation. Contrast study maximum intensity projections    There is a extensive diffuse lung disease. Dense consolidation is seen  throughout much of the right lower lobe and there is mild variable groundglass  opacification and small foci of dense consolidation scattered elsewhere  throughout much of each lung, right greater than left, with subpleural sparing,  mostly on the LEFT. Right middle lobe is disproportionately less involved, as is  the anterior left upper lobe. Central airways are open    Pulmonary arteries are densely opacified and show no filling defect or  distortion. Aorta shows normal dimensions, without dissection. No mediastinal or  hilar adenopathy. Small moderate symmetric pleural effusions. No pericardial  effusion. No significant abdominal finding.  Body wall edema      Impression The findings may be a combination of extensive pneumonia,  pneumonitis and edema. No evidence of PE         IMPRESSION:   1. Acute hypoxic respiratory failure  2. History of Goodpasture syndrome  3. Severe cardiomyopathy ejection fraction about 17%  4. Fluid overload pulmonary edema  5. End-stage renal disease on hemodialysis  6. Neutropenia and hyperkalemia  7. Anemia  8. Bradycardia resolved  Body mass index is 19.06 kg/m². RECOMMENDATIONS/PLAN:     1. Extubated on 3/1 patient is on room air now  2. CAT scan of the chest shows pulmonary edema CHF with prior history of Goodpasture syndrome. 3. Agree with dialysis   4. Patient on prednisone  5.  Patient is afebrile normal white count  6. 2D echo shows ejection fraction of 17% off aminophylline and dobutamine         ·           Reggie Ford MD

## 2021-03-06 NOTE — DIALYSIS
Late entry  Patient dialyzed in the suite today for 3 hours and removed 2.5kgs.  No issues noted with tx

## 2021-03-06 NOTE — PROGRESS NOTES
Progress Note    Patient: Keo Aldrich MRN: [id number]  SSN: [id number]    YOB: 1950  Age: 72 y.o. Sex: female      Admit Date: 2/26/2021    LOS: 8 days     Subjective:   70 years of acute on positive to failure, Goodpasture's syndrome, cardiomyopathy severe EF of 17% fluid overload end-stage renal disease      Objective:     Vitals:    03/06/21 1100 03/06/21 1108 03/06/21 1212 03/06/21 1652   BP: (!) 145/91 (!) 165/96 (!) 154/86 126/71   Pulse: 96 97 89 94   Resp:   20 20   Temp:   98 °F (36.7 °C) 98.4 °F (36.9 °C)   TempSrc:       SpO2:   98% 98%   Weight:       Height:            Intake and Output:  Current Shift: 03/06 0701 - 03/06 1900  In: -   Out: 2500   Last three shifts: No intake/output data recorded. Physical Exam:   General:  Alert, cooperative, no distress, appears stated age. Eyes:  Conjunctivae/corneas clear. PERRL, EOMs intact. Fundi benign   Ears:  Normal TMs and external ear canals both ears. Nose: Nares normal. Septum midline. Mucosa normal. No drainage or sinus tenderness. Mouth/Throat: Lips, mucosa, and tongue normal. Teeth and gums normal.   Neck: Supple, symmetrical, trachea midline, no adenopathy, thyroid: no enlargment/tenderness/nodules, no carotid bruit and no JVD. Back:   Symmetric, no curvature. ROM normal. No CVA tenderness. Lungs:   Clear to auscultation bilaterally. Heart:  Regular rate and rhythm, S1, S2 normal, no murmur, click, rub or gallop. Abdomen:   Soft, non-tender. Bowel sounds normal. No masses,  No organomegaly. Extremities: Extremities normal, atraumatic, no cyanosis or edema. Pulses: 2+ and symmetric all extremities. Skin: Skin color, texture, turgor normal. No rashes or lesions   Lymph nodes: Cervical, supraclavicular, and axillary nodes normal.   Neurologic: CNII-XII intact. Normal strength, sensation and reflexes throughout. Lab/Data Review: All lab results for the last 24 hours reviewed.      Recent Results (from the past 24 hour(s))   GLUCOSE, POC    Collection Time: 03/05/21  8:51 PM   Result Value Ref Range    Glucose (POC) 239 (H) 65 - 100 mg/dL    Performed by JULIO SAINI    CBC WITH AUTOMATED DIFF    Collection Time: 03/06/21  7:14 AM   Result Value Ref Range    WBC 5.8 3.6 - 11.0 K/uL    RBC 3.78 (L) 3.80 - 5.20 M/uL    HGB 10.5 (L) 11.5 - 16.0 g/dL    HCT 32.6 (L) 35.0 - 47.0 %    MCV 86.2 80.0 - 99.0 FL    MCH 27.8 26.0 - 34.0 PG    MCHC 32.2 30.0 - 36.5 g/dL    RDW 16.2 (H) 11.5 - 14.5 %    PLATELET 852 513 - 651 K/uL    MPV 10.5 8.9 - 12.9 FL    NEUTROPHILS 85 (H) 32 - 75 %    LYMPHOCYTES 7 (L) 12 - 49 %    MONOCYTES 7 5 - 13 %    EOSINOPHILS 0 0 - 7 %    BASOPHILS 0 0 - 1 %    IMMATURE GRANULOCYTES 1 (H) 0.0 - 0.5 %    ABS. NEUTROPHILS 5.0 1.8 - 8.0 K/UL    ABS. LYMPHOCYTES 0.4 (L) 0.8 - 3.5 K/UL    ABS. MONOCYTES 0.4 0.0 - 1.0 K/UL    ABS. EOSINOPHILS 0.0 0.0 - 0.4 K/UL    ABS. BASOPHILS 0.0 0.0 - 0.1 K/UL    ABS. IMM. GRANS. 0.1 (H) 0.00 - 0.04 K/UL    DF AUTOMATED     METABOLIC PANEL, COMPREHENSIVE    Collection Time: 03/06/21  7:14 AM   Result Value Ref Range    Sodium 128 (L) 136 - 145 mmol/L    Potassium 4.6 3.5 - 5.1 mmol/L    Chloride 93 (L) 97 - 108 mmol/L    CO2 23 21 - 32 mmol/L    Anion gap 12 5 - 15 mmol/L    Glucose 131 (H) 65 - 100 mg/dL    BUN 78 (H) 6 - 20 mg/dL    Creatinine 7.76 (H) 0.55 - 1.02 mg/dL    BUN/Creatinine ratio 10 (L) 12 - 20      GFR est AA 6 (L) >60 ml/min/1.73m2    GFR est non-AA 5 (L) >60 ml/min/1.73m2    Calcium 8.4 (L) 8.5 - 10.1 mg/dL    Bilirubin, total 0.3 0.2 - 1.0 mg/dL    AST (SGOT) 21 15 - 37 U/L    ALT (SGPT) 46 12 - 78 U/L    Alk.  phosphatase 135 (H) 45 - 117 U/L    Protein, total 5.8 (L) 6.4 - 8.2 g/dL    Albumin 3.1 (L) 3.5 - 5.0 g/dL    Globulin 2.7 2.0 - 4.0 g/dL    A-G Ratio 1.1 1.1 - 2.2     GLUCOSE, POC    Collection Time: 03/06/21  5:19 PM   Result Value Ref Range    Glucose (POC) 250 (H) 65 - 100 mg/dL    Performed by Mahesh Crump          Assessment: Active Problems:    PNA (pneumonia) (2/26/2021)      SOB (shortness of breath) (2/26/2021)    Cardiomyopathy  Acute hypoxic respiratory failure  Fluid overload  End-stage renal disease    Plan:     Hemodialysis    Signed By: Ean Degroot MD     March 6, 2021

## 2021-03-07 LAB
GLUCOSE BLD STRIP.AUTO-MCNC: 104 MG/DL (ref 65–100)
GLUCOSE BLD STRIP.AUTO-MCNC: 141 MG/DL (ref 65–100)
GLUCOSE BLD STRIP.AUTO-MCNC: 195 MG/DL (ref 65–100)
GLUCOSE BLD STRIP.AUTO-MCNC: 98 MG/DL (ref 65–100)
PERFORMED BY, TECHID: ABNORMAL
PERFORMED BY, TECHID: NORMAL

## 2021-03-07 PROCEDURE — 65270000029 HC RM PRIVATE

## 2021-03-07 PROCEDURE — 74011250637 HC RX REV CODE- 250/637: Performed by: INTERNAL MEDICINE

## 2021-03-07 PROCEDURE — 74011636637 HC RX REV CODE- 636/637: Performed by: FAMILY MEDICINE

## 2021-03-07 PROCEDURE — 74011636637 HC RX REV CODE- 636/637: Performed by: INTERNAL MEDICINE

## 2021-03-07 PROCEDURE — 74011250636 HC RX REV CODE- 250/636: Performed by: LEGAL MEDICINE

## 2021-03-07 PROCEDURE — 74011250636 HC RX REV CODE- 250/636: Performed by: INTERNAL MEDICINE

## 2021-03-07 PROCEDURE — 82962 GLUCOSE BLOOD TEST: CPT

## 2021-03-07 PROCEDURE — 74011250637 HC RX REV CODE- 250/637: Performed by: FAMILY MEDICINE

## 2021-03-07 RX ADMIN — PREDNISONE 20 MG: 20 TABLET ORAL at 08:41

## 2021-03-07 RX ADMIN — CARVEDILOL 6.25 MG: 3.12 TABLET, FILM COATED ORAL at 17:16

## 2021-03-07 RX ADMIN — PREDNISONE 20 MG: 20 TABLET ORAL at 17:16

## 2021-03-07 RX ADMIN — ISOSORBIDE DINITRATE 20 MG: 20 TABLET ORAL at 08:41

## 2021-03-07 RX ADMIN — HYDRALAZINE HYDROCHLORIDE 25 MG: 25 TABLET ORAL at 08:42

## 2021-03-07 RX ADMIN — AZATHIOPRINE 25 MG: 50 TABLET ORAL at 08:42

## 2021-03-07 RX ADMIN — HYDRALAZINE HYDROCHLORIDE 25 MG: 25 TABLET ORAL at 17:20

## 2021-03-07 RX ADMIN — Medication 1 CAPSULE: at 08:42

## 2021-03-07 RX ADMIN — DOBUTAMINE IN DEXTROSE 5 MCG/KG/MIN: 200 INJECTION, SOLUTION INTRAVENOUS at 17:04

## 2021-03-07 RX ADMIN — PANTOPRAZOLE SODIUM 40 MG: 40 TABLET, DELAYED RELEASE ORAL at 08:42

## 2021-03-07 RX ADMIN — HYDRALAZINE HYDROCHLORIDE 25 MG: 25 TABLET ORAL at 22:39

## 2021-03-07 RX ADMIN — SEVELAMER CARBONATE 1600 MG: 800 TABLET, FILM COATED ORAL at 08:42

## 2021-03-07 RX ADMIN — MIRTAZAPINE 7.5 MG: 15 TABLET, FILM COATED ORAL at 22:39

## 2021-03-07 RX ADMIN — AMLODIPINE BESYLATE 10 MG: 5 TABLET ORAL at 08:42

## 2021-03-07 RX ADMIN — ISOSORBIDE DINITRATE 20 MG: 20 TABLET ORAL at 22:39

## 2021-03-07 RX ADMIN — SEVELAMER CARBONATE 1600 MG: 800 TABLET, FILM COATED ORAL at 17:16

## 2021-03-07 RX ADMIN — CARVEDILOL 6.25 MG: 3.12 TABLET, FILM COATED ORAL at 08:43

## 2021-03-07 RX ADMIN — INSULIN LISPRO 3 UNITS: 100 INJECTION, SOLUTION INTRAVENOUS; SUBCUTANEOUS at 08:40

## 2021-03-07 RX ADMIN — ISOSORBIDE DINITRATE 20 MG: 20 TABLET ORAL at 17:19

## 2021-03-07 NOTE — PROGRESS NOTES
Progress Note      3/7/2021 10:08 AM  NAME: Love Hernandez   MRN:  [id number]   Admit Diagnosis: PNA (pneumonia) [J18.9]  SOB (shortness of breath) [R06.02]      Problem List:   1.  Incomplete database secondary to altered mental status  2.  Patient presents for shortness of breath   3.  Acute hypoxic respiratory failure requiring intubation-now extubated  4.  Pneumonia  5.  Fluid overload, pulmonary edema  6.  Goodpasture's syndrome  7.  End-stage renal disease dialysis dependent  8.  Cardiomyopathy (ejection fraction =17%)  9.  Grade I (mild) diastolic dysfunction, or impaired relaxation  10.  Mild pulmonary hypertension (RVSP =42 mmHg)     11.  Valvular heart disease         11a.  Mild to moderate tricuspid regurgitation         11b.  Mild pulmonic regurgitation         11c.  Mild to moderate mitral regurgitation  12.  Profound bradycardia corrected  13.  Possible seizure disorder  14.  Gastroesophageal reflux disease (GERD)  16.  Thrombocytopenia  17.  Electrolyte abnormalities (hyponatremia, hypochloremia, and hypocalcemia)       Subjective:      Patient observed up in room walking. Alert & oriented x 3. No complaints expressed. No acute distress noted. LifeVest is on. Is in the process of being evaluated for kidney transplantation at the [facility]. Will be followed by primary cardiologist Dr. Megha Ca, with the [facility] at discharge.        Medications Personally Reviewed:    Current Facility-Administered Medications   Medication Dose Route Frequency    sevelamer carbonate (RENVELA) tab 1,600 mg  1,600 mg Oral TID WITH MEALS    mirtazapine (REMERON) tablet 7.5 mg  7.5 mg Oral QHS    predniSONE (DELTASONE) tablet 20 mg  20 mg Oral BID WITH MEALS    hydrALAZINE (APRESOLINE) tablet 25 mg  25 mg Oral TID    isosorbide dinitrate (ISORDIL) tablet 20 mg  20 mg Oral TID    heparin (porcine) 1,000 unit/mL injection 3,600 Units  3,600 Units Hemodialysis Q TU, TH & SAT    hydrALAZINE (APRESOLINE) 20 mg/mL injection 10 mg  10 mg IntraVENous Q6H PRN    amLODIPine (NORVASC) tablet 10 mg  10 mg Oral DAILY    carvediloL (COREG) tablet 6.25 mg  6.25 mg Oral BID WITH MEALS    hydrOXYzine pamoate (VISTARIL) capsule 50 mg  50 mg Oral Q6H PRN    DOBUTamine (DOBUTREX) 500 mg/250 mL (2,000 mcg/mL) infusion  5 mcg/kg/min IntraVENous CONTINUOUS    NOREPINephrine (LEVOPHED) 16,000 mcg in dextrose 5% 250 mL infusion  2-16 mcg/min IntraVENous TITRATE    insulin lispro (HUMALOG) injection   SubCUTAneous AC&HS    glucose chewable tablet 16 g  4 Tab Oral PRN    dextrose (D50W) injection syrg 12.5-25 g  25-50 mL IntraVENous PRN    glucagon (GLUCAGEN) injection 1 mg  1 mg IntraMUSCular PRN    azaTHIOprine (IMURAN) tablet 25 mg  25 mg Oral DAILY    propofol (DIPRIVAN) 10 mg/mL infusion  0-50 mcg/kg/min IntraVENous TITRATE    metoprolol (LOPRESSOR) injection 5 mg  5 mg IntraVENous Q6H PRN    vitamin B complex capsule 1 Cap  1 Cap Oral DAILY    pantoprazole (PROTONIX) tablet 40 mg  40 mg Oral DAILY    acetaminophen (TYLENOL) tablet 650 mg  650 mg Oral Q6H PRN    Or    acetaminophen (TYLENOL) suppository 650 mg  650 mg Rectal Q6H PRN    polyethylene glycol (MIRALAX) packet 17 g  17 g Oral DAILY PRN    ondansetron (ZOFRAN ODT) tablet 4 mg  4 mg Oral Q8H PRN    Or    ondansetron (ZOFRAN) injection 4 mg  4 mg IntraVENous Q6H PRN    [Held by provider] piperacillin-tazobactam (ZOSYN) 3.375 g in 0.9% sodium chloride (MBP/ADV) 100 mL MBP  3.375 g IntraVENous Q12H           Objective:      Physical Exam:  Last 24hrs VS reviewed since prior progress note.  Most recent are:    Visit Vitals  BP (!) 153/90 (BP 1 Location: Left upper arm, BP Patient Position: At rest)   Pulse 65   Temp 98.1 °F (36.7 °C)   Resp 20   Ht 5' 2.99\" (1.6 m)   Wt 48.8 kg (107 lb 9.4 oz)   SpO2 99%   BMI 19.06 kg/m²     No intake or output data in the 24 hours ending 03/07/21 1112     General Appearance: Well developed, in no acute respiratory distress. Chest: Lungs clear to auscultation bilaterally. Cardiovascular: JVP is not elevated, PMI is not attempted, normal intensity S1 and S2, without S3. Abdomen: Soft, non-tender, bowel sounds are active. Extremities: No edema bilaterally. Data Review    Telemetry: Sinus rhythm without ventricular ectopy    EKG:   [x]  No new EKG for review    Lab Data Personally Reviewed:    Recent Labs     03/06/21  0714 03/05/21  1413   WBC 5.8 6.6   HGB 10.5* 11.7   HCT 32.6* 36.6    213     No results for input(s): INR, PTP, APTT, INREXT, INREXT in the last 72 hours. Recent Labs     03/06/21  0714 03/05/21  1413   * 129*   K 4.6 4.3   CL 93* 92*   CO2 23 24   BUN 78* 60*   CREA 7.76* 6.76*   * 147*   CA 8.4* 8.6     No results for input(s): CPK, CKNDX, TROIQ in the last 72 hours. No lab exists for component: CPKMB  No results found for: CHOL, CHOLX, CHLST, CHOLV, HDL, HDLP, LDL, LDLC, DLDLP, TGLX, TRIGL, TRIGP, CHHD, CHHDX    Recent Labs     03/06/21  0714 03/05/21  1413   * 92   TP 5.8* 6.4   ALB 3.1* 3.3*   GLOB 2.7 3.1     No results for input(s): PH, PCO2, PO2 in the last 72 hours. Assessment/Plan:   1. No acute cardiovascular events overnight. Remains in sinus rhythm. 2.  Hgb/hct stable, Bun/creat trending upward. 3.  Vital signs are hemodynamically stable  4. Continue current cardiovascular medications including amlodipine, carvedilol, hydralazine, and isosorbide  5. LifeVest at discharge  6.   The patient is to follow-up with Mary Grace Sparks MD within 1 to 2 weeks after discharge for continued cardiovascular care     Cassandra Smith NP

## 2021-03-07 NOTE — PROGRESS NOTES
Bedside and Verbal shift change report given to ROGER Gaviria (oncoming nurse) by Yoko Gibson RN (offgoing nurse). Report included the following information SBAR, Recent Results and Cardiac Rhythm NSR.

## 2021-03-07 NOTE — DISCHARGE INSTRUCTIONS
Patient Education   Patient Education        Learning About a Wearable Cardioverter-Defibrillator (WCD)  What is it? A wearable cardioverter-defibrillator (WCD) is a vest that has a defibrillator built into it. A defibrillator is a device that fixes serious changes in your heartbeat. The device is always checking your heart rate and rhythm. If it detects a life-threatening, rapid heart rhythm, it sends an electric shock to the heart. This can restore a normal rhythm. A WCD helps control abnormal heart rhythms. You aren't likely to get a shock from the [address], just as you aren't likely to use the air bag on a car. But it can save your life if it's needed. Why is a WCD used? You might get a WCD if you can't have an ICD (implantable cardioverter-defibrillator) or while you are waiting to have one put in. A WCD may also be used if you might have a high risk of sudden cardiac death for a short time. How is it used? You may wear the WCD for about 2 to 6 weeks or longer. You will be measured so your vest will be the right size. The vest is worn under your clothes. It has electrodes and wires that lie against your skin. You wear a monitor around your waist or on a strap over your shoulder. The WCD should be worn all the time except when you bathe. You can do your normal activities while you wear it. Your doctor will show you how the [address] works and how to take care of it. Be sure to pay attention to alert sounds and messages on the monitor. You need to follow the monitor's instructions exactly. Lazara Cristina also get instructions for what to do after a shock. What does it feel like? The vest may feel uncomfortable, especially at first when you're not used to it. The shock delivered by the defibrillator may be painful. Follow-up care is a key part of your treatment and safety. Be sure to make and go to all appointments, and call your doctor if you are having problems.  It's also a good idea to know your test results and keep a list of the medicines you take. Where can you learn more? Go to http://www.gray.com/  Enter W135 in the search box to learn more about \"Learning About a Wearable Cardioverter-Defibrillator (WCD). \"  Current as of: December 16, 2019               Content Version: 12.6  © 8688-3698 [employer]. Care instructions adapted under license by [employer] (which disclaims liability or warranty for this information). If you have questions about a medical condition or this instruction, always ask your healthcare professional. [address] any warranty or liability for your use of this information. Heart Failure: Care Instructions  Your Care Instructions     Heart failure occurs when your heart does not pump as much blood as the body needs. Failure does not mean that the heart has stopped pumping but rather that it is not pumping as well as it should. Over time, this causes fluid buildup in your lungs and other parts of your body. Fluid buildup can cause shortness of breath, fatigue, swollen ankles, and other problems. By taking medicines regularly, reducing sodium (salt) in your diet, checking your weight every day, and making lifestyle changes, you can feel better and live longer. Follow-up care is a key part of your treatment and safety. Be sure to make and go to all appointments, and call your doctor if you are having problems. It's also a good idea to know your test results and keep a list of the medicines you take. How can you care for yourself at home? Medicines    · Be safe with medicines. Take your medicines exactly as prescribed.  Call your doctor if you think you are having a problem with your medicine.     · Do not take any vitamins, over-the-counter medicine, or herbal products without talking to your doctor first. Lilia Canales not take ibuprofen (Advil or Motrin) and naproxen (Aleve) without talking to your doctor first. They could make your heart failure worse.     · You may take some of the following medicine. ? Angiotensin-converting enzyme inhibitors (ACEIs) or angiotensin II receptor blockers (ARBs) reduce the heart's workload, lower blood pressure, and reduce swelling. Taking an ACEI or ARB may lower your chance of needing to be hospitalized. ? Beta-blockers can slow heart rate, decrease blood pressure, and improve your condition. Taking a beta-blocker may lower your chance of needing to be hospitalized. ? Diuretics, also called water pills, reduce swelling. You will get more details on the specific medicines your doctor prescribes. Diet    · Your doctor may suggest that you limit sodium. Your doctor can tell you how much sodium is right for you. An example is less than 3,000 mg a day. This includes all the salt you eat in cooking or in packaged foods. People get most of their sodium from processed foods. Fast food and restaurant meals also tend to be very high in sodium.     · Ask your doctor how much liquid you can drink each day. You may have to limit liquids. Weight    · Weigh yourself without clothing at the same time each day. Record your weight. Call your doctor if you have a sudden weight gain, such as more than 2 to 3 pounds in a day or 5 pounds in a week. (Your doctor may suggest a different range of weight gain.) A sudden weight gain may mean that your heart failure is getting worse. Activity level    · Start light exercise (if your doctor says it is okay). Even if you can only do a small amount, exercise will help you get stronger, have more energy, and manage your weight and your stress. Walking is an easy way to get exercise. Start out by walking a little more than you did before. Bit by bit, increase the amount you walk.     · When you exercise, watch for signs that your heart is working too hard. You are pushing yourself too hard if you cannot talk while you are exercising.  If you become short of breath or dizzy or have chest pain, stop, sit down, and rest.     · If you feel \"wiped out\" the day after you exercise, walk slower or for a shorter distance until you can work up to a better pace.     · Get enough rest at night. Sleeping with 1 or 2 pillows under your upper body and head may help you breathe easier. Lifestyle changes    · Do not smoke. Smoking can make a heart condition worse. If you need help quitting, talk to your doctor about stop-smoking programs and medicines. These can increase your chances of quitting for good. Quitting smoking may be the most important step you can take to protect your heart.     · Limit alcohol to 2 drinks a day for men and 1 drink a day for women. Too much alcohol can cause health problems.     · Avoid getting sick from colds and the flu. Get a pneumococcal vaccine shot. If you have had one before, ask your doctor whether you need another dose. Get a flu shot each year. If you must be around people with colds or the flu, wash your hands often. When should you call for help? Call 911 if you have symptoms of sudden heart failure such as:    · You have severe trouble breathing.     · You cough up pink, foamy mucus.     · You have a new irregular or rapid heartbeat. Call your doctor now or seek immediate medical care if:    · You have new or increased shortness of breath.     · You are dizzy or lightheaded, or you feel like you may faint.     · You have sudden weight gain, such as more than 2 to 3 pounds in a day or 5 pounds in a week. (Your doctor may suggest a different range of weight gain.)     · You have increased swelling in your legs, ankles, or feet.     · You are suddenly so tired or weak that you cannot do your usual activities. Watch closely for changes in your health, and be sure to contact your doctor if you develop new symptoms. Where can you learn more?   Go to http://www.gray.com/  Enter P066 in the search box to learn more about \"Heart Failure: Care Instructions. \"  Current as of: December 16, 2019               Content Version: 12.6  © 1792-6650 [employer]. Care instructions adapted under license by [employer] (which disclaims liability or warranty for this information). If you have questions about a medical condition or this instruction, always ask your healthcare professional. [place] [address] any warranty or liability for your use of this information.

## 2021-03-07 NOTE — PROGRESS NOTES
Progress Note    Patient: Parmjit Fletcher MRN: [id number]  SSN: [id number]    YOB: 1950  Age: 72 y.o. Sex: female      Admit Date: 2/26/2021    LOS: 9 days     Subjective:   70 years of acute on positive to failure, Goodpasture's syndrome, cardiomyopathy severe EF of 17% fluid overload end-stage renal disease      Objective:     Vitals:    03/07/21 0535 03/07/21 0726 03/07/21 1234 03/07/21 1548   BP: (!) 146/86 (!) 153/90 (!) 150/84 123/74   Pulse: 70 65 73 83   Resp: 18 20 18 22   Temp: 97.6 °F (36.4 °C) 98.1 °F (36.7 °C) 98.1 °F (36.7 °C) 98 °F (36.7 °C)   TempSrc:       SpO2: 99%   99%   Weight:       Height:            Intake and Output:  Current Shift: No intake/output data recorded. Last three shifts: 03/05 1901 - 03/07 0700  In: -   Out: 2500     Physical Exam:   General:  Alert, cooperative, no distress, appears stated age. Eyes:  Conjunctivae/corneas clear. PERRL, EOMs intact. Fundi benign   Ears:  Normal TMs and external ear canals both ears. Nose: Nares normal. Septum midline. Mucosa normal. No drainage or sinus tenderness. Mouth/Throat: Lips, mucosa, and tongue normal. Teeth and gums normal.   Neck: Supple, symmetrical, trachea midline, no adenopathy, thyroid: no enlargment/tenderness/nodules, no carotid bruit and no JVD. Back:   Symmetric, no curvature. ROM normal. No CVA tenderness. Lungs:   Clear to auscultation bilaterally. Heart:  Regular rate and rhythm, S1, S2 normal, no murmur, click, rub or gallop. Abdomen:   Soft, non-tender. Bowel sounds normal. No masses,  No organomegaly. Extremities: Extremities normal, atraumatic, no cyanosis or edema. Pulses: 2+ and symmetric all extremities. Skin: Skin color, texture, turgor normal. No rashes or lesions   Lymph nodes: Cervical, supraclavicular, and axillary nodes normal.   Neurologic: CNII-XII intact. Normal strength, sensation and reflexes throughout. Lab/Data Review:   All lab results for the last 24 hours reviewed.      Recent Results (from the past 24 hour(s))   GLUCOSE, POC    Collection Time: 03/06/21  9:39 PM   Result Value Ref Range    Glucose (POC) 140 (H) 65 - 100 mg/dL    Performed by Marco VERDE [place])    GLUCOSE, POC    Collection Time: 03/07/21  7:31 AM   Result Value Ref Range    Glucose (POC) 141 (H) 65 - 100 mg/dL    Performed by Anil 30, POC    Collection Time: 03/07/21 12:30 PM   Result Value Ref Range    Glucose (POC) 98 65 - 100 mg/dL    Performed by Anil 30, POC    Collection Time: 03/07/21  3:55 PM   Result Value Ref Range    Glucose (POC) 104 (H) 65 - 100 mg/dL    Performed by Alvin Mayorga          Assessment:     Active Problems:    PNA (pneumonia) (2/26/2021)      SOB (shortness of breath) (2/26/2021)    Cardiomyopathy  Acute hypoxic respiratory failure  Fluid overload  End-stage renal disease    Plan:     Hemodialysis    Signed By: Heidi Madrigal MD     March 7, 2021

## 2021-03-08 VITALS
BODY MASS INDEX: 19.06 KG/M2 | SYSTOLIC BLOOD PRESSURE: 160 MMHG | HEIGHT: 63 IN | TEMPERATURE: 97.7 F | DIASTOLIC BLOOD PRESSURE: 96 MMHG | HEART RATE: 88 BPM | OXYGEN SATURATION: 99 % | RESPIRATION RATE: 18 BRPM | WEIGHT: 107.58 LBS

## 2021-03-08 LAB
GLUCOSE BLD STRIP.AUTO-MCNC: 108 MG/DL (ref 65–100)
GLUCOSE BLD STRIP.AUTO-MCNC: 94 MG/DL (ref 65–100)
PERFORMED BY, TECHID: ABNORMAL
PERFORMED BY, TECHID: NORMAL

## 2021-03-08 PROCEDURE — 74011250637 HC RX REV CODE- 250/637: Performed by: INTERNAL MEDICINE

## 2021-03-08 PROCEDURE — 82962 GLUCOSE BLOOD TEST: CPT

## 2021-03-08 PROCEDURE — 97530 THERAPEUTIC ACTIVITIES: CPT

## 2021-03-08 PROCEDURE — 74011636637 HC RX REV CODE- 636/637: Performed by: INTERNAL MEDICINE

## 2021-03-08 PROCEDURE — 74011250637 HC RX REV CODE- 250/637: Performed by: FAMILY MEDICINE

## 2021-03-08 PROCEDURE — 74011250636 HC RX REV CODE- 250/636: Performed by: LEGAL MEDICINE

## 2021-03-08 RX ORDER — MIRTAZAPINE 7.5 MG/1
7.5 TABLET, FILM COATED ORAL
Qty: 30 TAB | Refills: 0 | Status: SHIPPED | OUTPATIENT
Start: 2021-03-08

## 2021-03-08 RX ORDER — HYDRALAZINE HYDROCHLORIDE 25 MG/1
25 TABLET, FILM COATED ORAL 3 TIMES DAILY
Qty: 90 TAB | Refills: 0 | Status: SHIPPED | OUTPATIENT
Start: 2021-03-08

## 2021-03-08 RX ORDER — PREDNISONE 20 MG/1
20 TABLET ORAL 2 TIMES DAILY WITH MEALS
Qty: 10 TAB | Refills: 0 | Status: SHIPPED | OUTPATIENT
Start: 2021-03-08

## 2021-03-08 RX ORDER — AMLODIPINE BESYLATE 10 MG/1
10 TABLET ORAL DAILY
Qty: 30 TAB | Refills: 0 | Status: SHIPPED | OUTPATIENT
Start: 2021-03-09

## 2021-03-08 RX ORDER — ISOSORBIDE DINITRATE 20 MG/1
20 TABLET ORAL 3 TIMES DAILY
Qty: 90 TAB | Refills: 0 | Status: SHIPPED | OUTPATIENT
Start: 2021-03-08

## 2021-03-08 RX ADMIN — HYDRALAZINE HYDROCHLORIDE 25 MG: 25 TABLET ORAL at 08:35

## 2021-03-08 RX ADMIN — AZATHIOPRINE 25 MG: 50 TABLET ORAL at 08:35

## 2021-03-08 RX ADMIN — Medication 1 CAPSULE: at 08:35

## 2021-03-08 RX ADMIN — SEVELAMER CARBONATE 1600 MG: 800 TABLET, FILM COATED ORAL at 08:35

## 2021-03-08 RX ADMIN — ISOSORBIDE DINITRATE 20 MG: 20 TABLET ORAL at 15:23

## 2021-03-08 RX ADMIN — AMLODIPINE BESYLATE 10 MG: 5 TABLET ORAL at 08:35

## 2021-03-08 RX ADMIN — CARVEDILOL 6.25 MG: 3.12 TABLET, FILM COATED ORAL at 08:35

## 2021-03-08 RX ADMIN — PREDNISONE 20 MG: 20 TABLET ORAL at 08:35

## 2021-03-08 RX ADMIN — ISOSORBIDE DINITRATE 20 MG: 20 TABLET ORAL at 08:35

## 2021-03-08 RX ADMIN — PANTOPRAZOLE SODIUM 40 MG: 40 TABLET, DELAYED RELEASE ORAL at 08:35

## 2021-03-08 RX ADMIN — HYDRALAZINE HYDROCHLORIDE 25 MG: 25 TABLET ORAL at 15:23

## 2021-03-08 RX ADMIN — SEVELAMER CARBONATE 1600 MG: 800 TABLET, FILM COATED ORAL at 15:23

## 2021-03-08 NOTE — PROGRESS NOTES
Problem: Falls - Risk of  Goal: *Absence of Falls  Description: Document Duke Stratton Fall Risk and appropriate interventions in the flowsheet.   Outcome: Progressing Towards Goal  Note: Fall Risk Interventions:  Mobility Interventions: Bed/chair exit alarm    Mentation Interventions: Increase mobility    Medication Interventions: Patient to call before getting OOB, Teach patient to arise slowly    Elimination Interventions: Call light in reach              Problem: Patient Education: Go to Patient Education Activity  Goal: Patient/Family Education  Outcome: Progressing Towards Goal     Problem: Ventilator Management  Goal: *Patient maintains clear airway/free of aspiration  Outcome: Progressing Towards Goal  Goal: *Absence of infection signs and symptoms  Outcome: Progressing Towards Goal

## 2021-03-08 NOTE — PROGRESS NOTES
PULMONARY NOTE  [facility]     Name: Radha Chester MRN: [id number]   : 1950 Hospital: [address] [place] [address]   Date: 3/8/2021  Admission date: 2021 Hospital Day: 6         HPI:                  Hospital Problems  Never Reviewed           Codes Class Noted POA     PNA (pneumonia) ICD-10-CM: J18.9  ICD-9-CM: 486   2021 Unknown           SOB (shortness of breath) ICD-10-CM: R06.02  ICD-9-CM: 786.05   2021 Unknown                          [x]? High complexity decision making was performed  [x]? See my orders for details        Subjective/Initial History:      I was asked by Svitlana Stock MD to see Radha Chester  a 72 y.o.  female in consultation      Excerpts from admission 2021 or consult notes as follows:   13-year-old female came in due to shortness of breath and dyspnea significant past medical history of end-stage renal disease on hemodialysis, Goodpasture syndrome anemia of chronic disease hypertension steroid-dependent, she was not able to dialysis at home because of generalized weakness and shortness of breath she has been on home dialysis no fever no chills she was put on 100% nonrebreather mask which has been switched to noninvasive ventilator not she is going for dialysis because of severe hypoxia so pulmonary critical care consult was called for further evaluation. She is a lifetime non-smoker. This morning her BP was 173/89 just before she received her amlodipine. She states she is feeling well this morning and has no complaints.  Denies SOB, chest pain, cough, headache.         No Known Allergies      MAR reviewed and pertinent medications noted or modified as needed          Current Facility-Administered Medications   Medication    mirtazapine (REMERON) tablet 7.5 mg    predniSONE (DELTASONE) tablet 20 mg    hydrALAZINE (APRESOLINE) tablet 25 mg    isosorbide dinitrate (ISORDIL) tablet 20 mg    heparin (porcine) 1,000 unit/mL injection 3,600 Units    hydrALAZINE (APRESOLINE) 20 mg/mL injection 10 mg    amLODIPine (NORVASC) tablet 10 mg    carvediloL (COREG) tablet 6.25 mg    hydrOXYzine pamoate (VISTARIL) capsule 50 mg    DOBUTamine (DOBUTREX) 500 mg/250 mL (2,000 mcg/mL) infusion    NOREPINephrine (LEVOPHED) 16,000 mcg in dextrose 5% 250 mL infusion    insulin lispro (HUMALOG) injection    glucose chewable tablet 16 g    dextrose (D50W) injection syrg 12.5-25 g    glucagon (GLUCAGEN) injection 1 mg    azaTHIOprine (IMURAN) tablet 25 mg    propofol (DIPRIVAN) 10 mg/mL infusion    metoprolol (LOPRESSOR) injection 5 mg    vitamin B complex capsule 1 Cap    pantoprazole (PROTONIX) tablet 40 mg    sevelamer carbonate (RENVELA) tab 800 mg    acetaminophen (TYLENOL) tablet 650 mg     Or    acetaminophen (TYLENOL) suppository 650 mg    polyethylene glycol (MIRALAX) packet 17 g    ondansetron (ZOFRAN ODT) tablet 4 mg     Or    ondansetron (ZOFRAN) injection 4 mg    [Held by provider] piperacillin-tazobactam (ZOSYN) 3.375 g in 0.9% sodium chloride (MBP/ADV) 100 mL MBP      Patient PCP: None  PMH:  has a past medical history of Chronic kidney disease and Heart failure ([facility] [address] PSH:   has no past surgical history on file. FHX: family history is not on file. SHX:  reports that she has never smoked. She has never used smokeless tobacco. She reports previous alcohol use. She reports previous drug use.      ROS:  Unable to obtain as patient was lethargic and severely short of breath        Objective:      Vital Signs: Telemetry:    normal sinus rhythm Intake/Output:   Visit Vitals  BP (!) 159/89   Pulse 72   Temp 98.3 °F (36.8 °C) (Oral)   Resp 20   Ht 5' 2.99\" (1.6 m)   Wt 48.8 kg (107 lb 9.4 oz)   SpO2 95%   BMI 19.06 kg/m²         Temp (24hrs), Av.1 °F (36.7 °C), Min:97.4 °F (36.3 °C), Max:98.6 °F (37 °C)         O2 Device: Room air O2 Flow Rate (L/min): 0 l/min              Wt Readings from Last 4 Encounters:   21 48.8 kg (107 lb 9.4 oz)           No intake or output data in the 24 hours ending 03/06/21 0934     Last shift:      No intake/output data recorded. Last 3 shifts: No intake/output data recorded.         Physical Exam:   Patient is intubated on ventilator  Physical Exam   Constitutional: She appears distressed. HENT:   Head: Normocephalic and atraumatic. Eyes: Pupils are equal, round, and reactive to light. EOM are normal.   Neck: Normal range of motion. Neck supple. Cardiovascular: Normal rate and regular rhythm. Pulmonary/Chest: She is in respiratory distress. She has rales. Abdominal: Soft. Musculoskeletal: Normal range of motion. Neurological: She is alert. Skin: Skin is warm.         Labs:           Recent Labs     03/06/21 0714 03/05/21 1413 03/04/21  0403   WBC 5.8 6.6 5.7   HGB 10.5* 11.7 11.1*    213 181            Recent Labs     03/06/21 0714 03/05/21 1413 03/04/21  0403   * 129* 131*   K 4.6 4.3 4.5   CL 93* 92* 94*   CO2 23 24 23   * 147* 112*   BUN 78* 60* 84*   CREA 7.76* 6.76* 8.01*   CA 8.4* 8.6 8.2*   ALB 3.1* 3.3* 2.9*   ALT 46 58 72      No results for input(s): PH, PCO2, PO2, HCO3, FIO2 in the last 72 hours. No results for input(s): CPK, CKNDX, TROIQ in the last 72 hours.     No lab exists for component: CPKMB  No results found for: BNPP, BNP         Lab Results   Component Value Date/Time     Culture result: No growth 2 days 02/28/2021 09:30 AM     Culture result: No growth 5 days 02/27/2021 01:58 AM     Culture result: No growth 5 days 02/27/2021 01:45 AM   No results found for: TSH, TSHEXT, TSHEXT     Imaging:     CXR Results  (Last 48 hours)     None               Results from Hospital Encounter encounter on 02/26/21   XR CHEST PORT     Narrative Chest single view.     Comparison single view chest March 1, 2021     ET tube and NG tube have been removed. Stable right-sided permacath. Unchanged  appearance for the lungs.  No gross interstitial or alveolar pulmonary edema. Cardiac and mediastinal structures unchanged. No pneumothorax or sizable pleural  effusion. XR CHEST PORT     Narrative Portable chest, 0318 hours, compared with 2/28/2021.        Impression Stable appearance of endotracheal tube, right central dialysis  catheter, gastric tube, and temporary right ventricular pacing lead. Bilateral  pulmonary edema and small pleural effusions, consistent with clinical CHF,  subjectively slightly improved. No pneumothorax or other acute change. XR CHEST PORT     Narrative Upright radiograph chest 11:12 AM compared with 3/27/2021 at 7:19 AM.     INDICATION: Tachypnea, pneumonia.     Right IJ central line remains in place. Heart size is stable. Extensive  bilateral airspace disease, right greater than left, in a predominantly central  distribution shows minimal improvement compared to the previous study. Defibrillator pads project over the right upper chest and left lower chest. No  pneumothorax. Definite displaced fractures. Next        Impression Persistent bilateral airspace disease showing slight improvement  compared to earlier study.           Results from Hospital Encounter encounter on 02/26/21   CTA CHEST W OR W WO CONT     Narrative CT dose reduction was achieved through use of a standardized protocol tailored  for this examination and automatic exposure control for dose modulation.      Contrast study maximum intensity projections     There is a extensive diffuse lung disease. Dense consolidation is seen  throughout much of the right lower lobe and there is mild variable groundglass  opacification and small foci of dense consolidation scattered elsewhere  throughout much of each lung, right greater than left, with subpleural sparing,  mostly on the LEFT. Right middle lobe is disproportionately less involved, as is  the anterior left upper lobe.  Central airways are open     Pulmonary arteries are densely opacified and show no filling defect or  distortion. Aorta shows normal dimensions, without dissection. No mediastinal or  hilar adenopathy. Small moderate symmetric pleural effusions. No pericardial  effusion. No significant abdominal finding. Body wall edema        Impression The findings may be a combination of extensive pneumonia,  pneumonitis and edema. No evidence of PE            IMPRESSION:   1. Acute hypoxic respiratory failure  2. History of Goodpasture syndrome  3. Severe cardiomyopathy ejection fraction about 17%  4. Fluid overload pulmonary edema  5. End-stage renal disease on hemodialysis  6. Neutropenia and hyperkalemia  7. Anemia  8. Bradycardia resolved  7. Body mass index is 19.06 kg/m².       RECOMMENDATIONS/PLAN:      1. Extubated on 3/1 patient is on room air now  2. CAT scan of the chest shows pulmonary edema CHF with prior history of Goodpasture syndrome. 3. Agree with dialysis   4. Patient on prednisone  5.  Patient is afebrile normal white count  6. 2D echo shows ejection fraction of 17% off aminophylline and dobutamine

## 2021-03-08 NOTE — PROGRESS NOTES
*ATTENTION:  This note has been created by a medical student for educational purposes only. Please do not refer to the content of this note for clinical decision-making, billing, or other purposes. Please see attending physicians note to obtain clinical information on this patient. *           General Daily Progress Note          Patient Name:   Millicent Pires       YOB: 1950       Age:  72 y.o. Admit Date: 2/26/2021      Subjective:     Patient seen in her room, sitting in chair eating breakfast. She states that she is feeling well. Doing well after extubation on 3/1, talking, eating, alert, and awake. She was last dialyzed on Saturday. She denies chest pain, shortness of breath, and weakness. /89  P 67    Hep B and C panels negative     Echo on 2/28 - LVEF is 17%. Normal cavity size and wall thickness. Severely reduced systolic function. Mild (grade 1) left ventricular diastolic dysfunction.     Objective:     Visit Vitals  BP (!) 173/89   Pulse 67   Temp 98 °F (36.7 °C)   Resp 20   Ht 5' 2.99\" (1.6 m)   Wt 48.8 kg (107 lb 9.4 oz)   SpO2 99%   BMI 19.06 kg/m²        Recent Results (from the past 24 hour(s))   GLUCOSE, POC    Collection Time: 03/07/21 12:30 PM   Result Value Ref Range    Glucose (POC) 98 65 - 100 mg/dL    Performed by [address], POC    Collection Time: 03/07/21  3:55 PM   Result Value Ref Range    Glucose (POC) 104 (H) 65 - 100 mg/dL    Performed by [address], POC    Collection Time: 03/07/21 10:17 PM   Result Value Ref Range    Glucose (POC) 195 (H) 65 - 100 mg/dL    Performed by Mariann Abebe    GLUCOSE, POC    Collection Time: 03/08/21  7:51 AM   Result Value Ref Range    Glucose (POC) 94 65 - 100 mg/dL    Performed by Kimmie Rosario      [unfilled]      Review of Systems  Patient is alert awake denies any chest pain or shortness of breath nausea no vomiting      Physical Exam:      Constitutional:  alert awake answer all questions HENT:   Head: Normocephalic and atraumatic. Eyes: Pupils are equal, round, and reactive to light. EOM are normal.   Cardiovascular: Normal rate, regular rhythm and normal heart sounds. Pulmonary/Chest: Clear to auscultation   abdominal: Soft. Bowel sounds are normal. There is no abdominal tenderness. There is no rebound and no guarding. Musculoskeletal: Normal range of motion. Neurological: alert and oriented    XR CHEST PORT   Final Result      XR CHEST PORT   Final Result   Stable appearance of endotracheal tube, right central dialysis   catheter, gastric tube, and temporary right ventricular pacing lead. Bilateral   pulmonary edema and small pleural effusions, consistent with clinical CHF,   subjectively slightly improved. No pneumothorax or other acute change. XR CHEST PORT   Final Result   Bilateral airspace disease/edema showing slight improvement compared   to the previous study. XR CHEST PORT   Final Result   Persistent bilateral airspace disease showing slight improvement   compared to earlier study. XR CHEST PORT   Final Result   Bilateral central airspace disease/edema right greater than left   showing slight worsening on the right. CTA CHEST W OR W WO CONT   Final Result   The findings may be a combination of extensive pneumonia,   pneumonitis and edema.  No evidence of PE      XR CHEST SNGL V   Final Result           Recent Results (from the past 24 hour(s))   GLUCOSE, POC    Collection Time: 03/07/21 12:30 PM   Result Value Ref Range    Glucose (POC) 98 65 - 100 mg/dL    Performed by [address], POC    Collection Time: 03/07/21  3:55 PM   Result Value Ref Range    Glucose (POC) 104 (H) 65 - 100 mg/dL    Performed by [address], POC    Collection Time: 03/07/21 10:17 PM   Result Value Ref Range    Glucose (POC) 195 (H) 65 - 100 mg/dL    Performed by Ariane Holland    GLUCOSE, POC    Collection Time: 03/08/21  7:51 AM   Result Value Ref Range    Glucose (POC) 94 65 - 100 mg/dL    Performed by Zachariah Siu        Results     Procedure Component Value Units Date/Time    CULTURE, RESPIRATORY/SPUTUM/BRONCH Hilda Frederick [[id number]] Collected: 02/28/21 0930    Order Status: Completed Specimen: Sputum Updated: 03/02/21 1233     Special Requests: No Special Requests        GRAM STAIN Occasional WBCs seen               Rare Epithelial cells seen            No organisms seen        Culture result: No growth 2 days       CULTURE, BLOOD #1 [[id number]] Collected: 02/27/21 0158    Order Status: Completed Specimen: Blood Updated: 03/06/21 1535     Special Requests: No Special Requests        Culture result: No growth 6 days       CULTURE, BLOOD #2 [[id number]] Collected: 02/27/21 0145    Order Status: Completed Specimen: Blood Updated: 03/06/21 1535     Special Requests: No Special Requests        Culture result: No growth 6 days       CULTURE, BLOOD, PAIRED [[id number]] Collected: 02/26/21 1430    Order Status: Completed Specimen: Blood Updated: 03/05/21 0719     Special Requests: No Special Requests        Culture result: No growth 6 days       CULTURE, BLOOD [[id number]] Collected: 02/26/21 1430    Order Status: Canceled Specimen: Blood     COVID-19 RAPID TEST [[id number]] Collected: 02/26/21 1336    Order Status: Completed Specimen: Nasopharyngeal Updated: 02/26/21 1409     Specimen source Nasopharyngeal        COVID-19 rapid test Not Detected        Comment: Rapid Abbott ID Now   Rapid NAAT:  The specimen is NEGATIVE for SARS-CoV-2, the novel coronavirus associated with COVID-19. Negative results should be treated as presumptive and, if inconsistent with clinical signs and symptoms or necessary for patient management, should be tested with an alternative molecular assay. Negative results do not preclude SARS-CoV-2 infection and should not be used as the sole basis for patient management decisions.    This test has been authorized by the FDA under   an Emergency Use Authorization (EUA) for use by authorized laboratories. Fact sheet for Healthcare Providers: ConventionUpdate.co.nz Fact sheet for Patients: ConventionUpdate.co.nz   Methodology: Isothermal Nucleic Acid Amplification                Labs:     Recent Labs     03/06/21 0714 03/05/21  1413   WBC 5.8 6.6   HGB 10.5* 11.7   HCT 32.6* 36.6    213     Recent Labs     03/06/21 0714 03/05/21  1413   * 129*   K 4.6 4.3   CL 93* 92*   CO2 23 24   BUN 78* 60*   CREA 7.76* 6.76*   * 147*   CA 8.4* 8.6     Recent Labs     03/06/21 0714 03/05/21  1413   ALT 46 58   * 92   TBILI 0.3 0.4   TP 5.8* 6.4   ALB 3.1* 3.3*   GLOB 2.7 3.1     No results for input(s): INR, PTP, APTT, INREXT, INREXT in the last 72 hours. No results for input(s): FE, TIBC, PSAT, FERR in the last 72 hours. No results found for: FOL, RBCF   No results for input(s): PH, PCO2, PO2 in the last 72 hours. No results for input(s): CPK, CKNDX, TROIQ in the last 72 hours.     No lab exists for component: CPKMB  No results found for: CHOL, CHOLX, CHLST, CHOLV, HDL, HDLP, LDL, LDLC, DLDLP, TGLX, TRIGL, TRIGP, CHHD, CHHDX  Lab Results   Component Value Date/Time    Glucose (POC) 94 03/08/2021 07:51 AM    Glucose (POC) 195 (H) 03/07/2021 10:17 PM    Glucose (POC) 104 (H) 03/07/2021 03:55 PM    Glucose (POC) 98 03/07/2021 12:30 PM    Glucose (POC) 141 (H) 03/07/2021 07:31 AM     No results found for: COLOR, APPRN, SPGRU, REFSG, YESY, PROTU, GLUCU, KETU, BILU, UROU, CARTER, LEUKU, GLUKE, EPSU, BACTU, WBCU, RBCU, CASTS, UCRY      Assessment:     Acute hypoxemic respiratory failure extubated on 3/1  severe symptomatic bradycardia on Toprol urinary sphincter  End-stage renal disease on hemodialysis at home  End-stage cardiomyopathy with ejection fraction of 17%  Hypertensive  Goodpasture's syndrome  Fluid overload  Hyperkalemia  Anemia of chronic disease      Plan:   D/C Dobutamine drip    Patient on Norvasc 10 mg daily  On Imuran 25 mg daily  Coreg 6.25 twice daily  Hydralazine 25 mg 3 times a day  On isosorbide 20 mg 3 times a day  Remeron 7.5 mg nightly   On prednisone 20 mg BID with meals  Protonix 40 mg daily      Follow-up with nephrology cardiology PT/OT and pulmonologist    Cardiology arranging LifeVest placement prior to discharge and follow up in 1-2 weeks      sent referrals for [facility] care - patient accepted at [address] on in center hemodialysis vs home dialysis       Current Facility-Administered Medications:     sevelamer carbonate (RENVELA) tab 1,600 mg, 1,600 mg, Oral, TID WITH MEALS, Shaheen MORALES MD, 1,600 mg at 03/07/21 1716    mirtazapine (REMERON) tablet 7.5 mg, 7.5 mg, Oral, QHS, [facility], Jeff VYAS MD, 7.5 mg at 03/07/21 2239    predniSONE (DELTASONE) tablet 20 mg, 20 mg, Oral, BID WITH MEALS, Nasir Rajput MD, 20 mg at 03/07/21 1716    hydrALAZINE (APRESOLINE) tablet 25 mg, 25 mg, Oral, TID, Aurelia Jett MD, 25 mg at 03/07/21 2239    isosorbide dinitrate (ISORDIL) tablet 20 mg, 20 mg, Oral, TID, Aurelia Jett MD, 20 mg at 03/07/21 2239    heparin (porcine) 1,000 unit/mL injection 3,600 Units, 3,600 Units, Hemodialysis, Q TU, TH & SAT, Grisel Dent MD, 3,600 Units at 03/04/21 0827    hydrALAZINE (APRESOLINE) 20 mg/mL injection 10 mg, 10 mg, IntraVENous, Q6H PRN, Aurelia Jett MD    amLODIPine (NORVASC) tablet 10 mg, 10 mg, Oral, DAILY, Aurelia Jett MD, 10 mg at 03/07/21 0842    carvediloL (COREG) tablet 6.25 mg, 6.25 mg, Oral, BID WITH MEALS, Aurelia Jett MD, 6.25 mg at 03/07/21 1716    hydrOXYzine pamoate (VISTARIL) capsule 50 mg, 50 mg, Oral, Q6H PRN, Es El MD, 50 mg at 02/27/21 0528    NOREPINephrine (LEVOPHED) 16,000 mcg in dextrose 5% 250 mL infusion, 2-16 mcg/min, IntraVENous, TITRATE, Nathen George MD    insulin lispro (HUMALOG) injection, , SubCUTAneous, AC&HS, Es El MD, Stopped at 03/07/21 1130    glucose chewable tablet 16 g, 4 Tab, Oral, PRN, Juanjose George MD    dextrose (D50W) injection syrg 12.5-25 g, 25-50 mL, IntraVENous, PRN, Juanjose George MD    glucagon (GLUCAGEN) injection 1 mg, 1 mg, IntraMUSCular, PRN, Juanjose George MD    azaTHIOprine (IMURAN) tablet 25 mg, 25 mg, Oral, DAILY, Griselda Loft, MD, 25 mg at 03/07/21 0842    propofol (DIPRIVAN) 10 mg/mL infusion, 0-50 mcg/kg/min, IntraVENous, TITRATE, Juanjose George MD, Stopped at 03/01/21 0815    metoprolol (LOPRESSOR) injection 5 mg, 5 mg, IntraVENous, Q6H PRN, Deni SIMS MD, 5 mg at 02/27/21 1646    vitamin B complex capsule 1 Cap, 1 Cap, Oral, DAILY, Kim George MD, 1 Cap at 03/07/21 0842    pantoprazole (PROTONIX) tablet 40 mg, 40 mg, Oral, DAILY, Kim George MD, 40 mg at 03/07/21 2648    acetaminophen (TYLENOL) tablet 650 mg, 650 mg, Oral, Q6H PRN **OR** acetaminophen (TYLENOL) suppository 650 mg, 650 mg, Rectal, Q6H PRN, Juanjose George MD    polyethylene glycol (MIRALAX) packet 17 g, 17 g, Oral, DAILY PRN, Juanjose George MD    ondansetron (ZOFRAN ODT) tablet 4 mg, 4 mg, Oral, Q8H PRN **OR** ondansetron (ZOFRAN) injection 4 mg, 4 mg, IntraVENous, Q6H PRN, Kim George MD    [Held by provider] piperacillin-tazobactam (ZOSYN) 3.375 g in 0.9% sodium chloride (MBP/ADV) 100 mL MBP, 3.375 g, IntraVENous, Q12H, Kim George MD, Stopped at 03/04/21 0900

## 2021-03-08 NOTE — PROGRESS NOTES
3/8/2021 12:36 PM      Admit Date: 2/26/2021    Admit Diagnosis: PNA (pneumonia) [J18.9]  SOB (shortness of breath) [R06.02]      Subjective:   She was seen and examined. Sitting up in chair. Apparently being demonstrated LifeVest.  States that she is feeling better. Not short of breath. Review of Systems   All other systems reviewed and are negative. Was done and is negative  Positive for generalized weakness and exercise intolerance  Objective:      Visit Vitals  BP (!) 146/89 (BP Patient Position: Sitting)   Pulse 69   Temp 98 °F (36.7 °C)   Resp 20   Ht 5' 2.99\" (1.6 m)   Wt 48.8 kg (107 lb 9.4 oz)   SpO2 99%   BMI 19.06 kg/m²       Physical Exam:  [place], Well Nourished, No Acute Distress, Alert & Oriented x 3, appropriate mood. Neck- supple, no JVD  CV- regular rate and rhythm no MRG  Lung- clear bilaterally  Abd- soft, nontender, nondistended  Ext- no edema bilaterally.   Skin- warm and dry  Access-right internal jugular tunneled hemodialysis cath  Recent Results (from the past 24 hour(s))   GLUCOSE, POC    Collection Time: 03/07/21  3:55 PM   Result Value Ref Range    Glucose (POC) 104 (H) 65 - 100 mg/dL    Performed by Anil Meyers, POC    Collection Time: 03/07/21 10:17 PM   Result Value Ref Range    Glucose (POC) 195 (H) 65 - 100 mg/dL    Performed by Wilner Liang    GLUCOSE, POC    Collection Time: 03/08/21  7:51 AM   Result Value Ref Range    Glucose (POC) 94 65 - 100 mg/dL    Performed by [address] [place] [address], POC    Collection Time: 03/08/21 11:50 AM   Result Value Ref Range    Glucose (POC) 108 (H) 65 - 100 mg/dL    Performed by Mouna Patton         No intake or output data in the 24 hours ending 03/08/21 1236       Data Review:   Recent Labs     03/06/21  0714   *   K 4.6   BUN 78*   CREA 7.76*   WBC 5.8   HGB 10.5*   HCT 32.6*        @MEDADMIN  Assessment/Plan:     Active Problems:    PNA (pneumonia) (2/26/2021)      SOB (shortness of breath) (2/26/2021)        DIAGNOSES/ IMPRESSION  1. ESRD on hemodialysis  2. Hyponatremia, hypervolemic  3. Congestive heart failure with reduced ejection fraction  4. Valvular heart disease  5. Mild pulmonary hypertension  6. Bradycardia, corrected   DISCUSSION    Not in fluid overload   Still on prednisone 20 twice a day   On Imuran 25 daily   Also on low-dose Remeron at bedtime   Antihypertensives also being maintained   Being discharged on home hemodialysis  PLAN:   We will need close outpatient follow-up with home hemodialysis team   Would need to optimize fluid removal and antihypertensives.  Continue with Renvela at current dose      Thank you  This dictation was done by dragon, computer voice recognition software. Often unanticipated grammatical, syntax, phones and other interpretive errors are inadvertently transcribed. Please excuse errors that have escaped final proofreading.

## 2021-03-08 NOTE — PROGRESS NOTES
PULMONARY NOTE  [facility]     Name: Millicent Pires MRN: [id number]   : 1950 Hospital: [address] [place] [address]   Date: 3/8/2021  Admission date: 2021 Hospital Day: 6         HPI:                  Hospital Problems  Never Reviewed           Codes Class Noted POA     PNA (pneumonia) ICD-10-CM: J18.9  ICD-9-CM: 486   2021 Unknown           SOB (shortness of breath) ICD-10-CM: R06.02  ICD-9-CM: 786.05   2021 Unknown                          [x]? High complexity decision making was performed  [x]? See my orders for details        Subjective/Initial History:      I was asked by Sindy Balbuena MD to see Millicent Pires  a 72 y.o.  female in consultation      Excerpts from admission 2021 or consult notes as follows:   72-year-old female came in due to shortness of breath and dyspnea significant past medical history of end-stage renal disease on hemodialysis, Goodpasture syndrome anemia of chronic disease hypertension steroid-dependent, she was not able to dialysis at home because of generalized weakness and shortness of breath she has been on home dialysis no fever no chills she was put on 100% nonrebreather mask which has been switched to noninvasive ventilator not she is going for dialysis because of severe hypoxia so pulmonary critical care consult was called for further evaluation. She is a lifetime non-smoker. This morning her BP was 173/89 just before she received her amlodipine. She states she is feeling well this morning and has no complaints.  Denies SOB, chest pain, cough, headache.         No Known Allergies      MAR reviewed and pertinent medications noted or modified as needed          Current Facility-Administered Medications   Medication    mirtazapine (REMERON) tablet 7.5 mg    predniSONE (DELTASONE) tablet 20 mg    hydrALAZINE (APRESOLINE) tablet 25 mg    isosorbide dinitrate (ISORDIL) tablet 20 mg    heparin (porcine) 1,000 unit/mL injection 3,600 Units    hydrALAZINE (APRESOLINE) 20 mg/mL injection 10 mg    amLODIPine (NORVASC) tablet 10 mg    carvediloL (COREG) tablet 6.25 mg    hydrOXYzine pamoate (VISTARIL) capsule 50 mg    DOBUTamine (DOBUTREX) 500 mg/250 mL (2,000 mcg/mL) infusion    NOREPINephrine (LEVOPHED) 16,000 mcg in dextrose 5% 250 mL infusion    insulin lispro (HUMALOG) injection    glucose chewable tablet 16 g    dextrose (D50W) injection syrg 12.5-25 g    glucagon (GLUCAGEN) injection 1 mg    azaTHIOprine (IMURAN) tablet 25 mg    propofol (DIPRIVAN) 10 mg/mL infusion    metoprolol (LOPRESSOR) injection 5 mg    vitamin B complex capsule 1 Cap    pantoprazole (PROTONIX) tablet 40 mg    sevelamer carbonate (RENVELA) tab 800 mg    acetaminophen (TYLENOL) tablet 650 mg     Or    acetaminophen (TYLENOL) suppository 650 mg    polyethylene glycol (MIRALAX) packet 17 g    ondansetron (ZOFRAN ODT) tablet 4 mg     Or    ondansetron (ZOFRAN) injection 4 mg    [Held by provider] piperacillin-tazobactam (ZOSYN) 3.375 g in 0.9% sodium chloride (MBP/ADV) 100 mL MBP      Patient PCP: None  PMH:  has a past medical history of Chronic kidney disease and Heart failure ([facility] [address] PSH:   has no past surgical history on file. FHX: family history is not on file. SHX:  reports that she has never smoked. She has never used smokeless tobacco. She reports previous alcohol use. She reports previous drug use.      ROS:  Unable to obtain as patient was lethargic and severely short of breath        Objective:      Vital Signs: Telemetry:    normal sinus rhythm Intake/Output:   Visit Vitals  BP (!) 159/89   Pulse 72   Temp 98.3 °F (36.8 °C) (Oral)   Resp 20   Ht 5' 2.99\" (1.6 m)   Wt 48.8 kg (107 lb 9.4 oz)   SpO2 95%   BMI 19.06 kg/m²         Temp (24hrs), Av.1 °F (36.7 °C), Min:97.4 °F (36.3 °C), Max:98.6 °F (37 °C)         O2 Device: Room air O2 Flow Rate (L/min): 0 l/min              Wt Readings from Last 4 Encounters:   21 48.8 kg (107 lb 9.4 oz)           No intake or output data in the 24 hours ending 03/06/21 0934     Last shift:      No intake/output data recorded. Last 3 shifts: No intake/output data recorded.         Physical Exam:   Patient is intubated on ventilator  Physical Exam   Constitutional: She appears distressed. HENT:   Head: Normocephalic and atraumatic. Eyes: Pupils are equal, round, and reactive to light. EOM are normal.   Neck: Normal range of motion. Neck supple. Cardiovascular: Normal rate and regular rhythm. Pulmonary/Chest: She is in respiratory distress. She has rales. Abdominal: Soft. Musculoskeletal: Normal range of motion. Neurological: She is alert. Skin: Skin is warm.         Labs:           Recent Labs     03/06/21 0714 03/05/21 1413 03/04/21  0403   WBC 5.8 6.6 5.7   HGB 10.5* 11.7 11.1*    213 181            Recent Labs     03/06/21 0714 03/05/21 1413 03/04/21  0403   * 129* 131*   K 4.6 4.3 4.5   CL 93* 92* 94*   CO2 23 24 23   * 147* 112*   BUN 78* 60* 84*   CREA 7.76* 6.76* 8.01*   CA 8.4* 8.6 8.2*   ALB 3.1* 3.3* 2.9*   ALT 46 58 72      No results for input(s): PH, PCO2, PO2, HCO3, FIO2 in the last 72 hours. No results for input(s): CPK, CKNDX, TROIQ in the last 72 hours.     No lab exists for component: CPKMB  No results found for: BNPP, BNP         Lab Results   Component Value Date/Time     Culture result: No growth 2 days 02/28/2021 09:30 AM     Culture result: No growth 5 days 02/27/2021 01:58 AM     Culture result: No growth 5 days 02/27/2021 01:45 AM   No results found for: TSH, TSHEXT, TSHEXT     Imaging:     CXR Results  (Last 48 hours)     None               Results from Hospital Encounter encounter on 02/26/21   XR CHEST PORT     Narrative Chest single view.     Comparison single view chest March 1, 2021     ET tube and NG tube have been removed. Stable right-sided permacath. Unchanged  appearance for the lungs.  No gross interstitial or alveolar pulmonary edema. Cardiac and mediastinal structures unchanged. No pneumothorax or sizable pleural  effusion. XR CHEST PORT     Narrative Portable chest, 0318 hours, compared with 2/28/2021.        Impression Stable appearance of endotracheal tube, right central dialysis  catheter, gastric tube, and temporary right ventricular pacing lead. Bilateral  pulmonary edema and small pleural effusions, consistent with clinical CHF,  subjectively slightly improved. No pneumothorax or other acute change. XR CHEST PORT     Narrative Upright radiograph chest 11:12 AM compared with 3/27/2021 at 7:19 AM.     INDICATION: Tachypnea, pneumonia.     Right IJ central line remains in place. Heart size is stable. Extensive  bilateral airspace disease, right greater than left, in a predominantly central  distribution shows minimal improvement compared to the previous study. Defibrillator pads project over the right upper chest and left lower chest. No  pneumothorax. Definite displaced fractures. Next        Impression Persistent bilateral airspace disease showing slight improvement  compared to earlier study.           Results from Hospital Encounter encounter on 02/26/21   CTA CHEST W OR W WO CONT     Narrative CT dose reduction was achieved through use of a standardized protocol tailored  for this examination and automatic exposure control for dose modulation.      Contrast study maximum intensity projections     There is a extensive diffuse lung disease. Dense consolidation is seen  throughout much of the right lower lobe and there is mild variable groundglass  opacification and small foci of dense consolidation scattered elsewhere  throughout much of each lung, right greater than left, with subpleural sparing,  mostly on the LEFT. Right middle lobe is disproportionately less involved, as is  the anterior left upper lobe.  Central airways are open     Pulmonary arteries are densely opacified and show no filling defect or  distortion. Aorta shows normal dimensions, without dissection. No mediastinal or  hilar adenopathy. Small moderate symmetric pleural effusions. No pericardial  effusion. No significant abdominal finding. Body wall edema        Impression The findings may be a combination of extensive pneumonia,  pneumonitis and edema. No evidence of PE            IMPRESSION:   1. Acute hypoxic respiratory failure  2. History of Goodpasture syndrome  3. Severe cardiomyopathy ejection fraction about 17%  4. Fluid overload pulmonary edema  5. End-stage renal disease on hemodialysis  6. Neutropenia and hyperkalemia  7. Anemia  8. Bradycardia resolved  7. Body mass index is 19.06 kg/m².       RECOMMENDATIONS/PLAN:      1. Extubated on 3/1 patient is on room air now  2. CAT scan of the chest shows pulmonary edema CHF with prior history of Goodpasture syndrome. 3. Agree with dialysis   4. Patient on prednisone  5.  Patient is afebrile normal white count  6. 2D echo shows ejection fraction of 17% off aminophylline and dobutamine

## 2021-03-08 NOTE — PROGRESS NOTES
CM met with patient to discuss HD, she reports that they will be continuing home HD at this time and not be going outpatient, most recent nephrology note reflects this. CM contacted [employer] to notify of patient's d/c, spoke with Keke. Medicare pt has received, reviewed, and signed 2nd IM letter informing them of their right to appeal the discharge. Signed copied has been placed on pt bedside chart. Patient reports she will be transported home by her . [facility], [facility] contact information. Discharge checklist completed and placed on chart.

## 2021-03-08 NOTE — PROGRESS NOTES
OCCUPATIONAL THERAPY TREATMENT  Patient: Demetria Otero (43 y.o. female)  Date: 3/8/2021  Diagnosis: PNA (pneumonia) [J18.9]  SOB (shortness of breath) [R06.02] <principal problem not specified>  Procedure(s) (LRB):  INSERT TEMPORARY PACEMAKER (N/A) 9 Days Post-Op  Precautions:    Chart, occupational therapy assessment, plan of care, and goals were reviewed. ASSESSMENT  Patient continues with skilled OT services and is progressing towards goals. Pt. Received sitting in chair with RN student in pts room. Pt. Agreeable to therapy session. Pt. Performed sit<> stand from chair with SBA, functional ambulation SBA, v/c's provided for pace for increased safety. Pt. Completed facial grooming while sitting in chair ind. Pt.'s HR WFL (74) throughout therapy session. Pt. Stated that christina UE were fairly strong and that she exercises, makes her bed, cooks and cleans independently. Pt. Educated on christina UE therex to perform while at the hospital and to perform once discharged. Therapy session shorted d/t pt receiving breakfast and requesting to eat breakfast before food gets cold. PLAN :  Patient continues to benefit from skilled intervention to address the above impairments. Continue treatment per established plan of care. to address goals. Recommendation for discharge: (in order for the patient to meet his/her long term goals)  HHOT with family care    This discharge recommendation:  Has been made in collaboration with the attending provider and/or case management    IF patient discharges home will need the following DME: none       SUBJECTIVE:   Patient stated I cook, clean and dress my self at home.     OBJECTIVE DATA SUMMARY:   Cognitive/Behavioral Status:  Neurologic State: Alert     Cognition: Follows commands    Functional Mobility and Transfers for ADLs:    Transfers:  Sit to Stand: Stand-by assistance    Balance:  Sitting: Intact; Without support  Sitting - Static: Good (unsupported)  Sitting - Dynamic: Good (unsupported)    ADL Intervention:    Grooming  Grooming Assistance: Independent  Position Performed: Seated in chair  Washing Face: Independent     Container management: independent      Therapeutic Exercises:   Exercise Sets Reps AROM AAROM PROM Self PROM Comments   Shoulder flex/ext 1 3 [x] [] [] []    Elbow flex/ext 1 3 [x] [] [] []    christina shoulder flex/ex 1 3 [x] [] [] []       [] [] [] []      Pain:  0/ 10 pain reported    Activity Tolerance:   Good  Please refer to the flowsheet for vital signs taken during this treatment. After treatment patient left in no apparent distress:   Sitting in chair and Call bell within reach     COMMUNICATION/COLLABORATION:   The patients plan of care was discussed with: student RN in pts room upon exiting.       Debra David  Time Calculation: 16 mins    Problem: Self Care Deficits Care Plan (Adult)  Goal: *Acute Goals and Plan of Care (Insert Text)  Description: Pt will be mod I sup <> sit in prep for EOB ADLs  Pt will be I grooming standing at sink  Pt will be mod I LE dressing sitting EOB/long sit  Pt will be I sit <>  prep for toileting  Pt will be mod I toileting/toilet transfer/cloth mgmt LRAD  Pt will be I following UE HEP in prep for self care tasks     Outcome: Progressing Towards Goal

## 2021-03-08 NOTE — PROGRESS NOTES
Progress Note      3/8/2021 7:50 AM  NAME: Joseph Negrete   MRN:  [id number]   Admit Diagnosis: PNA (pneumonia) [J18.9]  SOB (shortness of breath) [R06.02]      Problem List:   1.  Incomplete database secondary to altered mental status  2.  Patient presents for shortness of breath   3.  Acute hypoxic respiratory failure requiring intubation-now extubated  4.  Pneumonia  5.  Fluid overload, pulmonary edema  6.  Goodpasture's syndrome  7.  End-stage renal disease dialysis dependent  8.  Cardiomyopathy (ejection fraction =17%)  9.  Grade I (mild) diastolic dysfunction, or impaired relaxation  10.  Mild pulmonary hypertension (RVSP =42 mmHg)     11.  Valvular heart disease         11a.  Mild to moderate tricuspid regurgitation         11b.  Mild pulmonic regurgitation         11c.  Mild to moderate mitral regurgitation  12.  Profound bradycardia corrected  13.  Possible seizure disorder  14.  Gastroesophageal reflux disease (GERD)  16.  Thrombocytopenia  17.  Electrolyte abnormalities (hyponatremia, hypochloremia, and hypocalcemia)       Subjective: The patient is seen and examined in room 463. There were no acute cardiovascular events reported overnight. Currently, she denies any cardiovascular complaints. Specifically, she denies chest pain or shortness of breath. The patient is wearing her LifeVest without difficulty. She also has ambulated in the arias again without difficulty. Discussed with RN events overnight.      Medications Personally Reviewed:    Current Facility-Administered Medications   Medication Dose Route Frequency    sevelamer carbonate (RENVELA) tab 1,600 mg  1,600 mg Oral TID WITH MEALS    mirtazapine (REMERON) tablet 7.5 mg  7.5 mg Oral QHS    predniSONE (DELTASONE) tablet 20 mg  20 mg Oral BID WITH MEALS    hydrALAZINE (APRESOLINE) tablet 25 mg  25 mg Oral TID    isosorbide dinitrate (ISORDIL) tablet 20 mg  20 mg Oral TID    heparin (porcine) 1,000 unit/mL injection 3,600 Units  3,600 Units Hemodialysis Q TU, TH & SAT    hydrALAZINE (APRESOLINE) 20 mg/mL injection 10 mg  10 mg IntraVENous Q6H PRN    amLODIPine (NORVASC) tablet 10 mg  10 mg Oral DAILY    carvediloL (COREG) tablet 6.25 mg  6.25 mg Oral BID WITH MEALS    hydrOXYzine pamoate (VISTARIL) capsule 50 mg  50 mg Oral Q6H PRN    NOREPINephrine (LEVOPHED) 16,000 mcg in dextrose 5% 250 mL infusion  2-16 mcg/min IntraVENous TITRATE    insulin lispro (HUMALOG) injection   SubCUTAneous AC&HS    glucose chewable tablet 16 g  4 Tab Oral PRN    dextrose (D50W) injection syrg 12.5-25 g  25-50 mL IntraVENous PRN    glucagon (GLUCAGEN) injection 1 mg  1 mg IntraMUSCular PRN    azaTHIOprine (IMURAN) tablet 25 mg  25 mg Oral DAILY    propofol (DIPRIVAN) 10 mg/mL infusion  0-50 mcg/kg/min IntraVENous TITRATE    metoprolol (LOPRESSOR) injection 5 mg  5 mg IntraVENous Q6H PRN    vitamin B complex capsule 1 Cap  1 Cap Oral DAILY    pantoprazole (PROTONIX) tablet 40 mg  40 mg Oral DAILY    acetaminophen (TYLENOL) tablet 650 mg  650 mg Oral Q6H PRN    Or    acetaminophen (TYLENOL) suppository 650 mg  650 mg Rectal Q6H PRN    polyethylene glycol (MIRALAX) packet 17 g  17 g Oral DAILY PRN    ondansetron (ZOFRAN ODT) tablet 4 mg  4 mg Oral Q8H PRN    Or    ondansetron (ZOFRAN) injection 4 mg  4 mg IntraVENous Q6H PRN    [Held by provider] piperacillin-tazobactam (ZOSYN) 3.375 g in 0.9% sodium chloride (MBP/ADV) 100 mL MBP  3.375 g IntraVENous Q12H           Objective:      Physical Exam:  Last 24hrs VS reviewed since prior progress note. Most recent are:    Visit Vitals  BP (!) 173/89   Pulse 67   Temp 98 °F (36.7 °C)   Resp 20   Ht 5' 2.99\" (1.6 m)   Wt 48.8 kg (107 lb 9.4 oz)   SpO2 99%   BMI 19.06 kg/m²     No intake or output data in the 24 hours ending 03/08/21 0750     General Appearance: Well developed, no acute respiratory distress. Chest: Lungs clear to auscultation bilaterally. Cardiovascular:  The patient is wearing external defibrillator, JVP is not elevated, PMI is not attempted, normal intensity S1 and S2, without S3, 2/6 holosystolic murmur heard best along left sternal border  Abdomen: Soft, non-tender, bowel sounds are active. Extremities: No edema bilaterally. Data Review    Telemetry: Sinus rhythm without ventricular ectopy    EKG:   [x]  No new EKG for review    Lab Data Personally Reviewed:    Recent Labs     03/06/21  0714 03/05/21  1413   WBC 5.8 6.6   HGB 10.5* 11.7   HCT 32.6* 36.6    213     No results for input(s): INR, PTP, APTT, INREXT in the last 72 hours. Recent Labs     03/06/21  0714 03/05/21  1413   * 129*   K 4.6 4.3   CL 93* 92*   CO2 23 24   BUN 78* 60*   CREA 7.76* 6.76*   * 147*   CA 8.4* 8.6     No results for input(s): CPK, CKNDX, TROIQ in the last 72 hours. No lab exists for component: CPKMB  No results found for: CHOL, CHOLX, CHLST, CHOLV, HDL, HDLP, LDL, LDLC, DLDLP, TGLX, TRIGL, TRIGP, CHHD, CHHDX    Recent Labs     03/06/21  0714 03/05/21  1413   * 92   TP 5.8* 6.4   ALB 3.1* 3.3*   GLOB 2.7 3.1     No results for input(s): PH, PCO2, PO2 in the last 72 hours. Assessment/Plan:   1. From a cardiovascular standpoint the patient may be discharged home   2. Continue to monitor serum electrolytes and renal function  3. Continue current cardiovascular medications including amlodipine, carvedilol, heparin, hydralazine, insulin, and isosorbide  4.   The patient is to follow-up with Dr. Jimmy Linton within 1 to 2 weeks for further cardiovascular management after discharge       Logan Moss MD

## 2021-03-08 NOTE — DISCHARGE SUMMARY
Patient Name:   Cheri Degroot       YOB: 1950       Age:  72 y.o. Admit Date: 2/26/2021      Subjective:     Patient seen in her room, sitting in chair eating breakfast. She states that she is feeling well. Doing well after extubation on 3/1, talking, eating, alert, and awake. She was last dialyzed on Saturday. She denies chest pain, shortness of breath, and weakness. /89  P 67    Hep B and C panels negative     Echo on 2/28 - LVEF is 17%. Normal cavity size and wall thickness. Severely reduced systolic function. Mild (grade 1) left ventricular diastolic dysfunction. Objective:     Visit Vitals  BP (!) 146/89 (BP Patient Position: Sitting)   Pulse 69   Temp 98 °F (36.7 °C)   Resp 20   Ht 5' 2.99\" (1.6 m)   Wt 48.8 kg (107 lb 9.4 oz)   SpO2 99%   BMI 19.06 kg/m²        Recent Results (from the past 24 hour(s))   GLUCOSE, POC    Collection Time: 03/07/21 12:30 PM   Result Value Ref Range    Glucose (POC) 98 65 - 100 mg/dL    Performed by [address], POC    Collection Time: 03/07/21  3:55 PM   Result Value Ref Range    Glucose (POC) 104 (H) 65 - 100 mg/dL    Performed by [address], POC    Collection Time: 03/07/21 10:17 PM   Result Value Ref Range    Glucose (POC) 195 (H) 65 - 100 mg/dL    Performed by Ariane Holland    GLUCOSE, POC    Collection Time: 03/08/21  7:51 AM   Result Value Ref Range    Glucose (POC) 94 65 - 100 mg/dL    Performed by Ashley Mcgill    GLUCOSE, POC    Collection Time: 03/08/21 11:50 AM   Result Value Ref Range    Glucose (POC) 108 (H) 65 - 100 mg/dL    Performed by Ashley Mcgill      [unfilled]      Review of Systems  Patient is alert awake denies any chest pain or shortness of breath nausea no vomiting      Physical Exam:      Constitutional:  alert awake answer all questions   HENT:   Head: Normocephalic and atraumatic. Eyes: Pupils are equal, round, and reactive to light.  EOM are normal.   Cardiovascular: Normal rate, regular rhythm and normal heart sounds. Pulmonary/Chest: Clear to auscultation   abdominal: Soft. Bowel sounds are normal. There is no abdominal tenderness. There is no rebound and no guarding. Musculoskeletal: Normal range of motion. Neurological: alert and oriented    XR CHEST PORT   Final Result      XR CHEST PORT   Final Result   Stable appearance of endotracheal tube, right central dialysis   catheter, gastric tube, and temporary right ventricular pacing lead. Bilateral   pulmonary edema and small pleural effusions, consistent with clinical CHF,   subjectively slightly improved. No pneumothorax or other acute change. XR CHEST PORT   Final Result   Bilateral airspace disease/edema showing slight improvement compared   to the previous study. XR CHEST PORT   Final Result   Persistent bilateral airspace disease showing slight improvement   compared to earlier study. XR CHEST PORT   Final Result   Bilateral central airspace disease/edema right greater than left   showing slight worsening on the right. CTA CHEST W OR W WO CONT   Final Result   The findings may be a combination of extensive pneumonia,   pneumonitis and edema.  No evidence of PE      XR CHEST SNGL V   Final Result           Recent Results (from the past 24 hour(s))   GLUCOSE, POC    Collection Time: 03/07/21 12:30 PM   Result Value Ref Range    Glucose (POC) 98 65 - 100 mg/dL    Performed by [address], POC    Collection Time: 03/07/21  3:55 PM   Result Value Ref Range    Glucose (POC) 104 (H) 65 - 100 mg/dL    Performed by [address], POC    Collection Time: 03/07/21 10:17 PM   Result Value Ref Range    Glucose (POC) 195 (H) 65 - 100 mg/dL    Performed by Caterina France    GLUCOSE, POC    Collection Time: 03/08/21  7:51 AM   Result Value Ref Range    Glucose (POC) 94 65 - 100 mg/dL    Performed by [address] [place] [id number], POC    Collection Time: 03/08/21 11:50 AM   Result Value Ref Range    Glucose (POC) 108 (H) 65 - 100 mg/dL    Performed by Ryan Foy        Results     Procedure Component Value Units Date/Time    CULTURE, RESPIRATORY/SPUTUM/BRONCH Blondell Brooklynn [[id number]] Collected: 02/28/21 0930    Order Status: Completed Specimen: Sputum Updated: 03/02/21 1233     Special Requests: No Special Requests        GRAM STAIN Occasional WBCs seen               Rare Epithelial cells seen            No organisms seen        Culture result: No growth 2 days       CULTURE, BLOOD #1 [[id number]] Collected: 02/27/21 0158    Order Status: Completed Specimen: Blood Updated: 03/06/21 1535     Special Requests: No Special Requests        Culture result: No growth 6 days       CULTURE, BLOOD #2 [[id number]] Collected: 02/27/21 0145    Order Status: Completed Specimen: Blood Updated: 03/06/21 1535     Special Requests: No Special Requests        Culture result: No growth 6 days       CULTURE, BLOOD, PAIRED [[id number]] Collected: 02/26/21 1430    Order Status: Completed Specimen: Blood Updated: 03/05/21 0719     Special Requests: No Special Requests        Culture result: No growth 6 days       CULTURE, BLOOD [[id number]] Collected: 02/26/21 1430    Order Status: Canceled Specimen: Blood     COVID-19 RAPID TEST [[id number]] Collected: 02/26/21 1336    Order Status: Completed Specimen: Nasopharyngeal Updated: 02/26/21 1409     Specimen source Nasopharyngeal        COVID-19 rapid test Not Detected        Comment: Rapid Abbott ID Now   Rapid NAAT:  The specimen is NEGATIVE for SARS-CoV-2, the novel coronavirus associated with COVID-19. Negative results should be treated as presumptive and, if inconsistent with clinical signs and symptoms or necessary for patient management, should be tested with an alternative molecular assay. Negative results do not preclude SARS-CoV-2 infection and should not be used as the sole basis for patient management decisions.    This test has been authorized by the FDA under   an Emergency Use Authorization (EUA) for use by authorized laboratories. Fact sheet for Healthcare Providers: ConventionUpdate.co.nz Fact sheet for Patients: ConventionUpdate.co.nz   Methodology: Isothermal Nucleic Acid Amplification                Labs:     Recent Labs     03/06/21  0714 03/05/21  1413   WBC 5.8 6.6   HGB 10.5* 11.7   HCT 32.6* 36.6    213     Recent Labs     03/06/21 0714 03/05/21  1413   * 129*   K 4.6 4.3   CL 93* 92*   CO2 23 24   BUN 78* 60*   CREA 7.76* 6.76*   * 147*   CA 8.4* 8.6     Recent Labs     03/06/21 0714 03/05/21  1413   ALT 46 58   * 92   TBILI 0.3 0.4   TP 5.8* 6.4   ALB 3.1* 3.3*   GLOB 2.7 3.1     No results for input(s): INR, PTP, APTT, INREXT, INREXT in the last 72 hours. No results for input(s): FE, TIBC, PSAT, FERR in the last 72 hours. No results found for: FOL, RBCF   No results for input(s): PH, PCO2, PO2 in the last 72 hours. No results for input(s): CPK, CKNDX, TROIQ in the last 72 hours.     No lab exists for component: CPKMB  No results found for: CHOL, CHOLX, CHLST, CHOLV, HDL, HDLP, LDL, LDLC, DLDLP, TGLX, TRIGL, TRIGP, CHHD, CHHDX  Lab Results   Component Value Date/Time    Glucose (POC) 108 (H) 03/08/2021 11:50 AM    Glucose (POC) 94 03/08/2021 07:51 AM    Glucose (POC) 195 (H) 03/07/2021 10:17 PM    Glucose (POC) 104 (H) 03/07/2021 03:55 PM    Glucose (POC) 98 03/07/2021 12:30 PM     No results found for: COLOR, APPRN, SPGRU, REFSG, YESY, PROTU, GLUCU, KETU, BILU, UROU, CARTER, LEUKU, GLUKE, EPSU, BACTU, WBCU, RBCU, CASTS, UCRY      Assessment:     Acute hypoxemic respiratory failure extubated on 3/1  severe symptomatic bradycardia on Toprol urinary sphincter  End-stage renal disease on hemodialysis at home  End-stage cardiomyopathy with ejection fraction of 17%  Hypertensive  Goodpasture's syndrome  Fluid overload  Hyperkalemia  Anemia of chronic disease      Plan:   D/C Dobutamine drip    Patient on Norvasc 10 mg daily  On Imuran 25 mg daily  Coreg 6.25 twice daily  Hydralazine 25 mg 3 times a day  On isosorbide 20 mg 3 times a day  Remeron 7.5 mg nightly   On prednisone 20 mg BID with meals  Protonix 40 mg daily      Follow-up with nephrology cardiology PT/OT and pulmonologist    Cardiology arranging LifeVest placement prior to discharge and follow up in 1-2 weeks       Patient going home with home hemodialysis and home health    D/c Today if cleared by the nephrology    Current Facility-Administered Medications:     sevelamer carbonate (RENVELA) tab 1,600 mg, 1,600 mg, Oral, TID WITH MEALS, Stanley MORALES MD, 1,600 mg at 03/08/21 0835    mirtazapine (REMERON) tablet 7.5 mg, 7.5 mg, Oral, QHS, [facility] MD, 7.5 mg at 03/07/21 2239    predniSONE (DELTASONE) tablet 20 mg, 20 mg, Oral, BID WITH MEALS, Nasir Rajput MD, 20 mg at 03/08/21 0835    hydrALAZINE (APRESOLINE) tablet 25 mg, 25 mg, Oral, TID, Julisa Lerner MD, 25 mg at 03/08/21 0835    isosorbide dinitrate (ISORDIL) tablet 20 mg, 20 mg, Oral, TID, Julisa Lerner MD, 20 mg at 03/08/21 0835    heparin (porcine) 1,000 unit/mL injection 3,600 Units, 3,600 Units, Hemodialysis, Q TU, TH & SAT, Nisa Poe MD, 3,600 Units at 03/04/21 0827    hydrALAZINE (APRESOLINE) 20 mg/mL injection 10 mg, 10 mg, IntraVENous, Q6H PRN, Julisa Lerner MD    amLODIPine (NORVASC) tablet 10 mg, 10 mg, Oral, DAILY, Fide SIMS MD, 10 mg at 03/08/21 0835    carvediloL (COREG) tablet 6.25 mg, 6.25 mg, Oral, BID WITH MEALS, Julisa Lerner MD, 6.25 mg at 03/08/21 0835    hydrOXYzine pamoate (VISTARIL) capsule 50 mg, 50 mg, Oral, Q6H PRN, Kim George MD, 50 mg at 02/27/21 0528    NOREPINephrine (LEVOPHED) 16,000 mcg in dextrose 5% 250 mL infusion, 2-16 mcg/min, IntraVENous, TITRATE, Kim George MD    insulin lispro (HUMALOG) injection, , SubCUTAneous, AC&HS, Anant George MD, Stopped at 03/07/21 1130    glucose chewable tablet 16 g, 4 Tab, Oral, PRN, Jorge L George MD    dextrose (D50W) injection syrg 12.5-25 g, 25-50 mL, IntraVENous, PRN, Jorge L George MD    glucagon (GLUCAGEN) injection 1 mg, 1 mg, IntraMUSCular, PRN, Jorge L George MD    azaTHIOprine (IMURAN) tablet 25 mg, 25 mg, Oral, DAILY, Marvel Acuña MD, 25 mg at 03/08/21 0835    propofol (DIPRIVAN) 10 mg/mL infusion, 0-50 mcg/kg/min, IntraVENous, TITRATE, Jorge L George MD, Stopped at 03/01/21 0815    metoprolol (LOPRESSOR) injection 5 mg, 5 mg, IntraVENous, Q6H PRN, Ed SIMS MD, 5 mg at 02/27/21 1646    vitamin B complex capsule 1 Cap, 1 Cap, Oral, DAILY, Kim George MD, 1 Cap at 03/08/21 0835    pantoprazole (PROTONIX) tablet 40 mg, 40 mg, Oral, DAILY, Kim George MD, 40 mg at 03/08/21 4414    acetaminophen (TYLENOL) tablet 650 mg, 650 mg, Oral, Q6H PRN **OR** acetaminophen (TYLENOL) suppository 650 mg, 650 mg, Rectal, Q6H PRN, Jorge L George MD    polyethylene glycol (MIRALAX) packet 17 g, 17 g, Oral, DAILY PRN, Jorge L George MD    ondansetron (ZOFRAN ODT) tablet 4 mg, 4 mg, Oral, Q8H PRN **OR** ondansetron (ZOFRAN) injection 4 mg, 4 mg, IntraVENous, Q6H PRN, Kim George MD    [Held by provider] piperacillin-tazobactam (ZOSYN) 3.375 g in 0.9% sodium chloride (MBP/ADV) 100 mL MBP, 3.375 g, IntraVENous, Q12H, Kim George MD, Stopped at 03/04/21 0900

## 2021-03-08 NOTE — PROGRESS NOTES
Bedside and Verbal shift change report given to Lee Jana Neff (oncoming nurse) by Sarabjit Adler RN (offgoing nurse). Report included the following information SBAR, Intake/Output, Recent Results and Cardiac Rhythm SR. Patient discharged home with home health. Patient will continue home dialysis per Case Management. Telemetry discontinued. Permacath left in place on discharge. Patient taken down to front entrance via wheelchair to  waiting in private vehicle. Discharge instrutions, medications, and follow up appointments discussed with patient and  at bedside. Patient verbalized understanding and willingness to comply. Prescriptions sent to pharmacy. Discharge plan of care/case management plan validated with provider discharge order.

## 2021-03-09 NOTE — ROUTINE PROCESS
of pt called and said wife does not have RX for Carvedilol 3.125mg Brenda George of the above Dr Figueroa Tunbridge also ntfd pt uses [facility] [employer]

## 2021-03-11 LAB
IRON SATN MFR SERPL: 21 % (ref 20–50)
IRON SERPL-MCNC: 42 UG/DL (ref 35–150)
TIBC SERPL-MCNC: 199 UG/DL (ref 250–450)

## 2022-03-19 PROBLEM — J18.9 PNA (PNEUMONIA): Status: ACTIVE | Noted: 2021-02-26

## 2022-03-19 PROBLEM — R06.02 SOB (SHORTNESS OF BREATH): Status: ACTIVE | Noted: 2021-02-26

## 2023-05-12 RX ORDER — AMLODIPINE BESYLATE 10 MG/1
TABLET ORAL DAILY
COMMUNITY
Start: 2021-03-09

## 2023-05-12 RX ORDER — PREDNISONE 20 MG/1
TABLET ORAL 2 TIMES DAILY WITH MEALS
COMMUNITY
Start: 2021-03-08

## 2023-05-12 RX ORDER — SEVELAMER CARBONATE 800 MG/1
1 TABLET, FILM COATED ORAL
COMMUNITY
Start: 2020-10-27

## 2023-05-12 RX ORDER — AZATHIOPRINE 50 MG/1
2 TABLET ORAL DAILY
COMMUNITY
Start: 2020-05-13

## 2023-05-12 RX ORDER — CARVEDILOL 3.12 MG/1
1 TABLET ORAL 2 TIMES DAILY
COMMUNITY
Start: 2020-04-06

## 2023-05-12 RX ORDER — HYDRALAZINE HYDROCHLORIDE 25 MG/1
TABLET, FILM COATED ORAL 3 TIMES DAILY
COMMUNITY
Start: 2021-03-08

## 2023-05-12 RX ORDER — ISOSORBIDE DINITRATE 20 MG/1
TABLET ORAL 3 TIMES DAILY
COMMUNITY
Start: 2021-03-08

## 2023-05-12 RX ORDER — PANTOPRAZOLE SODIUM 40 MG/1
1 TABLET, DELAYED RELEASE ORAL DAILY
COMMUNITY

## 2023-05-12 RX ORDER — MIRTAZAPINE 7.5 MG/1
TABLET, FILM COATED ORAL
COMMUNITY
Start: 2021-03-08

## (undated) DEVICE — 3M™ TEGADERM™ TRANSPARENT FILM DRESSING FRAME STYLE, 1627, 4 IN X 10 IN (10 CM X 25 CM), 20/CT 4CT/CASE: Brand: 3M™ TEGADERM™

## (undated) DEVICE — COTTON STRIP: Brand: DEROYAL

## (undated) DEVICE — 3M™ TEGADERM™ TRANSPARENT FILM DRESSING FRAME STYLE, 1626W, 4 IN X 4-3/4 IN (10 CM X 12 CM), 50/CT 4CT/CASE: Brand: 3M™ TEGADERM™

## (undated) DEVICE — SHTH INTRO SET 6FR 11CM GRN -- W/GDWIRE SUPER ARROW FLEX

## (undated) DEVICE — SUT SLK 0 30IN CT1 BLK --